# Patient Record
Sex: MALE | Race: WHITE | NOT HISPANIC OR LATINO | Employment: FULL TIME | URBAN - METROPOLITAN AREA
[De-identification: names, ages, dates, MRNs, and addresses within clinical notes are randomized per-mention and may not be internally consistent; named-entity substitution may affect disease eponyms.]

---

## 2017-04-26 ENCOUNTER — TELEPHONE (OUTPATIENT)
Dept: FAMILY MEDICINE | Facility: OTHER | Age: 52
End: 2017-04-26

## 2017-04-26 NOTE — TELEPHONE ENCOUNTER
Hard Copy Prescription for ADDERALL XR 15 MG printed on 07/13/2016 NEVER PICKED UP, Hard Copy shredded    Closing encounter  Radha Morales RT (R)

## 2017-06-14 ENCOUNTER — OFFICE VISIT (OUTPATIENT)
Dept: FAMILY MEDICINE | Facility: OTHER | Age: 52
End: 2017-06-14
Payer: COMMERCIAL

## 2017-06-14 VITALS
DIASTOLIC BLOOD PRESSURE: 82 MMHG | OXYGEN SATURATION: 96 % | HEIGHT: 72 IN | HEART RATE: 64 BPM | WEIGHT: 198.8 LBS | TEMPERATURE: 98.2 F | RESPIRATION RATE: 14 BRPM | SYSTOLIC BLOOD PRESSURE: 122 MMHG | BODY MASS INDEX: 26.93 KG/M2

## 2017-06-14 DIAGNOSIS — K12.0 CANKER SORES ORAL: ICD-10-CM

## 2017-06-14 DIAGNOSIS — R07.0 THROAT PAIN: Primary | ICD-10-CM

## 2017-06-14 PROCEDURE — 99214 OFFICE O/P EST MOD 30 MIN: CPT | Performed by: FAMILY MEDICINE

## 2017-06-14 ASSESSMENT — PAIN SCALES - GENERAL: PAINLEVEL: SEVERE PAIN (7)

## 2017-06-14 NOTE — NURSING NOTE
"Chief Complaint   Patient presents with     Pharyngitis     Panel Management       Initial /82  Pulse 64  Temp 98.2  F (36.8  C) (Temporal)  Resp 14  Ht 5' 11.5\" (1.816 m)  Wt 198 lb 12.8 oz (90.2 kg)  SpO2 96%  BMI 27.34 kg/m2 Estimated body mass index is 27.34 kg/(m^2) as calculated from the following:    Height as of this encounter: 5' 11.5\" (1.816 m).    Weight as of this encounter: 198 lb 12.8 oz (90.2 kg).  Medication Reconciliation: complete     Liz Mathews MA    "

## 2017-06-14 NOTE — PROGRESS NOTES
SUBJECTIVE:                                                    Rebel Garcia is a 51 year old male who presents to clinic today for the following health issues:      Acute Illness   Acute illness concerns: sore throat  Onset: 3  weeks ago     Fever: no     Chills/Sweats: no     Headache (location?): no     Sinus Pressure:no    Conjunctivitis:  no    Ear Pain: no    Rhinorrhea: no     Congestion: no     Sore Throat: YES     Cough: no    Wheeze: no     Decreased Appetite: no     Nausea: no     Vomiting: no     Diarrhea:  no     Dysuria/Freq.: no     Fatigue/Achiness: no     Sick/Strep Exposure: no      Therapies Tried and outcome: Patient states he takes Tylenol and Ibuprofen it does seem to help with pain and discomfort.   He was previously seen at an Urgent care , had a negative strept test and culture. No fevers , patient does not smoke , does not chew tobacco           Problem list and histories reviewed & adjusted, as indicated.  Additional history: as documented    Patient Active Problem List   Diagnosis     Pain in joint, lower leg     Encounter for other general counseling or advice on contraception     Encounter for sterilization     ESOPHAGEAL REFLUX     Anxiety     CARDIOVASCULAR SCREENING; LDL GOAL LESS THAN 160     Family history of colon cancer     Family history of prostate cancer     Attention deficit hyperactivity disorder (ADHD)     Past Surgical History:   Procedure Laterality Date     COLONOSCOPY  2/25/2000     COLONOSCOPY  02/16/10     COLONOSCOPY N/A 7/17/2015    Procedure: COMBINED COLONOSCOPY, SINGLE OR MULTIPLE BIOPSY/POLYPECTOMY BY BIOPSY;  Surgeon: Dima Sosa MD;  Location:  GI     HC CORRECT KIM BORREGO/DARNELL/JILLIAN  05/04/2005    Modified Darnell bunionectomy, right foot.     HC REPAIR UMBILICAL HARDY,5+Y/O, INCARCERATED OR STRANGULATED  6/15/2004     HERNIA REPAIR, INCISIONAL  08/25/2006    Incarcerated recurrent ventral hernia.     HERNIORRHAPHY UMBILICAL  3/29/2011  "   HERNIORRHAPHY UMBILICAL performed by ALOK WESLEY at  OR       Social History   Substance Use Topics     Smoking status: Former Smoker     Packs/day: 0.75     Years: 18.00     Types: Cigarettes     Quit date: 2006     Smokeless tobacco: Never Used     Alcohol use Yes      Comment: occ     Family History   Problem Relation Age of Onset     Allergies Father      iodine     HEART DISEASE Father      heart attack at 59 - CABG at 60     CEREBROVASCULAR DISEASE Father      CANCER Mother      colon -  at age 56     CANCER Sister      cervix -  at age 38     Neurologic Disorder Sister      CNS bleed x 2 sisters     Prostate Cancer Brother 58     Other Cancer Brother 62     stage 4 liver cancer     Colon Cancer Sister 68         Current Outpatient Prescriptions   Medication Sig Dispense Refill     MAGIC MOUTHWASH, ENTER INGREDIENTS IN COMMENTS, Swish and spit 5-10 mLs in mouth every 6 hours as needed 180 mL 1     amphetamine-dextroamphetamine (ADDERALL XR) 15 MG per capsule Take 1 capsule (15 mg) by mouth daily (Patient not taking: Reported on 2017) 30 capsule 0     Allergies   Allergen Reactions     No Known Allergies      BP Readings from Last 3 Encounters:   17 122/82   16 122/70   12/16/15 124/76    Wt Readings from Last 3 Encounters:   17 198 lb 12.8 oz (90.2 kg)   16 195 lb (88.5 kg)   12/16/15 196 lb (88.9 kg)                  Labs reviewed in EPIC    Reviewed and updated as needed this visit by clinical staff       Reviewed and updated as needed this visit by Provider         ROS:  C: NEGATIVE for fever, chills, change in weight  ENT/MOUTH: POSITIVE for sore throat on the right side of the throat   R: NEGATIVE for significant cough or SOB  CV: NEGATIVE for chest pain, palpitations or peripheral edema    OBJECTIVE:                                                    /82  Pulse 64  Temp 98.2  F (36.8  C) (Temporal)  Resp 14  Ht 5' 11.5\" (1.816 m)  Wt " 198 lb 12.8 oz (90.2 kg)  SpO2 96%  BMI 27.34 kg/m2  Body mass index is 27.34 kg/(m^2).   GENERAL:  alert, well nourished, well hydrated, no distress  HENT: ear canals- normal; TMs- normal; Nose- normal; Mouth- ulcer noted on the tonsil on the right, with yellow base  with mild erythema, no exudate.   NECK: no tenderness, no adenopathy, no asymmetry, no masses, no stiffness; thyroid- normal to palpation      Diagnostic test results:  Diagnostic Test Results:  none      ASSESSMENT/PLAN:                                                    1. Throat pain  Discussed with patient  Canker sore noted , unclear cause , will treat with Magic mouth  wash , but if not  Improved , I will like him to see ENT , will need biopsy of lesion . Referral placed , patient will call to schedule  - OTOLARYNGOLOGY REFERRAL  - MAGIC MOUTHWASH, ENTER INGREDIENTS IN COMMENTS,; Swish and spit 5-10 mLs in mouth every 6 hours as needed  Dispense: 180 mL; Refill: 1    2. Canker sores oral  As above   - OTOLARYNGOLOGY REFERRAL  - MAGIC MOUTHWASH, ENTER INGREDIENTS IN COMMENTS,; Swish and spit 5-10 mLs in mouth every 6 hours as needed  Dispense: 180 mL; Refill: 1      Follow up with Provider -      Nena Johnson MD, MD  [facility]

## 2017-06-14 NOTE — MR AVS SNAPSHOT
After Visit Summary   6/14/2017    Rebel Garcia    MRN: [id number]           Patient Information     Date Of Birth          1965        Visit Information        Provider Department      6/14/2017 3:20 PM Nena Johnson MD [facility]        Today's Diagnoses     Throat pain    -  1    Canker sores oral           Follow-ups after your visit        Additional Services     OTOLARYNGOLOGY REFERRAL       Your provider has referred you to: [facility]: [facility] [phone]   [email or web address]    Please be aware that coverage of these services is subject to the terms and limitations of your health insurance plan.  Call member services at your health plan with any benefit or coverage questions.      Please bring the following with you to your appointment:    (1) Any X-Rays, CTs or MRIs which have been performed.  Contact the facility where they were done to arrange for  prior to your scheduled appointment.   (2) List of current medications  (3) This referral request   (4) Any documents/labs given to you for this referral                  Who to contact     If you have questions or need follow up information about today's clinic visit or your schedule please contact [facility] directly at [phone].  Normal or non-critical lab and imaging results will be communicated to you by MyChart, letter or phone within 4 business days after the clinic has received the results. If you do not hear from us within 7 days, please contact the clinic through MyChart or phone. If you have a critical or abnormal lab result, we will notify you by phone as soon as possible.  Submit refill requests through [facility] [employer] or call your pharmacy and they will forward the refill request to us. Please allow 3 business days for your refill to be completed.          Additional Information About Your Visit        MyChart Information      "[employer] lets you send messages to your doctor, view your test results, renew your prescriptions, schedule appointments and more. To sign up, go to [email or web address] . Click on \"Log in\" on the left side of the screen, which will take you to the Welcome page. Then click on \"Sign up Now\" on the right side of the page.     You will be asked to enter the access code listed below, as well as some personal information. Please follow the directions to create your username and password.     Your access code is: [email or web address]  Expires: 2017  3:45 PM     Your access code will  in 90 days. If you need help or a new code, please call your [place] clinic or [phone].        Care EveryWhere ID     This is your Care EveryWhere ID. This could be used by other organizations to access your [place] medical records  [id number]        Your Vitals Were     Pulse Temperature Respirations Height Pulse Oximetry BMI (Body Mass Index)    64 98.2  F (36.8  C) (Temporal) 14 5' 11.5\" (1.816 m) 96% 27.34 kg/m2       Blood Pressure from Last 3 Encounters:   17 122/82   16 122/70   12/16/15 124/76    Weight from Last 3 Encounters:   17 198 lb 12.8 oz (90.2 kg)   16 195 lb (88.5 kg)   12/16/15 196 lb (88.9 kg)              We Performed the Following     OTOLARYNGOLOGY REFERRAL          Today's Medication Changes          These changes are accurate as of: 17  3:45 PM.  If you have any questions, ask your nurse or doctor.               Start taking these medicines.        Dose/Directions    MAGIC MOUTHWASH (ENTER INGREDIENTS IN COMMENTS)   Used for:  Canker sores oral, Throat pain   Started by:  Nena Johnson MD        Dose:  5-10 mL   Swish and spit 5-10 mLs in mouth every 6 hours as needed   Quantity:  180 mL   Refills:  1            Where to get your medicines      Some of these will need a paper prescription and others can be bought over the counter.  Ask your nurse if you have " questions.     Bring a paper prescription for each of these medications     MAGIC MOUTHWASH (ENTER INGREDIENTS IN COMMENTS)                Primary Care Provider Office Phone # Fax #    Marilu Gomez PA-C [phone]       [facility] [address] [place] [address]        Thank you!     Thank you for choosing [facility]  for your care. Our goal is always to provide you with excellent care. Hearing back from our patients is one way we can continue to improve our services. Please take a few minutes to complete the written survey that you may receive in the mail after your visit with us. Thank you!             Your Updated Medication List - Protect others around you: Learn how to safely use, store and throw away your medicines at www.disposemymeds.org.          This list is accurate as of: 6/14/17  3:45 PM.  Always use your most recent med list.                   Brand Name Dispense Instructions for use    amphetamine-dextroamphetamine 15 MG per 24 hr capsule    ADDERALL XR    30 capsule    Take 1 capsule (15 mg) by mouth daily       MAGIC MOUTHWASH (ENTER INGREDIENTS IN COMMENTS)     180 mL    Swish and spit 5-10 mLs in mouth every 6 hours as needed

## 2017-11-13 ENCOUNTER — TELEPHONE (OUTPATIENT)
Dept: FAMILY MEDICINE | Facility: OTHER | Age: 52
End: 2017-11-13

## 2017-11-13 ENCOUNTER — OFFICE VISIT (OUTPATIENT)
Dept: FAMILY MEDICINE | Facility: OTHER | Age: 52
End: 2017-11-13
Payer: COMMERCIAL

## 2017-11-13 ENCOUNTER — RADIANT APPOINTMENT (OUTPATIENT)
Dept: GENERAL RADIOLOGY | Facility: OTHER | Age: 52
End: 2017-11-13
Attending: PHYSICIAN ASSISTANT
Payer: COMMERCIAL

## 2017-11-13 VITALS
RESPIRATION RATE: 16 BRPM | WEIGHT: 199 LBS | SYSTOLIC BLOOD PRESSURE: 118 MMHG | BODY MASS INDEX: 27.37 KG/M2 | OXYGEN SATURATION: 97 % | TEMPERATURE: 97.6 F | HEART RATE: 78 BPM | DIASTOLIC BLOOD PRESSURE: 76 MMHG

## 2017-11-13 DIAGNOSIS — F90.9 ATTENTION DEFICIT HYPERACTIVITY DISORDER (ADHD), UNSPECIFIED ADHD TYPE: ICD-10-CM

## 2017-11-13 DIAGNOSIS — R04.2 HEMOPTYSIS: Primary | ICD-10-CM

## 2017-11-13 DIAGNOSIS — Z87.891 FORMER SMOKER: ICD-10-CM

## 2017-11-13 DIAGNOSIS — R07.0 THROAT PAIN: ICD-10-CM

## 2017-11-13 DIAGNOSIS — R04.2 HEMOPTYSIS: ICD-10-CM

## 2017-11-13 LAB
ERYTHROCYTE [DISTWIDTH] IN BLOOD BY AUTOMATED COUNT: 13.1 % (ref 10–15)
HCT VFR BLD AUTO: 45.7 % (ref 40–53)
HGB BLD-MCNC: 15.9 G/DL (ref 13.3–17.7)
MCH RBC QN AUTO: 30.3 PG (ref 26.5–33)
MCHC RBC AUTO-ENTMCNC: 34.8 G/DL (ref 31.5–36.5)
MCV RBC AUTO: 87 FL (ref 78–100)
PLATELET # BLD AUTO: 221 10E9/L (ref 150–450)
RBC # BLD AUTO: 5.25 10E12/L (ref 4.4–5.9)
WBC # BLD AUTO: 8.3 10E9/L (ref 4–11)

## 2017-11-13 PROCEDURE — 36415 COLL VENOUS BLD VENIPUNCTURE: CPT | Performed by: PHYSICIAN ASSISTANT

## 2017-11-13 PROCEDURE — 85027 COMPLETE CBC AUTOMATED: CPT | Performed by: PHYSICIAN ASSISTANT

## 2017-11-13 PROCEDURE — 86480 TB TEST CELL IMMUN MEASURE: CPT | Performed by: PHYSICIAN ASSISTANT

## 2017-11-13 PROCEDURE — 99214 OFFICE O/P EST MOD 30 MIN: CPT | Performed by: PHYSICIAN ASSISTANT

## 2017-11-13 PROCEDURE — 71020 XR CHEST 2 VW: CPT

## 2017-11-13 RX ORDER — DEXTROAMPHETAMINE SACCHARATE, AMPHETAMINE ASPARTATE MONOHYDRATE, DEXTROAMPHETAMINE SULFATE AND AMPHETAMINE SULFATE 3.75; 3.75; 3.75; 3.75 MG/1; MG/1; MG/1; MG/1
15 CAPSULE, EXTENDED RELEASE ORAL DAILY
Qty: 30 CAPSULE | Refills: 0 | Status: SHIPPED | OUTPATIENT
Start: 2017-11-17 | End: 2017-11-13

## 2017-11-13 RX ORDER — DEXTROAMPHETAMINE SACCHARATE, AMPHETAMINE ASPARTATE MONOHYDRATE, DEXTROAMPHETAMINE SULFATE AND AMPHETAMINE SULFATE 3.75; 3.75; 3.75; 3.75 MG/1; MG/1; MG/1; MG/1
15 CAPSULE, EXTENDED RELEASE ORAL DAILY
Qty: 30 CAPSULE | Refills: 0 | Status: SHIPPED | OUTPATIENT
Start: 2017-12-17 | End: 2018-07-30

## 2017-11-13 ASSESSMENT — PAIN SCALES - GENERAL: PAINLEVEL: MILD PAIN (3)

## 2017-11-13 NOTE — PROGRESS NOTES
"  SUBJECTIVE:   Rebel Garcia is a 52 year old male who presents to clinic today for the following health issues:       Pt is masked  Acute Illness--   Acute illness concerns: uri  Onset: a few days ago, started with some discomfort at the base of his throat, he feels the need to clear his throat often.      Fever: no    Chills/Sweats: no, specifically no night sweats     Headache (location?): no    Sinus Pressure:no    Conjunctivitis:  no    Ear Pain: no    Rhinorrhea: no    Congestion: no    Sore Throat: no but muscles are sore there at base of his throat as above this occurred first     Cough: YES-productive of blood colored sputum yesterday coughed up some flecks of blood in mucus, then next time he coughed up about 1/2 cup of bright red blood, last evening he coughed up 1/4 cup of bright red blood, and this morning a mix of brown \"old \"blood and bright red blood\" it was less today than it had been.  He feels well. Denies travel outside of country or exposure to people with TB . He denies recent epistaxis     Wheeze: no    Decreased Appetite: no    Nausea: no    Vomiting: no    Diarrhea:  no    Dysuria/Freq.: no    Fatigue/Achiness: no    Sick/Strep Exposure: no     Therapies Tried and outcome: ibuprofen, tylenol         Problem list and histories reviewed & adjusted, as indicated.  Additional history: as documented    BP Readings from Last 3 Encounters:   11/13/17 118/76   06/14/17 122/82   08/17/16 122/70    Wt Readings from Last 3 Encounters:   11/13/17 199 lb (90.3 kg)   06/14/17 198 lb 12.8 oz (90.2 kg)   08/17/16 195 lb (88.5 kg)                  Labs reviewed in EPIC      Reviewed and updated as needed this visit by clinical staff     Reviewed and updated as needed this visit by Provider         ROS:  As above denies CP, palpitations, ab pains, bowel changes, does have some appetite suppression with adderall     OBJECTIVE:     /76  Pulse 78  Temp 97.6  F (36.4  C) (Temporal)  Resp 16  Wt 199 " lb (90.3 kg)  SpO2 97%  BMI 27.37 kg/m2  Body mass index is 27.37 kg/(m^2).  GENERAL: healthy, alert and no distress  HENT: ear canals and TM's normal, nose and mouth without ulcers or lesions  NECK: no adenopathy, no asymmetry, masses, or scars and thyroid normal to palpation  RESP: lungs clear to auscultation - no rales, rhonchi or wheezes  CV: regular rate and rhythm, normal S1 S2, no S3 or S4, no murmur, click or rub, no peripheral edema and peripheral pulses strong  ABDOMEN: soft, nontender, no hepatosplenomegaly, no masses and bowel sounds normal  MS: no gross musculoskeletal defects noted, no edema  PSYCH: mentation appears normal, affect normal/bright    Diagnostic Test Results:  Results for orders placed or performed in visit on 11/13/17 (from the past 24 hour(s))   CBC with platelets   Result Value Ref Range    WBC 8.3 4.0 - 11.0 10e9/L    RBC Count 5.25 4.4 - 5.9 10e12/L    Hemoglobin 15.9 13.3 - 17.7 g/dL    Hematocrit 45.7 40.0 - 53.0 %    MCV 87 78 - 100 fl    MCH 30.3 26.5 - 33.0 pg    MCHC 34.8 31.5 - 36.5 g/dL    RDW 13.1 10.0 - 15.0 %    Platelet Count 221 150 - 450 10e9/L     CXR - called to speak to Dr. Campbell regarding cxr, neg for lesions or obvious causes, requested ct scan    ASSESSMENT/PLAN:     Spoke to Dr. Amin regarding this patient, and management of    1. Hemoptysis  No clear reason, pt would like to see ENT to ensure blood is not coming from his throat since it is sore at the base of his throat, this is reasonable, CT for tomorrow to ER if worsening hemoptysis, if no reason can be found for hemoptysis will also do Pulmonology    - XR Chest 2 Views; Future  - CBC with platelets  - M Tuberculosis by [facility]  - CT Chest w Contrast; Future  - PULMONARY MEDICINE REFERRAL  - OTOLARYNGOLOGY REFERRAL    2. Former smoker    - CT Chest w Contrast; Future  - PULMONARY MEDICINE REFERRAL  - OTOLARYNGOLOGY REFERRAL    3. Attention deficit hyperactivity disorder (ADHD), unspecified ADHD  type  Pt doing well without issues  2 month refilled   - amphetamine-dextroamphetamine (ADDERALL XR) 15 MG per 24 hr capsule; Take 1 capsule (15 mg) by mouth daily  Dispense: 30 capsule; Refill: 0    4. Throat pain    - OTOLARYNGOLOGY REFERRAL        Marilu Gomez PA-C  [facility]  Electronically signed by Marilu Gomez PA-C

## 2017-11-13 NOTE — LETTER
November 15, 2017      Rebel Garcia  [address]  [place] [address]      Dear ,    We are writing to inform you of your test results.  Your test results fall within the expected range(s) or remain unchanged from previous results.  Please continue with current treatment plan.    Resulted Orders   CBC with platelets   Result Value Ref Range    WBC 8.3 4.0 - 11.0 10e9/L    RBC Count 5.25 4.4 - 5.9 10e12/L    Hemoglobin 15.9 13.3 - 17.7 g/dL    Hematocrit 45.7 40.0 - 53.0 %    MCV 87 78 - 100 fl    MCH 30.3 26.5 - 33.0 pg    MCHC 34.8 31.5 - 36.5 g/dL    RDW 13.1 10.0 - 15.0 %    Platelet Count 221 150 - 450 10e9/L   M Tuberculosis by [facility]   Result Value Ref Range    M Tuberculosis Result Negative NEG^Negative    M Tuberculosis Antigen Value 0.01 IU/mL      Comment:      This is a qualitative test.  The TB antigen IU/mL value is required for   documentation on certain government reporting forms but this value should not   be used to monitor disease progression or response to therapy.  Diagnosing or excluding tuberculosis disease, and assessing the probability of   LTBI, require a combination of epidemiological, historical, medical and   diagnostic findings that should be taken into account when interpreting   [facility] TB results.     If you have any questions or concerns, please call the clinic at the number listed above.     Sincerely,    Marilu Gomez PA-C

## 2017-11-13 NOTE — NURSING NOTE
"Chief Complaint   Patient presents with     URI       Initial /76  Pulse 78  Temp 97.6  F (36.4  C) (Temporal)  Resp 16  Wt 199 lb (90.3 kg)  SpO2 97%  BMI 27.37 kg/m2 Estimated body mass index is 27.37 kg/(m^2) as calculated from the following:    Height as of 6/14/17: 5' 11.5\" (1.816 m).    Weight as of this encounter: 199 lb (90.3 kg).  Medication Reconciliation: complete     Gabby Najera CMA (AAMA)    "

## 2017-11-13 NOTE — MR AVS SNAPSHOT
After Visit Summary   11/13/2017    Rebel Garcia    MRN: [id number]           Patient Information     Date Of Birth          1965        Visit Information        Provider Department      11/13/2017 2:00 PM Marilu Gomez PA-C [facility]'s Diagnoses     Hemoptysis    -  1    Former smoker        Attention deficit hyperactivity disorder (ADHD), unspecified ADHD type        Throat pain           Follow-ups after your visit        Additional Services     OTOLARYNGOLOGY REFERRAL       Your provider has referred you to: [facility]: [facility] [phone]   [email or web address]    Please be aware that coverage of these services is subject to the terms and limitations of your health insurance plan.  Call member services at your health plan with any benefit or coverage questions.      Please bring the following with you to your appointment:    (1) Any X-Rays, CTs or MRIs which have been performed.  Contact the facility where they were done to arrange for  prior to your scheduled appointment.   (2) List of current medications  (3) This referral request   (4) Any documents/labs given to you for this referral            PULMONARY MEDICINE REFERRAL       Your provider has referred you to: [facility]: [facility] [phone]   [email or web address]  [facility]: [facility] (Adults & Pediatrics) [facility] [phone]   [email or web address]/  [facility]: [facility] [phone]   [email or web address]    Please be aware that coverage of these services is subject to the terms and limitations of your health insurance plan.  Call member services at your health plan with any benefit or coverage questions.      Please bring the following with you to your appointment:    (1)  Any X-Rays, CTs or MRIs which have been performed.  Contact the facility where they were done to arrange for  prior to your scheduled appointment.    (2) List of current medications   (3) This referral request   (4) Any documents/labs given to you for this referral                  Your next 10 appointments already scheduled     Nov 14, 2017  8:30 AM CST   CT CHEST W CONTRAST with PHCT1   [facility] CT Scan ([facility])    [address] [place] [id number] [phone]           Please bring any scans or X-rays taken at other hospitals, if similar tests were done. Also bring a list of your medicines, including vitamins, minerals and over-the-counter drugs. It is safest to leave personal items at home.  Be sure to tell your doctor:   If you have any allergies.   If there s any chance you are pregnant.   If you are breastfeeding.   If you have any special needs.  You will have contrast for this exam. To prepare:   Do not eat or drink for 2 hours before your exam. If you need to take medicine, you may take it with small sips of water. (We may ask you to take liquid medicine as well.)   The day before your exam, drink extra fluids at least six 8-ounce glasses (unless your doctor tells you to restrict your fluids).  Patients over 70 or patients with diabetes or kidney problems:   If you haven t had a blood test (creatinine test) within the last 30 days, go to your clinic or Diagnostic Imaging Department for this test.  If you have diabetes:   If your kidney function is normal, continue taking your metformin (Avandamet, Glucophage, Glucovance, Metaglip) on the day of your exam.   If your kidney function is abnormal, wait 48 hours before restarting this medicine.  Please wear loose clothing, such as a sweat suit or jogging clothes. Avoid snaps, zippers and other metal. We may ask you to undress and put on a hospital gown.  If you have any questions, please call the Imaging  "Department where you will have your exam.              Future tests that were ordered for you today     Open Future Orders        Priority Expected Expires Ordered    CT Chest w Contrast Routine  2018            Who to contact     If you have questions or need follow up information about today's clinic visit or your schedule please contact [facility] [place] directly at [phone].  Normal or non-critical lab and imaging results will be communicated to you by MyChart, letter or phone within 4 business days after the clinic has received the results. If you do not hear from us within 7 days, please contact the clinic through [employer]hart or phone. If you have a critical or abnormal lab result, we will notify you by phone as soon as possible.  Submit refill requests through [employer] or call your pharmacy and they will forward the refill request to us. Please allow 3 business days for your refill to be completed.          Additional Information About Your Visit        [employer] Information     [employer] lets you send messages to your doctor, view your test results, renew your prescriptions, schedule appointments and more. To sign up, go to [email or web address] . Click on \"Log in\" on the left side of the screen, which will take you to the Welcome page. Then click on \"Sign up Now\" on the right side of the page.     You will be asked to enter the access code listed below, as well as some personal information. Please follow the directions to create your username and password.     Your access code is: [email or web address]  Expires: 2018  2:38 PM     Your access code will  in 90 days. If you need help or a new code, please call your [facility] clinic or [phone].        Care EveryWhere ID     This is your Care EveryWhere ID. This could be used by other organizations to access your [facility] medical records  [id number]        Your Vitals Were     Pulse Temperature Respirations Pulse Oximetry BMI (Body " Mass Index)       78 97.6  F (36.4  C) (Temporal) 16 97% 27.37 kg/m2        Blood Pressure from Last 3 Encounters:   11/13/17 118/76   06/14/17 122/82   08/17/16 122/70    Weight from Last 3 Encounters:   11/13/17 199 lb (90.3 kg)   06/14/17 198 lb 12.8 oz (90.2 kg)   08/17/16 195 lb (88.5 kg)              We Performed the Following     CBC with platelets     M Tuberculosis by Quantiferon     OTOLARYNGOLOGY REFERRAL     PULMONARY MEDICINE REFERRAL          Today's Medication Changes          These changes are accurate as of: 11/13/17  2:44 PM.  If you have any questions, ask your nurse or doctor.               Start taking these medicines.        Dose/Directions    amphetamine-dextroamphetamine 15 MG per 24 hr capsule   Commonly known as:  ADDERALL XR   Used for:  Attention deficit hyperactivity disorder (ADHD), unspecified ADHD type   Started by:  Marilu Gomez PA-C        Dose:  15 mg   Start taking on:  12/17/2017   Take 1 capsule (15 mg) by mouth daily   Quantity:  30 capsule   Refills:  0            Where to get your medicines      Some of these will need a paper prescription and others can be bought over the counter.  Ask your nurse if you have questions.     Bring a paper prescription for each of these medications     amphetamine-dextroamphetamine 15 MG per 24 hr capsule                Primary Care Provider Office Phone # Fax #    Marilu Gomez PA-C [phone] [address] [place] [address]        Equal Access to Services     ANDREINA CANTU AH: Hadii guillermo geigero Soomaali, waaxda luqadaha, qaybta kaalmada adeegyada, waxay idiin hayaan adeelmer davies. So [facility] [phone].    ATENCIÓN: Si habla español, tiene a pan disposición servicios gratuitos de asistencia lingüística. Llame al [phone].    We comply with applicable federal civil rights laws and [place] laws. We do not discriminate on the basis of race, color, national origin, age, disability, sex, sexual  orientation, or gender identity.            Thank you!     Thank you for choosing [facility]  for your care. Our goal is always to provide you with excellent care. Hearing back from our patients is one way we can continue to improve our services. Please take a few minutes to complete the written survey that you may receive in the mail after your visit with us. Thank you!             Your Updated Medication List - Protect others around you: Learn how to safely use, store and throw away your medicines at www.disposemymeds.org.          This list is accurate as of: 11/13/17  2:44 PM.  Always use your most recent med list.                   Brand Name Dispense Instructions for use Diagnosis    amphetamine-dextroamphetamine 15 MG per 24 hr capsule   Start taking on:  12/17/2017    ADDERALL XR    30 capsule    Take 1 capsule (15 mg) by mouth daily    Attention deficit hyperactivity disorder (ADHD), unspecified ADHD type

## 2017-11-13 NOTE — TELEPHONE ENCOUNTER
Reason for Call:  Same Day Appointment, Requested Provider:  WILLIAM Walker    PCP: Marilu Gomez    Reason for visit: coughing/ sometimes he coughs up a bit of blood, wants to see NP today if he can     Duration of symptoms: ongoing    Have you been treated for this in the past? No    Additional comments: none    Can we leave a detailed message on this number? YES    Phone number patient can be reached at: Cell number on file:    Telephone Information:   Mobile [phone]       Best Time: any    Call taken on 11/13/2017 at 11:05 AM by Genna Recinos

## 2017-11-14 ENCOUNTER — TELEPHONE (OUTPATIENT)
Dept: FAMILY MEDICINE | Facility: OTHER | Age: 52
End: 2017-11-14

## 2017-11-14 ENCOUNTER — HOSPITAL ENCOUNTER (OUTPATIENT)
Dept: CT IMAGING | Facility: CLINIC | Age: 52
Discharge: HOME | End: 2017-11-14
Attending: PHYSICIAN ASSISTANT | Admitting: PHYSICIAN ASSISTANT
Payer: COMMERCIAL

## 2017-11-14 DIAGNOSIS — R04.2 HEMOPTYSIS: ICD-10-CM

## 2017-11-14 DIAGNOSIS — Z87.891 FORMER SMOKER: ICD-10-CM

## 2017-11-14 PROCEDURE — 25000125 ZZHC RX 250: Performed by: PHYSICIAN ASSISTANT

## 2017-11-14 PROCEDURE — 71260 CT THORAX DX C+: CPT

## 2017-11-14 PROCEDURE — 25000128 H RX IP 250 OP 636: Performed by: PHYSICIAN ASSISTANT

## 2017-11-14 RX ORDER — IOPAMIDOL 755 MG/ML
100 INJECTION, SOLUTION INTRAVASCULAR ONCE
Status: COMPLETED | OUTPATIENT
Start: 2017-11-14 | End: 2017-11-14

## 2017-11-14 RX ADMIN — IOPAMIDOL 75 ML: 755 INJECTION, SOLUTION INTRAVENOUS at 08:41

## 2017-11-14 RX ADMIN — SODIUM CHLORIDE 75 ML: 9 INJECTION, SOLUTION INTRAVENOUS at 08:40

## 2017-11-14 NOTE — TELEPHONE ENCOUNTER
Spoke to pt- ct scan of lungs nl except mild scarring RMl which is not reason for hemoptysis  He has ENT set up for 2 days from now.  Much less blood this morning  Marilu Gomez PA-C

## 2017-11-15 LAB
M TB TUBERC IFN-G BLD QL: NEGATIVE
M TB TUBERC IFN-G/MITOGEN IGNF BLD: 0.01 IU/ML

## 2017-11-15 NOTE — PROGRESS NOTES
ENT Consultation    Rebel Garcia is a 52 year old male who is seen in consultation at the request of Marilu Narayan.      History of Present Illness - Rebel Garcia is a 52 year old male who presents with a sense of something in the throat since Saturday 11/11/17, sx started with a cough. He was seen by PCP on 11/13/17 when he started coughing up blood, a chest x-ray was done at that time. The sensation is constant. It is worse laying down. Salt water and Tylenol seems to make it better. The patient denies heartburn or regurgitation, and has not completed at least a 6 week trial of proton pump inhibitors twice daily. The patient confirms associated hoarseness. Hemoptysis only lasted for 2 days in total about 8 oz blood. No fevers or chills there was some sore throat sammy yesterday now feeling much better. He quit smoking 20 years ago.      Past Medical History -   Past Medical History:   Diagnosis Date     Migraine, unspecified, without mention of intractable migraine without mention of status migrainosus      Sleep apnea        Current Medications -   Current Outpatient Prescriptions:      [START ON 12/17/2017] amphetamine-dextroamphetamine (ADDERALL XR) 15 MG per 24 hr capsule, Take 1 capsule (15 mg) by mouth daily, Disp: 30 capsule, Rfl: 0    Allergies -   Allergies   Allergen Reactions     No Known Allergies        Social History -   Social History     Social History     Marital status:      Spouse name: N/A     Number of children: 2     Years of education: N/A     Occupational History     [employer]     Social History Main Topics     Smoking status: Former Smoker     Packs/day: 0.75     Years: 18.00     Types: Cigarettes     Quit date: 2/14/2006     Smokeless tobacco: Never Used     Alcohol use Yes      Comment: occ     Drug use: No     Sexual activity: Yes     Partners: Female     Birth control/ protection: Pill      Comment: S.O. 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 No     Social History Narrative       Family History -   Family History   Problem Relation Age of Onset     Allergies Father      iodine     HEART DISEASE Father      heart attack at 59 - CABG at 60     CEREBROVASCULAR DISEASE Father      CANCER Mother      colon -  at age 56     CANCER Sister      cervix -  at age 38     Neurologic Disorder Sister      CNS bleed x 2 sisters     Prostate Cancer Brother 58     Other Cancer Brother 62     stage 4 liver cancer     Colon Cancer Sister 68        Review of Systems -  General: negative for, fever, chills, night sweats, intentional weight loss, unplanned weight loss, headaches, dizziness, fatigue, weakness  ENT: positive for sore throat    Physical Exam  There were no vitals taken for this visit.    General - The patient is well nourished and well developed, and appears to have good nutritional status.  Alert and oriented to person and place, answers questions and cooperates with examination appropriately.     SKIN - No suspicious lesions or rashes.  Respiration - No respiratory distress.    Head and Face - Normocephalic and atraumatic, with no gross asymmetry noted of the contour of the facial features.  The facial nerve is intact, with strong symmetric movements.    Voice and Breathing - The patient was breathing comfortably without the use of accessory muscles. There was no wheezing, stridor, or stertor.  The patients voice was normal.    Ears - Bilateral pinna and EACs with normal appearing overlying skin. Tympanic membrane  intact with good mobility on pneumatic otoscopy bilaterally. Bony landmarks of the ossicular chain are normal. The tympanic membranes are normal in appearance. No retraction, perforation, or masses.  No fluid or purulence was seen in the external canal or the middle ear.     Eyes - Extraocular movements intact.  Sclera were not icteric or injected, conjunctiva were pink and moist.    Mouth - Examination of the oral cavity showed pink, healthy oral mucosa. No lesions or ulcerations noted.  The tongue was mobile and midline, and the dentition were in good condition.      Throat - The walls of the oropharynx were smooth, pink, moist, symmetric, and had no lesions or ulcerations.  The tonsillar pillars and soft palate were symmetric.  The uvula was midline on elevation. Tonsils 2+ without exudates or asymmetry.    Neck - Normal midline excursion of the laryngotracheal complex during swallowing.  Full range of motion on passive movement.  Palpation of the occipital, submental, submandibular, internal jugular chain, and supraclavicular nodes did not demonstrate any abnormal lymph nodes or masses.  The carotid pulse was palpable bilaterally.  Palpation of the thyroid was soft and smooth, with no nodules or goiter appreciated.  The trachea was mobile and midline.    Nose - External contour is symmetric, no gross deflection or scars.  Nasal mucosa is pink and moist with no abnormal mucus.  The septum was midline and non-obstructive, turbinates of normal size and position.  No polyps, masses, or purulence noted on examination.    Procedure: Flexible Endoscopy  Indication: globus sensation    Performed in clinic today:  Attempts at mirror laryngoscopy were not possible due to gag reflex.  Therefore I proceeded with a fiberoptic examination.  First I sprayed both sides of the nose with a mixture of lidocaine and neosynephrine.  I then passed the scope through the nasal cavity.  The nasal cavity was unremarkable.  The  nasopharynx was mucosally covered and symmetric.  The Eustachian tube openings were unobstructed.  Going further down I had a clear view of the base of tongue which had normal appearing lingual tonsillar tissue.  The base of tongue was free of lesions, and the vallecula was open.  The epiglottis was smooth and mucosally covered.  The supraglottic larynx was then clearly visualized.  The vocal cords moved smoothly and symmetrically, they were pearly white and no lesions were seen.  The pyriform sinuses were open, and the limited view of the postcricoid region did not show any lesions. Lingual tonsil 2+ but without supicious lesions or asymmetry. Dark Blue - IQ0039 Optim ENTity      A/P - Rebel Garcia is a 52 year old male with globus sensation that is better and 2 episodes of hemoptysis. He could have had URI and coughing hard may have opened small blood vessel. Considering that the symptoms are much improved will observe conservatively.      Rebel will follow up prn.      Jude Vega MD

## 2017-11-16 ENCOUNTER — OFFICE VISIT (OUTPATIENT)
Dept: OTOLARYNGOLOGY | Facility: CLINIC | Age: 52
End: 2017-11-16
Payer: COMMERCIAL

## 2017-11-16 VITALS — OXYGEN SATURATION: 95 % | WEIGHT: 199 LBS | HEART RATE: 79 BPM | BODY MASS INDEX: 27.37 KG/M2

## 2017-11-16 DIAGNOSIS — R04.2 HEMOPTYSIS: Primary | ICD-10-CM

## 2017-11-16 DIAGNOSIS — R09.A2 GLOBUS PHARYNGEUS: ICD-10-CM

## 2017-11-16 PROCEDURE — 31575 DIAGNOSTIC LARYNGOSCOPY: CPT | Performed by: OTOLARYNGOLOGY

## 2017-11-16 PROCEDURE — 99243 OFF/OP CNSLTJ NEW/EST LOW 30: CPT | Mod: 25 | Performed by: OTOLARYNGOLOGY

## 2017-11-16 NOTE — MR AVS SNAPSHOT
"              After Visit Summary   2017    Rebel Garcia    MRN: [id number]           Patient Information     Date Of Birth          1965        Visit Information        Provider Department      2017 8:45 AM Jude Vega MD [facility]        Today's Diagnoses     Hemoptysis    -  1    Globus pharyngeus           Follow-ups after your visit        Who to contact     If you have questions or need follow up information about today's clinic visit or your schedule please contact [facility] directly at [phone].  Normal or non-critical lab and imaging results will be communicated to you by [employer], letter or phone within 4 business days after the clinic has received the results. If you do not hear from us within 7 days, please contact the clinic through [employer] or phone. If you have a critical or abnormal lab result, we will notify you by phone as soon as possible.  Submit refill requests through [facility] or call your pharmacy and they will forward the refill request to us. Please allow 3 business days for your refill to be completed.          Additional Information About Your Visit        MyChart Information     [facility] lets you send messages to your doctor, view your test results, renew your prescriptions, schedule appointments and more. To sign up, go to [email or web address] . Click on \"Log in\" on the left side of the screen, which will take you to the Welcome page. Then click on \"Sign up Now\" on the right side of the page.     You will be asked to enter the access code listed below, as well as some personal information. Please follow the directions to create your username and password.     Your access code is: [email or web address]  Expires: 2018  2:38 PM     Your access code will  in 90 days. If you need help or a new code, please call your [facility] or [phone].        Care EveryWhere ID     This is your Care EveryWhere ID. This could be used " by other organizations to access your [place] medical records  [id number]        Your Vitals Were     Pulse Pulse Oximetry BMI (Body Mass Index)             79 95% 27.37 kg/m2          Blood Pressure from Last 3 Encounters:   11/13/17 118/76   06/14/17 122/82   08/17/16 122/70    Weight from Last 3 Encounters:   11/16/17 90.3 kg (199 lb)   11/13/17 90.3 kg (199 lb)   06/14/17 90.2 kg (198 lb 12.8 oz)              We Performed the Following     LARYNGOSCOPY FLEX FIBEROPTIC, DIAGNOSTIC        Primary Care Provider Office Phone # Fax #    Marilu Gomez PA-C [phone] [address] [place] [address]        Equal Access to Services     [facility] ALONDRA : Nikki kate Sobhupinder, waaxda luqadaha, qaybta kaalmada adeegyada, brenton cabrales . So [facility] [phone].    ATENCIÓN: Si habla español, tiene a pan disposición servicios gratuitos de asistencia lingüística. Llame al [phone].    We comply with applicable federal civil rights laws and [place] laws. We do not discriminate on the basis of race, color, national origin, age, disability, sex, sexual orientation, or gender identity.            Thank you!     Thank you for choosing [facility]  for your care. Our goal is always to provide you with excellent care. Hearing back from our patients is one way we can continue to improve our services. Please take a few minutes to complete the written survey that you may receive in the mail after your visit with us. Thank you!             Your Updated Medication List - Protect others around you: Learn how to safely use, store and throw away your medicines at www.disposemymeds.org.          This list is accurate as of: 11/16/17  9:23 AM.  Always use your most recent med list.                   Brand Name Dispense Instructions for use Diagnosis    amphetamine-dextroamphetamine 15 MG per 24 hr capsule   Start taking on:  12/17/2017    ADDERALL XR    30  capsule    Take 1 capsule (15 mg) by mouth daily    Attention deficit hyperactivity disorder (ADHD), unspecified ADHD type

## 2017-11-16 NOTE — LETTER
11/16/2017         RE: Rebel Garcia  [id number]   [place] [address]        Dear Colleague,    Thank you for referring your patient, Rebel Garcia, to the [facility]. Please see a copy of my visit note below.    ENT Consultation    Rebel Garcia is a 52 year old male who is seen in consultation at the request of Marilu Narayan.      History of Present Illness - Rebel aGrcia is a 52 year old male who presents with a sense of something in the throat since Saturday 11/11/17, sx started with a cough. He was seen by PCP on 11/13/17 when he started coughing up blood, a chest x-ray was done at that time. The sensation is constant. It is worse laying down. Salt water and Tylenol seems to make it better. The patient denies heartburn or regurgitation, and has not completed at least a 6 week trial of proton pump inhibitors twice daily. The patient confirms associated hoarseness. Hemoptysis only lasted for 2 days in total about 8 oz blood. No fevers or chills there was some sore throat sammy yesterday now feeling much better. He quit smoking 20 years ago.      Past Medical History -   Past Medical History:   Diagnosis Date     Migraine, unspecified, without mention of intractable migraine without mention of status migrainosus      Sleep apnea        Current Medications -   Current Outpatient Prescriptions:      [START ON 12/17/2017] amphetamine-dextroamphetamine (ADDERALL XR) 15 MG per 24 hr capsule, Take 1 capsule (15 mg) by mouth daily, Disp: 30 capsule, Rfl: 0    Allergies -   Allergies   Allergen Reactions     No Known Allergies        Social History -   Social History     Social History     Marital status:      Spouse name: N/A     Number of children: 2     Years of education: N/A     Occupational History     [employer]     Social History Main Topics     Smoking status: Former Smoker     Packs/day: 0.75     Years: 18.00     Types: Cigarettes     Quit date:  2006     Smokeless tobacco: Never Used     Alcohol use Yes      Comment: occ     Drug use: No     Sexual activity: Yes     Partners: Female     Birth control/ protection: Pill      Comment: S.O. 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 No     Social History Narrative       Family History -   Family History   Problem Relation Age of Onset     Allergies Father      iodine     HEART DISEASE Father      heart attack at 59 - CABG at 60     CEREBROVASCULAR DISEASE Father      CANCER Mother      colon -  at age 56     CANCER Sister      cervix -  at age 38     Neurologic Disorder Sister      CNS bleed x 2 sisters     Prostate Cancer Brother 58     Other Cancer Brother 62     stage 4 liver cancer     Colon Cancer Sister 68        Review of Systems -  General: negative for, fever, chills, night sweats, intentional weight loss, unplanned weight loss, headaches, dizziness, fatigue, weakness  ENT: positive for sore throat    Physical Exam  There were no vitals taken for this visit.    General - The patient is well nourished and well developed, and appears to have good nutritional status.  Alert and oriented to person and place, answers questions and cooperates with examination appropriately.     SKIN - No suspicious lesions or rashes.  Respiration - No respiratory distress.    Head and Face - Normocephalic and atraumatic, with no gross asymmetry noted of the contour of the facial features.  The facial nerve is intact, with strong symmetric movements.    Voice and  Breathing - The patient was breathing comfortably without the use of accessory muscles. There was no wheezing, stridor, or stertor.  The patients voice was normal.    Ears - Bilateral pinna and EACs with normal appearing overlying skin. Tympanic membrane intact with good mobility on pneumatic otoscopy bilaterally. Bony landmarks of the ossicular chain are normal. The tympanic membranes are normal in appearance. No retraction, perforation, or masses.  No fluid or purulence was seen in the external canal or the middle ear.     Eyes - Extraocular movements intact.  Sclera were not icteric or injected, conjunctiva were pink and moist.    Mouth - Examination of the oral cavity showed pink, healthy oral mucosa. No lesions or ulcerations noted.  The tongue was mobile and midline, and the dentition were in good condition.      Throat - The walls of the oropharynx were smooth, pink, moist, symmetric, and had no lesions or ulcerations.  The tonsillar pillars and soft palate were symmetric.  The uvula was midline on elevation. Tonsils 2+ without exudates or asymmetry.    Neck - Normal midline excursion of the laryngotracheal complex during swallowing.  Full range of motion on passive movement.  Palpation of the occipital, submental, submandibular, internal jugular chain, and supraclavicular nodes did not demonstrate any abnormal lymph nodes or masses.  The carotid pulse was palpable bilaterally.  Palpation of the thyroid was soft and smooth, with no nodules or goiter appreciated.  The trachea was mobile and midline.    Nose - External contour is symmetric, no gross deflection or scars.  Nasal mucosa is pink and moist with no abnormal mucus.  The septum was midline and non-obstructive, turbinates of normal size and position.  No polyps, masses, or purulence noted on examination.    Procedure: Flexible Endoscopy  Indication: globus sensation    Performed in clinic today:  Attempts at mirror laryngoscopy were not possible due to  gag reflex.  Therefore I proceeded with a fiberoptic examination.  First I sprayed both sides of the nose with a mixture of lidocaine and neosynephrine.  I then passed the scope through the nasal cavity.  The nasal cavity was unremarkable.  The nasopharynx was mucosally covered and symmetric.  The Eustachian tube openings were unobstructed.  Going further down I had a clear view of the base of tongue which had normal appearing lingual tonsillar tissue.  The base of tongue was free of lesions, and the vallecula was open.  The epiglottis was smooth and mucosally covered.  The supraglottic larynx was then clearly visualized.  The vocal cords moved smoothly and symmetrically, they were pearly white and no lesions were seen.  The pyriform sinuses were open, and the limited view of the postcricoid region did not show any lesions. Lingual tonsil 2+ but without supicious lesions or asymmetry. Dark Blue - [id number] [facility]      A/P - Rebel Garcia is a 52 year old male with globus sensation that is better and 2 episodes of hemoptysis. He could have had URI and coughing hard may have opened small blood vessel. Considering that the symptoms are much improved will observe conservatively.      Rebel will follow up prn.      Jude Vega MD      Again, thank you for allowing me to participate in the care of your patient.        Sincerely,        Jude Vega MD, MD

## 2017-11-16 NOTE — NURSING NOTE
"Chief Complaint   Patient presents with     Consult     Referring Marilu Buenrostrosor     Throat Problem     coughing up blood       Initial Pulse 79  Wt 90.3 kg (199 lb)  SpO2 95%  BMI 27.37 kg/m2 Estimated body mass index is 27.37 kg/(m^2) as calculated from the following:    Height as of 6/14/17: 1.816 m (5' 11.5\").    Weight as of this encounter: 90.3 kg (199 lb).  Medication Reconciliation: complete  "

## 2017-12-01 ENCOUNTER — TELEPHONE (OUTPATIENT)
Dept: FAMILY MEDICINE | Facility: OTHER | Age: 52
End: 2017-12-01

## 2017-12-01 NOTE — LETTER
[facility]  [address] [address]  [phone]          December 1, 2017    Rebel Garcia                                                                                                                     [id number]  [facility] [address]            Dear Rebel,  We received a notice that you are to be scheduled with a specialty clinic. The referral has been placed by your provider and you can call to schedule an appointment directly.     Enclosed, you will find the referral with the phone number to call to schedule an appointment.  If you have already scheduled this, you may disregard this letter.    Please call us if you have any questions or concerns.        Sincerely,         Marilu Gomez PA-C

## 2017-12-01 NOTE — LETTER
[facility]  [address] [address]  [phone]          December 1, 2017    Rebel Garcia                                                                                                                     [id number]  [facility] [address]            Dear Rebel,    We received a notice that you are to be scheduled with a specialty clinic. The referral has been placed by your provider and you can call to schedule an appointment directly.     Enclosed, you will find the referral with the phone number to call to schedule an appointment.  If you have already scheduled this, you may disregard this letter.    Please call us if you have any questions or concerns.      Sincerely,         Marilu Gomez PA-C

## 2017-12-01 NOTE — TELEPHONE ENCOUNTER
You placed a referral for patient to pulmonology on 11/13/17.  Patient has not scheduled as of yet.      Please review and forward to team if follow up with the patient is needed.     Thank you!  Jayshree/Clinic Referrals Dyad II

## 2018-03-13 ENCOUNTER — TELEPHONE (OUTPATIENT)
Dept: FAMILY MEDICINE | Facility: OTHER | Age: 53
End: 2018-03-13

## 2018-03-13 NOTE — TELEPHONE ENCOUNTER
Reason for call:  Patient reporting a symptom    Symptom or request: testicular pain    Duration (how long have symptoms been present): unknown    Have you been treated for this before? No    Additional comments: patient's wife called and scheduled the appointment. She states patient is embarrassed about calling.  They aren't requesting a call back.      Phone Number patient can be reached at:  Work number on file:  [phone] (work) or Cell number on file:    Telephone Information:   Mobile [phone]       Best Time:  N/A    Can we leave a detailed message on this number:  Not Applicable    Call taken on 3/13/2018 at 8:25 AM by Marietta Zimmerman

## 2018-03-13 NOTE — TELEPHONE ENCOUNTER
Rebel Garcia is a 52 year old male who calls with testicular pain.    NURSING ASSESSMENT:  Description:  Pt is scheduled on [facility]'s schedule this Friday for testicular pain.  Called pt to get more information to make sure he didn't need to be seen sooner.  Onset/duration:  A month ago  Precip. factors:  unknonw  Associated symptoms:  Intermittent testicular pain.  Denies swelling, urinary symptoms, lump.  Improves/worsens symptoms:  Lifting seems to aggravate the pain, Ibuprofen helps with the pain.  Pain scale (0-10)   7/10    Allergies:   Allergies   Allergen Reactions     No Known Allergies        RECOMMENDED DISPOSITION:  See in 72 hours - okay'd to wait until Friday per Benedict Vance PA-C.  Will comply with recommendation: Yes  If further questions/concerns or if symptoms do not improve, worsen or new symptoms develop, call your PCP or [facility] as soon as possible.      Guideline used: Genital Problems, Male  Telephone Triage Protocols for Nurses, Fifth Edition, KEAGAN Myers RN

## 2018-03-13 NOTE — PROGRESS NOTES
SUBJECTIVE:   Rebel Garcia is a 52 year old male who presents to clinic today for the following health issues:      HPI  Concern - Groin pain  Onset: 1 month    Description:   Pain in testicular area    Intensity: mild, severe    Progression of Symptoms:  intermittent    Accompanying Signs & Symptoms:  Testicular swelling off and on   Pain off and on  Denies masses    Previous history of similar problem:   None     Precipitating factors:   Worsened by: Lifting    Alleviating factors:  Improved by: Ibuprofen    Therapies Tried and outcome: Ibuprofen  Problem list and histories reviewed & adjusted, as indicated.  Additional history: as documented    Patient Active Problem List   Diagnosis     Pain in joint, lower leg     Encounter for other general counseling or advice on contraception     Encounter for sterilization     ESOPHAGEAL REFLUX     Anxiety     CARDIOVASCULAR SCREENING; LDL GOAL LESS THAN 160     Family history of colon cancer     Family history of prostate cancer     Attention deficit hyperactivity disorder (ADHD)     Past Surgical History:   Procedure Laterality Date     COLONOSCOPY  2/25/2000     COLONOSCOPY  02/16/10     COLONOSCOPY N/A 7/17/2015    Procedure: COMBINED COLONOSCOPY, SINGLE OR MULTIPLE BIOPSY/POLYPECTOMY BY BIOPSY;  Surgeon: Dima Sosa MD;  Location:  GI     HC CORRECT KIM BORREGO/DARNELL/JILLIAN  05/04/2005    Modified Darnell bunionectomy, right foot.     HC REPAIR UMBILICAL HARDY,5+Y/O, INCARCERATED OR STRANGULATED  6/15/2004     HERNIA REPAIR, INCISIONAL  08/25/2006    Incarcerated recurrent ventral hernia.     HERNIORRHAPHY UMBILICAL  3/29/2011    HERNIORRHAPHY UMBILICAL performed by ALOK WESLEY at  OR       Social History   Substance Use Topics     Smoking status: Former Smoker     Packs/day: 0.75     Years: 18.00     Types: Cigarettes     Quit date: 2/14/2006     Smokeless tobacco: Never Used     Alcohol use Yes      Comment: occ     Family History  "  Problem Relation Age of Onset     Allergies Father      iodine     HEART DISEASE Father      heart attack at 59 - CABG at 60     CEREBROVASCULAR DISEASE Father      CANCER Mother      colon -  at age 56     CANCER Sister      cervix -  at age 38     Neurologic Disorder Sister      CNS bleed x 2 sisters     Prostate Cancer Brother 58     Other Cancer Brother 62     stage 4 liver cancer     Colon Cancer Sister 68         Current Outpatient Prescriptions   Medication Sig Dispense Refill     doxycycline Monohydrate 100 MG TABS Take 100 mg by mouth 2 times daily for 14 days 28 tablet 0     amphetamine-dextroamphetamine (ADDERALL XR) 15 MG per 24 hr capsule Take 1 capsule (15 mg) by mouth daily 30 capsule 0     Allergies   Allergen Reactions     No Known Allergies      Recent Labs   Lab Test  14   1200  13   1246  11   0922   02/01/10   0918   LDL  82   --   94   --    --    HDL  61   --   71   --    --    TRIG  140   --   105   --    --    ALT   --    --    --    --   21   TSH   --   1.14  1.58   < >   --     < > = values in this interval not displayed.      BP Readings from Last 3 Encounters:   18 122/68   17 118/76   17 122/82    Wt Readings from Last 3 Encounters:   18 195 lb 12.8 oz (88.8 kg)   17 199 lb (90.3 kg)   17 199 lb (90.3 kg)                  Labs reviewed in EPIC    ROS:  Constitutional, HEENT, cardiovascular, pulmonary, gi and gu systems are negative, except as otherwise noted.    OBJECTIVE:     /68 (Cuff Size: Adult Regular)  Pulse 60  Temp 98.1  F (36.7  C) (Temporal)  Ht 5' 11.5\" (1.816 m)  Wt 195 lb 12.8 oz (88.8 kg)  BMI 26.93 kg/m2  Body mass index is 26.93 kg/(m^2).  GENERAL: healthy, alert and no distress  NECK: no adenopathy, no asymmetry, masses, or scars and thyroid normal to palpation  RESP: lungs clear to auscultation - no rales, rhonchi or wheezes  CV: regular rate and rhythm, normal S1 S2, no S3 or S4, no " murmur, click or rub, no peripheral edema and peripheral pulses strong  ABDOMEN: soft, nontender, no hepatosplenomegaly, no masses and bowel sounds normal   (male): epididymal enlargement right, no hernias, penis normal without urethral discharge and tenderness to palpation of what is thought to be a spermatocele with history of vasectomy.  MS: no gross musculoskeletal defects noted, no edema  SKIN: no suspicious lesions or rashes  NEURO: Normal strength and tone, mentation intact and speech normal  PSYCH: mentation appears normal, affect normal/bright    Diagnostic Test Results:  Results for orders placed or performed in visit on 03/16/18 (from the past 24 hour(s))   *UA reflex to Microscopic and Culture ([place] and [facility] (except [place] and [place])   Result Value Ref Range    Color Urine Yellow     Appearance Urine Clear     Glucose Urine Negative NEG^Negative mg/dL    Bilirubin Urine Negative NEG^Negative    Ketones Urine 40 (A) NEG^Negative mg/dL    Specific Gravity Urine 1.025 1.003 - 1.035    Blood Urine Negative NEG^Negative    pH Urine 5.5 5.0 - 7.0 pH    Protein Albumin Urine Negative NEG^Negative mg/dL    Urobilinogen Urine 0.2 0.2 - 1.0 EU/dL    Nitrite Urine Negative NEG^Negative    Leukocyte Esterase Urine Negative NEG^Negative    Source Unspecified Urine        ASSESSMENT/PLAN:     1. Groin pain, right  2. Testicular pain, right  3. S/P vasectomy  4. Spermatocele  Suspected diagnosis based on presentation and history.  U/S pending.  - US Testicular & Scrotum w Doppler Ltd; Future  - doxycycline Monohydrate 100 MG TABS; Take 100 mg by mouth 2 times daily for 14 days  Dispense: 28 tablet; Refill: 0  - PSA, screen  - *UA reflex to Microscopic and Culture ([place] [facility] (except [facility] and [facility])    ROV PRZAID Vance PA-C  [facility]

## 2018-03-14 ENCOUNTER — TELEPHONE (OUTPATIENT)
Dept: FAMILY MEDICINE | Facility: OTHER | Age: 53
End: 2018-03-14

## 2018-03-14 NOTE — TELEPHONE ENCOUNTER
Reason for Call:  Same Day Appointment, Requested Provider:  any    PCP: Marilu Gomez    Reason for visit: testicular pain    Duration of symptoms: patient is scheduled for Friday with Benedict    Have you been treated for this in the past? No    Additional comments: is wondering if he could be worked in today in [place]?     Can we leave a detailed message on this number? YES    Phone number patient can be reached at: Home number on file [phone] (home)    Best Time: any    Call taken on 3/14/2018 at 3:10 PM by Lluvia Rasmussen

## 2018-03-16 ENCOUNTER — TELEPHONE (OUTPATIENT)
Dept: FAMILY MEDICINE | Facility: OTHER | Age: 53
End: 2018-03-16

## 2018-03-16 ENCOUNTER — OFFICE VISIT (OUTPATIENT)
Dept: FAMILY MEDICINE | Facility: OTHER | Age: 53
End: 2018-03-16
Payer: COMMERCIAL

## 2018-03-16 VITALS
DIASTOLIC BLOOD PRESSURE: 68 MMHG | TEMPERATURE: 98.1 F | WEIGHT: 195.8 LBS | HEIGHT: 72 IN | HEART RATE: 60 BPM | BODY MASS INDEX: 26.52 KG/M2 | SYSTOLIC BLOOD PRESSURE: 122 MMHG

## 2018-03-16 DIAGNOSIS — N43.40 SPERMATOCELE: ICD-10-CM

## 2018-03-16 DIAGNOSIS — Z23 NEED FOR VACCINATION: ICD-10-CM

## 2018-03-16 DIAGNOSIS — Z98.52 S/P VASECTOMY: ICD-10-CM

## 2018-03-16 DIAGNOSIS — R10.31 GROIN PAIN, RIGHT: Primary | ICD-10-CM

## 2018-03-16 DIAGNOSIS — N50.811 TESTICULAR PAIN, RIGHT: ICD-10-CM

## 2018-03-16 LAB
ALBUMIN UR-MCNC: NEGATIVE MG/DL
APPEARANCE UR: CLEAR
BILIRUB UR QL STRIP: NEGATIVE
COLOR UR AUTO: YELLOW
GLUCOSE UR STRIP-MCNC: NEGATIVE MG/DL
HGB UR QL STRIP: NEGATIVE
KETONES UR STRIP-MCNC: 40 MG/DL
LEUKOCYTE ESTERASE UR QL STRIP: NEGATIVE
NITRATE UR QL: NEGATIVE
PH UR STRIP: 5.5 PH (ref 5–7)
SOURCE: ABNORMAL
SP GR UR STRIP: 1.02 (ref 1–1.03)
UROBILINOGEN UR STRIP-ACNC: 0.2 EU/DL (ref 0.2–1)

## 2018-03-16 PROCEDURE — 99214 OFFICE O/P EST MOD 30 MIN: CPT | Performed by: PHYSICIAN ASSISTANT

## 2018-03-16 PROCEDURE — 90715 TDAP VACCINE 7 YRS/> IM: CPT | Performed by: PHYSICIAN ASSISTANT

## 2018-03-16 PROCEDURE — 81003 URINALYSIS AUTO W/O SCOPE: CPT | Performed by: PHYSICIAN ASSISTANT

## 2018-03-16 RX ORDER — DOXYCYCLINE 100 MG/1
100 TABLET ORAL 2 TIMES DAILY
Qty: 28 TABLET | Refills: 0 | Status: SHIPPED | OUTPATIENT
Start: 2018-03-16 | End: 2018-03-30

## 2018-03-16 ASSESSMENT — PAIN SCALES - GENERAL: PAINLEVEL: MILD PAIN (3)

## 2018-03-16 NOTE — MR AVS SNAPSHOT
After Visit Summary   3/16/2018    Rebel Garcia    MRN: [id number]           Patient Information     Date Of Birth          1965        Visit Information        Provider Department      3/16/2018 3:20 PM Benedict Flynn PA-C [facility]        Today's Diagnoses     Groin pain, right    -  1    Testicular pain, right        S/P vasectomy        Spermatocele           Follow-ups after your visit        Your next 10 appointments already scheduled     Mar 19, 2018  5:00 PM CDT   US TESTICULAR AND SCROTUM WITH DOPPLER LIMITED with PHUS1   [facility] ([facility])    [address] [place] [address] [phone]           Please bring a list of your medicines (including vitamins, minerals and over-the-counter drugs). Also, tell your doctor about any allergies you may have. Wear comfortable clothes and leave your valuables at home.  You do not need to do anything special to prepare for your exam.  Please call the Imaging Department at your exam site with any questions.              Future tests that were ordered for you today     Open Future Orders        Priority Expected Expires Ordered    US Testicular & Scrotum w Doppler Ltd Routine  3/16/2019 3/16/2018            Who to contact     If you have questions or need follow up information about today's clinic visit or your schedule please contact [facility] directly at [phone].  Normal or non-critical lab and imaging results will be communicated to you by MyChart, letter or phone within 4 business days after the clinic has received the results. If you do not hear from us within 7 days, please contact the clinic through MyChart or phone. If you have a critical or abnormal lab result, we will notify you by phone as soon as possible.  Submit refill requests through [employer] or call your pharmacy and they will forward the refill request to us. Please allow 3 business days  "for your refill to be completed.          Additional Information About Your Visit        [employer] Information     [employer] lets you send messages to your doctor, view your test results, renew your prescriptions, schedule appointments and more. To sign up, go to [email or web address] . Click on \"Log in\" on the left side of the screen, which will take you to the Welcome page. Then click on \"Sign up Now\" on the right side of the page.     You will be asked to enter the access code listed below, as well as some personal information. Please follow the directions to create your username and password.     Your access code is: [email or web address]  Expires: 2018  3:43 PM     Your access code will  in 90 days. If you need help or a new code, please call your [facility] clinic or [phone].        Care EveryWhere ID     This is your [facility] EveryWhere ID. This could be used by other organizations to access your [facility] medical records  [id number]        Your Vitals Were     Pulse Temperature Height BMI (Body Mass Index)          60 98.1  F (36.7  C) (Temporal) 5' 11.5\" (1.816 m) 26.93 kg/m2         Blood Pressure from Last 3 Encounters:   18 122/68   17 118/76   17 122/82    Weight from Last 3 Encounters:   18 195 lb 12.8 oz (88.8 kg)   17 199 lb (90.3 kg)   17 199 lb (90.3 kg)              We Performed the Following     *UA reflex to Microscopic and Culture ([place] and [facility] (except [place] and [place])     PSA, screen          Today's Medication Changes          These changes are accurate as of 3/16/18  3:47 PM.  If you have any questions, ask your nurse or doctor.               Start taking these medicines.        Dose/Directions    doxycycline Monohydrate 100 MG Tabs   Used for:  Spermatocele, S/P vasectomy, Testicular pain, right, Groin pain, right   Started by:  Benedict Flynn PA-C        Dose:  100 mg   Take 100 mg by mouth 2 times daily for 14 days   Quantity:  28 " tablet   Refills:  0            Where to get your medicines      These medications were sent to [facility] - [address]   [address] [place] [address]     Phone:  [phone]     doxycycline Monohydrate 100 MG Tabs                Primary Care Provider Office Phone # Fax #    Marilu Gomez PA-C [phone] [address] [place] [address]        Equal Access to Services     [facility]: Hadii aad ku hadasho Soomaali, waaxda luqadaha, qaybta kaalmada adeegyada, waxay idiin hayaan adeeg kharash la'aan . So [facility] [phone].    ATENCIÓN: Si habla español, tiene a pan disposición servicios gratuitos de asistencia lingüística. [facility] [phone].    We comply with applicable federal civil rights laws and [place] laws. We do not discriminate on the basis of race, color, national origin, age, disability, sex, sexual orientation, or gender identity.            Thank you!     Thank you for choosing [facility]  for your care. Our goal is always to provide you with excellent care. Hearing back from our patients is one way we can continue to improve our services. Please take a few minutes to complete the written survey that you may receive in the mail after your visit with us. Thank you!             Your Updated Medication List - Protect others around you: Learn how to safely use, store and throw away your medicines at www.disposemymeds.org.          This list is accurate as of 3/16/18  3:47 PM.  Always use your most recent med list.                   Brand Name Dispense Instructions for use Diagnosis    amphetamine-dextroamphetamine 15 MG per 24 hr capsule    ADDERALL XR    30 capsule    Take 1 capsule (15 mg) by mouth daily    Attention deficit hyperactivity disorder (ADHD), unspecified ADHD type       doxycycline Monohydrate 100 MG Tabs     28 tablet    Take 100 mg by mouth 2 times daily for 14 days    Spermatocele, S/P  vasectomy, Testicular pain, right, Groin pain, right

## 2018-03-16 NOTE — NURSING NOTE
"Chief Complaint   Patient presents with     Testicular pain     Panel Management     Tdap       Initial /68 (Cuff Size: Adult Regular)  Pulse 60  Temp 98.1  F (36.7  C) (Temporal)  Ht 5' 11.5\" (1.816 m)  Wt 195 lb 12.8 oz (88.8 kg)  BMI 26.93 kg/m2 Estimated body mass index is 26.93 kg/(m^2) as calculated from the following:    Height as of this encounter: 5' 11.5\" (1.816 m).    Weight as of this encounter: 195 lb 12.8 oz (88.8 kg).  Medication Reconciliation: complete  "

## 2018-03-16 NOTE — NURSING NOTE
Prior to injection verified patient identity using patient's name and date of birth.  Screening Questionnaire for Adult Immunization    Are you sick today?   No   Do you have allergies to medications, food, a vaccine component or latex?   No   Have you ever had a serious reaction after receiving a vaccination?   No   Do you have a long-term health problem with heart disease, lung disease, asthma, kidney disease, metabolic disease (e.g. diabetes), anemia, or other blood disorder?   No   Do you have cancer, leukemia, HIV/AIDS, or any other immune system problem?   No   In the past 3 months, have you taken medications that affect  your immune system, such as prednisone, other steroids, or anticancer drugs; drugs for the treatment of rheumatoid arthritis, Crohn s disease, or psoriasis; or have you had radiation treatments?   No   Have you had a seizure, or a brain or other nervous system problem?   No   During the past year, have you received a transfusion of blood or blood     products, or been given immune (gamma) globulin or antiviral drug?   No   For women: Are you pregnant or is there a chance you could become        pregnant during the next month?   No   Have you received any vaccinations in the past 4 weeks?   No     Immunization questionnaire answers were all negative.        Per orders of Benedict Vance, injection of Tdap given by Sharmaine Shaffer. Patient instructed to remain in clinic for 15 minutes afterwards, and to report any adverse reaction to me immediately.       Screening performed by Sharmaine Shaffer on 3/16/2018 at 4:19 PM.

## 2018-03-19 ENCOUNTER — HOSPITAL ENCOUNTER (OUTPATIENT)
Dept: ULTRASOUND IMAGING | Facility: CLINIC | Age: 53
Discharge: HOME | End: 2018-03-19
Attending: PHYSICIAN ASSISTANT | Admitting: PHYSICIAN ASSISTANT
Payer: COMMERCIAL

## 2018-03-19 DIAGNOSIS — Z98.52 S/P VASECTOMY: ICD-10-CM

## 2018-03-19 DIAGNOSIS — R10.31 GROIN PAIN, RIGHT: ICD-10-CM

## 2018-03-19 DIAGNOSIS — N43.40 SPERMATOCELE: ICD-10-CM

## 2018-03-19 DIAGNOSIS — N50.811 TESTICULAR PAIN, RIGHT: ICD-10-CM

## 2018-03-19 LAB — PSA SERPL-ACNC: 0.73 UG/L (ref 0–4)

## 2018-03-19 PROCEDURE — G0103 PSA SCREENING: HCPCS | Performed by: PHYSICIAN ASSISTANT

## 2018-03-19 PROCEDURE — 93976 VASCULAR STUDY: CPT

## 2018-07-30 ENCOUNTER — OFFICE VISIT (OUTPATIENT)
Dept: FAMILY MEDICINE | Facility: OTHER | Age: 53
End: 2018-07-30
Payer: COMMERCIAL

## 2018-07-30 VITALS
SYSTOLIC BLOOD PRESSURE: 136 MMHG | DIASTOLIC BLOOD PRESSURE: 80 MMHG | RESPIRATION RATE: 16 BRPM | TEMPERATURE: 98.1 F | BODY MASS INDEX: 27.29 KG/M2 | WEIGHT: 198.4 LBS | HEART RATE: 64 BPM

## 2018-07-30 DIAGNOSIS — L73.9 FOLLICULITIS: Primary | ICD-10-CM

## 2018-07-30 PROCEDURE — 99213 OFFICE O/P EST LOW 20 MIN: CPT | Performed by: PHYSICIAN ASSISTANT

## 2018-07-30 RX ORDER — CEPHALEXIN 500 MG/1
500 CAPSULE ORAL 3 TIMES DAILY
Qty: 30 CAPSULE | Refills: 0 | Status: SHIPPED | OUTPATIENT
Start: 2018-07-30 | End: 2018-08-06

## 2018-07-30 ASSESSMENT — PAIN SCALES - GENERAL: PAINLEVEL: NO PAIN (0)

## 2018-07-30 NOTE — PROGRESS NOTES
SUBJECTIVE:   Rebel Garcia is a 52 year old male who presents to clinic today for the following health issues:      HPI  Rash  Onset: 2 weeks    Description:   Location: both sides under arm pits  Character: round, raised, red  Itching (Pruritis): no     Progression of Symptoms:  worsening    Accompanying Signs & Symptoms:  Fever: no   Body aches or joint pain: no   Sore throat symptoms: no   Recent cold symptoms: no     History:   Previous similar rash: no     Precipitating factors:   Exposure to similar rash: no   [place] exposures[place]    Recent travel: no     Alleviating factors:  none    Therapies Tried and outcome: none  Problem list and histories reviewed & adjusted, as indicated.  Additional history: as documented    Patient Active Problem List   Diagnosis     Pain in joint, lower leg     Encounter for other general counseling or advice on contraception     Encounter for sterilization     ESOPHAGEAL REFLUX     Anxiety     CARDIOVASCULAR SCREENING; LDL GOAL LESS THAN 160     Family history of colon cancer     Family history of prostate cancer     Attention deficit hyperactivity disorder (ADHD)     Past Surgical History:   Procedure Laterality Date     COLONOSCOPY  2/25/2000     COLONOSCOPY  02/16/10     COLONOSCOPY N/A 7/17/2015    Procedure: COMBINED COLONOSCOPY, SINGLE OR MULTIPLE BIOPSY/POLYPECTOMY BY BIOPSY;  Surgeon: Dima Sosa MD;  Location:  GI     HC CORRECT KIM BORREGO/DARNELL/JILLIAN  05/04/2005    Modified Darnell bunionectomy, right foot.     HC REPAIR UMBILICAL HARDY,5+Y/O, INCARCERATED OR STRANGULATED  6/15/2004     HERNIA REPAIR, INCISIONAL  08/25/2006    Incarcerated recurrent ventral hernia.     HERNIORRHAPHY UMBILICAL  3/29/2011    HERNIORRHAPHY UMBILICAL performed by ALOK WESLEY at  OR       Social History   Substance Use Topics     Smoking status: Former Smoker     Packs/day: 0.75     Years: 18.00     Types: Cigarettes     Quit date: 2/14/2006      Smokeless tobacco: Never Used     Alcohol use Yes      Comment: occ     Family History   Problem Relation Age of Onset     Allergies Father      iodine     HEART DISEASE Father      heart attack at 59 - CABG at 60     Cerebrovascular Disease Father      Cancer Mother      colon -  at age 56     Cancer Sister      cervix -  at age 38     Neurologic Disorder Sister      CNS bleed x 2 sisters     Prostate Cancer Brother 58     Other Cancer Brother 62     stage 4 liver cancer     Colon Cancer Sister 68         Current Outpatient Prescriptions   Medication Sig Dispense Refill     cephALEXin (KEFLEX) 500 MG capsule Take 1 capsule (500 mg) by mouth 3 times daily 30 capsule 0     Allergies   Allergen Reactions     No Known Allergies      BP Readings from Last 3 Encounters:   18 136/80   18 122/68   17 118/76    Wt Readings from Last 3 Encounters:   18 198 lb 6.4 oz (90 kg)   18 195 lb 12.8 oz (88.8 kg)   17 199 lb (90.3 kg)                    ROS:  Constitutional, HEENT, cardiovascular, pulmonary, gi and gu systems are negative, except as otherwise noted.    OBJECTIVE:     /80 (Cuff Size: Adult Regular)  Pulse 64  Temp 98.1  F (36.7  C) (Temporal)  Resp 16  Wt 198 lb 6.4 oz (90 kg)  BMI 27.29 kg/m2  Body mass index is 27.29 kg/(m^2).  GENERAL: healthy, alert and no distress  RESP: lungs clear to auscultation - no rales, rhonchi or wheezes  CV: regular rate and rhythm, normal S1 S2, no S3 or S4, no murmur, click or rub, no peripheral edema and peripheral pulses strong  ABDOMEN: soft, nontender, no hepatosplenomegaly, no masses and bowel sounds normal  MS: no gross musculoskeletal defects noted, no edema  SKIN: He has several skin tags that have been there for quite some time to both armpits.  We note the lateral chest wall as well as the axillary region to have follicular areas of erythema and raised skin.  Initially his thoughts were for triggers however they do  not itch at all.  No pain is noted with these either.  At this point time I would suspect folliculitis and we discuss this versus any type of bug bites in this area.  It may indeed be due to his tubing [place] [facility] [place].    Diagnostic Test Results:  none     ASSESSMENT/PLAN:     1. Folliculitis  Treat as noted below.  Follow-up as needed.  - cephALEXin (KEFLEX) 500 MG capsule; Take 1 capsule (500 mg) by mouth 3 times daily  Dispense: 30 capsule; Refill: 0    Benedict Vance PA-C  [facility]

## 2018-07-30 NOTE — MR AVS SNAPSHOT
After Visit Summary   7/30/2018    Rebel Garcia    MRN: [id number]           Patient Information     Date Of Birth          1965        Visit Information        Provider Department      7/30/2018 3:40 PM Benedict Flynn PA-C [facility]        Today's Diagnoses     Folliculitis    -  1       Follow-ups after your visit        Who to contact     If you have questions or need follow up information about today's clinic visit or your schedule please contact [facility] directly at [phone].  Normal or non-critical lab and imaging results will be communicated to you by MyChart, letter or phone within 4 business days after the clinic has received the results. If you do not hear from us within 7 days, please contact the clinic through MyChart or phone. If you have a critical or abnormal lab result, we will notify you by phone as soon as possible.  Submit refill requests through [employer] or call your pharmacy and they will forward the refill request to us. Please allow 3 business days for your refill to be completed.          Additional Information About Your Visit        Care EveryWhere ID     This is your Care EveryWhere ID. This could be used by other organizations to access your [facility] medical records  [id number]        Your Vitals Were     Pulse Temperature Respirations BMI (Body Mass Index)          64 98.1  F (36.7  C) (Temporal) 16 27.29 kg/m2         Blood Pressure from Last 3 Encounters:   07/30/18 136/80   03/16/18 122/68   11/13/17 118/76    Weight from Last 3 Encounters:   07/30/18 198 lb 6.4 oz (90 kg)   03/16/18 195 lb 12.8 oz (88.8 kg)   11/16/17 199 lb (90.3 kg)              Today, you had the following     No orders found for display         Today's Medication Changes          These changes are accurate as of 7/30/18  4:01 PM.  If you have any questions, ask your nurse or doctor.               Start taking these medicines.        Dose/Directions     cephALEXin 500 MG capsule   Commonly known as:  KEFLEX   Used for:  Folliculitis   Started by:  Benedict Flynn PA-C        Dose:  500 mg   Take 1 capsule (500 mg) by mouth 3 times daily   Quantity:  30 capsule   Refills:  0         Stop taking these medicines if you haven't already. Please contact your care team if you have questions.     amphetamine-dextroamphetamine 15 MG per 24 hr capsule   Commonly known as:  ADDERALL XR   Stopped by:  Benedict Flynn PA-C                Where to get your medicines      These medications were sent to [facility] [place] [address]   [address] [place] [address]     Phone:  [phone]     cephALEXin 500 MG capsule                Primary Care Provider Office Phone # Fax #    Marilu Gomez PA-C [phone] [address] [place] [address]        Equal Access to Services     [facility]: Hadii aad ku hadasho Soomaali, waaxda luqadaha, qaybta kaalmada adeegyada, waxay idiin hayaan jonelle robertsarachelsea cabrales . So [facility] [phone].    ATENCIÓN: Si habla español, tiene a pan disposición servicios gratuitos de asistencia lingüística. Mar al [phone].    We comply with applicable federal civil rights laws and [place] laws. We do not discriminate on the basis of race, color, national origin, age, disability, sex, sexual orientation, or gender identity.            Thank you!     Thank you for choosing [facility]  for your care. Our goal is always to provide you with excellent care. Hearing back from our patients is one way we can continue to improve our services. Please take a few minutes to complete the written survey that you may receive in the mail after your visit with us. Thank you!             Your Updated Medication List - Protect others around you: Learn how to safely use, store and throw away your medicines at www.disposemymeds.org.          This list is accurate as of 7/30/18  4:01 PM.   Always use your most recent med list.                   Brand Name Dispense Instructions for use Diagnosis    cephALEXin 500 MG capsule    KEFLEX    30 capsule    Take 1 capsule (500 mg) by mouth 3 times daily    Folliculitis

## 2018-08-06 ENCOUNTER — TELEPHONE (OUTPATIENT)
Dept: FAMILY MEDICINE | Facility: OTHER | Age: 53
End: 2018-08-06

## 2018-08-06 ENCOUNTER — OFFICE VISIT (OUTPATIENT)
Dept: FAMILY MEDICINE | Facility: OTHER | Age: 53
End: 2018-08-06
Payer: COMMERCIAL

## 2018-08-06 VITALS
DIASTOLIC BLOOD PRESSURE: 70 MMHG | SYSTOLIC BLOOD PRESSURE: 108 MMHG | WEIGHT: 198.6 LBS | RESPIRATION RATE: 16 BRPM | TEMPERATURE: 98.7 F | HEART RATE: 80 BPM | BODY MASS INDEX: 27.31 KG/M2

## 2018-08-06 DIAGNOSIS — L73.9 FOLLICULITIS: Primary | ICD-10-CM

## 2018-08-06 PROCEDURE — 99213 OFFICE O/P EST LOW 20 MIN: CPT | Performed by: PHYSICIAN ASSISTANT

## 2018-08-06 RX ORDER — SULFAMETHOXAZOLE/TRIMETHOPRIM 800-160 MG
1 TABLET ORAL 2 TIMES DAILY
Qty: 20 TABLET | Refills: 0 | Status: SHIPPED | OUTPATIENT
Start: 2018-08-06 | End: 2018-08-23

## 2018-08-06 ASSESSMENT — PAIN SCALES - GENERAL: PAINLEVEL: NO PAIN (0)

## 2018-08-06 NOTE — PROGRESS NOTES
SUBJECTIVE:   Rebel Garcia is a 52 year old male who presents to clinic today for the following health issues:      HPI  Rash  Onset: 3 weeks    Description:   Location: Spreading all over  Character: round, raised, red  Itching (Pruritis): no     Progression of Symptoms:  worsening    Accompanying Signs & Symptoms:  Fever: no   Body aches or joint pain: no   Sore throat symptoms: no   Recent cold symptoms: no     History:   Previous similar rash: YES    Precipitating factors:   Exposure to similar rash: no   New exposures: None   Recent travel: no     Alleviating factors:  None    Therapies Tried and outcome: Keflex   Problem list and histories reviewed & adjusted, as indicated.  Additional history: as documented    Patient Active Problem List   Diagnosis     Pain in joint, lower leg     Encounter for other general counseling or advice on contraception     Encounter for sterilization     ESOPHAGEAL REFLUX     Anxiety     CARDIOVASCULAR SCREENING; LDL GOAL LESS THAN 160     Family history of colon cancer     Family history of prostate cancer     Attention deficit hyperactivity disorder (ADHD)     Past Surgical History:   Procedure Laterality Date     COLONOSCOPY  2/25/2000     COLONOSCOPY  02/16/10     COLONOSCOPY N/A 7/17/2015    Procedure: COMBINED COLONOSCOPY, SINGLE OR MULTIPLE BIOPSY/POLYPECTOMY BY BIOPSY;  Surgeon: Dima Sosa MD;  Location:  GI     HC CORRECT KIM BORREGO/DARNELL/JILLIAN  05/04/2005    Modified Darnell bunionectomy, right foot.     HC REPAIR UMBILICAL HARDY,5+Y/O, INCARCERATED OR STRANGULATED  6/15/2004     HERNIA REPAIR, INCISIONAL  08/25/2006    Incarcerated recurrent ventral hernia.     HERNIORRHAPHY UMBILICAL  3/29/2011    HERNIORRHAPHY UMBILICAL performed by ALOK WESLEY at  OR       Social History   Substance Use Topics     Smoking status: Former Smoker     Packs/day: 0.75     Years: 18.00     Types: Cigarettes     Quit date: 2/14/2006     Smokeless tobacco: Never  Used     Alcohol use Yes      Comment: occ     Family History   Problem Relation Age of Onset     Allergies Father      iodine     HEART DISEASE Father      heart attack at 59 - CABG at 60     Cerebrovascular Disease Father      Cancer Mother      colon -  at age 56     Cancer Sister      cervix -  at age 38     Neurologic Disorder Sister      CNS bleed x 2 sisters     Prostate Cancer Brother 58     Other Cancer Brother 62     stage 4 liver cancer     Colon Cancer Sister 68         Current Outpatient Prescriptions   Medication Sig Dispense Refill     sulfamethoxazole-trimethoprim (BACTRIM DS/SEPTRA DS) 800-160 MG per tablet Take 1 tablet by mouth 2 times daily 20 tablet 0     Allergies   Allergen Reactions     No Known Allergies      Recent Labs   Lab Test  14   1200  13   1246  11   0922   LDL  82   --   94   HDL  61   --   71   TRIG  140   --   105   TSH   --   1.14  1.58      BP Readings from Last 3 Encounters:   18 108/70   18 136/80   18 122/68    Wt Readings from Last 3 Encounters:   18 198 lb 9.6 oz (90.1 kg)   18 198 lb 6.4 oz (90 kg)   18 195 lb 12.8 oz (88.8 kg)                    ROS:  Constitutional, HEENT, cardiovascular, pulmonary, gi and gu systems are negative, except as otherwise noted.    OBJECTIVE:     /70 (Cuff Size: Adult Large)  Pulse 80  Temp 98.7  F (37.1  C) (Temporal)  Resp 16  Wt 198 lb 9.6 oz (90.1 kg)  BMI 27.31 kg/m2  Body mass index is 27.31 kg/(m^2).  GENERAL: healthy, alert and no distress  RESP: lungs clear to auscultation - no rales, rhonchi or wheezes  CV: regular rate and rhythm, normal S1 S2, no S3 or S4, no murmur, click or rub, no peripheral edema and peripheral pulses strong  MS: no gross musculoskeletal defects noted, no edema  SKIN: He continues to have a follicular type rash to the axillary aspect of the chest wall as well as the lower abdomen, bilateral anterior upper thighs and a bit less to  the groin crease as well as the very low back and buttocks.  I have Dr. Amin review this with me and we both concur that it still appears to be more of a folliculitis of uncertain etiology.    Diagnostic Test Results:  No results found for this or any previous visit (from the past 24 hour(s)).    ASSESSMENT/PLAN:     1. Folliculitis  We will have him stop his Keflex and place him on Bactrim as directed below and observe.  He is to return in 2 weeks.  He is told that if he begins to have any type of diarrhea he needs to stop the Bactrim immediately.  - sulfamethoxazole-trimethoprim (BACTRIM DS/SEPTRA DS) 800-160 MG per tablet; Take 1 tablet by mouth 2 times daily  Dispense: 20 tablet; Refill: 0    Benedict Vance PA-C  [facility]

## 2018-08-06 NOTE — TELEPHONE ENCOUNTER
Responded via Chris Morales RT (R)         [employer] message was sent in from wife Ruth  ----- Message -----     From: Ruth Garcia     Sent: 8/6/2018  9:13 AM CDT       To: Marilu Gomez PA-C  Subject: A follow-up question for a visit within the past seven days    Hello!  I am actually writing a message for Rebel. He was seen last Monday for a chicken pox like rash. Was put on an antibiotic but is not getting better. Today it has started to itch.   We have been unable to get through to the appointment desk so I told him I would message you here.  Can you see him soon please?  Thanks!  Ruth Garcia (For Rebel Garcia)

## 2018-08-22 NOTE — PROGRESS NOTES
SUBJECTIVE:   Rebel Garcia is a 53 year old male who presents to clinic today for the following health issues:    The ASCVD Risk score ([place] DC Jr, et al., 2013) failed to calculate for the following reasons:    Cannot find a previous HDL lab    Cannot find a previous total cholesterol lab  Patient is eligible for use of low-dose aspirin for primary prevention of heart attack and stroke.  Provider has discussed aspirin with patient and our decision was:     Intentionally Not Prescribe:  Daily low-dose aspirin recommended for primary prevention, patient declines plan.        HPI  Rash  Onset: 6 weeks    Description:   Location: both sides  Character: round, raised, red  Itching (Pruritis): no     Progression of Symptoms:  worsening    Accompanying Signs & Symptoms:  Fever: no   Body aches or joint pain: no   Sore throat symptoms: no   Recent cold symptoms: no     History:   Previous similar rash: YES    Precipitating factors:   Exposure to similar rash: no   New exposures: None   Recent travel: no     Alleviating factors:  None    Therapies Tried and outcome: 2 antibiotics  Problem list and histories reviewed & adjusted, as indicated.  Additional history: Continues to struggle with what appears to be a follicular rash to his sides and groin region.  At this point time I am not certain what to make with of this instance we have tried antibiotics we discuss further attempts at treatment with a steroid burst and taper.  I advised that he be seen by dermatology to see if there is anything further they can recommend and go from there.  He does admit to perhaps a slight pruritic nature of the rash.    Patient Active Problem List   Diagnosis     Pain in joint, lower leg     Encounter for other general counseling or advice on contraception     Encounter for sterilization     ESOPHAGEAL REFLUX     Anxiety     CARDIOVASCULAR SCREENING; LDL GOAL LESS THAN 160     Family history of colon cancer     Family history of  prostate cancer     Attention deficit hyperactivity disorder (ADHD)     Past Surgical History:   Procedure Laterality Date     COLONOSCOPY  2000     COLONOSCOPY  02/16/10     COLONOSCOPY N/A 2015    Procedure: COMBINED COLONOSCOPY, SINGLE OR MULTIPLE BIOPSY/POLYPECTOMY BY BIOPSY;  Surgeon: Dima Sosa MD;  Location:  GI     HC CORRECT BUNION,KIM/DARNELL/WALSH  2005    Modified Cruz bunionectomy, right foot.     HC REPAIR UMBILICAL HARDY,5+Y/O, INCARCERATED OR STRANGULATED  6/15/2004     HERNIA REPAIR, INCISIONAL  2006    Incarcerated recurrent ventral hernia.     HERNIORRHAPHY UMBILICAL  3/29/2011    HERNIORRHAPHY UMBILICAL performed by ALOK WESLEY at  OR       Social History   Substance Use Topics     Smoking status: Former Smoker     Packs/day: 0.75     Years: 18.00     Types: Cigarettes     Quit date: 2006     Smokeless tobacco: Never Used     Alcohol use Yes      Comment: occ     Family History   Problem Relation Age of Onset     Allergies Father      iodine     HEART DISEASE Father      heart attack at 59 - CABG at 60     Cerebrovascular Disease Father      Cancer Mother      colon -  at age 56     Cancer Sister      cervix -  at age 38     Neurologic Disorder Sister      CNS bleed x 2 sisters     Prostate Cancer Brother 58     Other Cancer Brother 62     stage 4 liver cancer     Colon Cancer Sister 68         Current Outpatient Prescriptions   Medication Sig Dispense Refill     predniSONE (DELTASONE) 20 MG tablet Take 3 tabs (60 mg) by mouth daily x 3 days, 2 tabs (40 mg) daily x 3 days, 1 tab (20 mg) daily x 3 days, then 1/2 tab (10 mg) x 3 days. 20 tablet 0     Allergies   Allergen Reactions     No Known Allergies      BP Readings from Last 3 Encounters:   18 120/76   18 108/70   18 136/80    Wt Readings from Last 3 Encounters:   18 197 lb 3.2 oz (89.4 kg)   18 198 lb 9.6 oz (90.1 kg)   18 198 lb 6.4 oz  "(90 kg)                    ROS:  Constitutional, HEENT, cardiovascular, pulmonary, gi and gu systems are negative, except as otherwise noted.    OBJECTIVE:     /76 (Cuff Size: Adult Regular)  Pulse 76  Temp 98.7  F (37.1  C) (Temporal)  Resp 16  Ht 5' 11.5\" (1.816 m)  Wt 197 lb 3.2 oz (89.4 kg)  BMI 27.12 kg/m2  Body mass index is 27.12 kg/(m^2).  GENERAL: healthy, alert and no distress  MS: no gross musculoskeletal defects noted, no edema  SKIN: no suspicious lesions or rashes with exception of follicular rash to the bilateral axillary regions and proximally to the chest wall in this area.  He has had problems with this in his groin and thighs as well.  NEURO: Normal strength and tone, mentation intact and speech normal  PSYCH: mentation appears normal, affect normal/bright      ASSESSMENT/PLAN:     1. Rash  2. Follicular disorder  At this point in time I am uncertain of the etiology of this rash.  Having had the discussion with 1 of our MDs in the clinic and not having had any success with change in treatment we will send him to specialist for further evaluation and treatment options.    - predniSONE (DELTASONE) 20 MG tablet; Take 3 tabs (60 mg) by mouth daily x 3 days, 2 tabs (40 mg) daily x 3 days, 1 tab (20 mg) daily x 3 days, then 1/2 tab (10 mg) x 3 days.  Dispense: 20 tablet; Refill: 0  - DERMATOLOGY REFERRAL    He can follow-up with me as needed.  Benedict Vance PA-C  [facility]  "

## 2018-08-23 ENCOUNTER — OFFICE VISIT (OUTPATIENT)
Dept: FAMILY MEDICINE | Facility: OTHER | Age: 53
End: 2018-08-23
Payer: COMMERCIAL

## 2018-08-23 VITALS
SYSTOLIC BLOOD PRESSURE: 120 MMHG | HEIGHT: 72 IN | WEIGHT: 197.2 LBS | BODY MASS INDEX: 26.71 KG/M2 | TEMPERATURE: 98.7 F | DIASTOLIC BLOOD PRESSURE: 76 MMHG | HEART RATE: 76 BPM | RESPIRATION RATE: 16 BRPM

## 2018-08-23 DIAGNOSIS — L73.9 FOLLICULAR DISORDER: ICD-10-CM

## 2018-08-23 DIAGNOSIS — R21 RASH: Primary | ICD-10-CM

## 2018-08-23 PROCEDURE — 99214 OFFICE O/P EST MOD 30 MIN: CPT | Performed by: PHYSICIAN ASSISTANT

## 2018-08-23 RX ORDER — PREDNISONE 20 MG/1
TABLET ORAL
Qty: 20 TABLET | Refills: 0 | Status: SHIPPED | OUTPATIENT
Start: 2018-08-23 | End: 2018-12-06

## 2018-08-23 ASSESSMENT — PAIN SCALES - GENERAL: PAINLEVEL: NO PAIN (0)

## 2018-08-23 NOTE — MR AVS SNAPSHOT
After Visit Summary   8/23/2018    Rebel Garcia    MRN: [id number]           Patient Information     Date Of Birth          1965        Visit Information        Provider Department      8/23/2018 4:40 PM Benedict Flynn PA-C [facility] [place]        Today's Diagnoses     Rash    -  1    Follicular disorder           Follow-ups after your visit        Additional Services     DERMATOLOGY REFERRAL       Your provider has referred you to: FMG: [facility] [phone]   [email or web address]  UMP: [facility] [phone]   [email or web address]/  [facility] [phone]   [email or web address]  [facility] [employer] [phone]   [email or web address][email or web address]  [facility]  [place] [phone]   [email or web address]    Please be aware that coverage of these services is subject to the terms and limitations of your health insurance plan.  Call member services at your health plan with any benefit or coverage questions.      Please bring the following with you to your appointment:    (1) Any X-Rays, CTs or MRIs which have been performed.  Contact the facility where they were done to arrange for  prior to your scheduled appointment.    (2) List of current medications  (3) This referral request   (4) Any documents/labs given to you for this referral                  Who to contact     If you have questions or need follow up information about today's clinic visit or your schedule please contact [facility] directly at [phone].  Normal or non-critical lab and imaging results will be communicated to you by MyChart, letter or phone within 4 business days after the clinic has received the results. If you do not hear  "from us within 7 days, please contact the clinic through [employer] or phone. If you have a critical or abnormal lab result, we will notify you by phone as soon as possible.  Submit refill requests through [employer] or call your pharmacy and they will forward the refill request to us. Please allow 3 business days for your refill to be completed.          Additional Information About Your Visit        Care EveryWhere ID     This is your Care EveryWhere ID. This could be used by other organizations to access your [place] medical records  [id number]        Your Vitals Were     Pulse Temperature Respirations Height BMI (Body Mass Index)       76 98.7  F (37.1  C) (Temporal) 16 5' 11.5\" (1.816 m) 27.12 kg/m2        Blood Pressure from Last 3 Encounters:   08/23/18 120/76   08/06/18 108/70   07/30/18 136/80    Weight from Last 3 Encounters:   08/23/18 197 lb 3.2 oz (89.4 kg)   08/06/18 198 lb 9.6 oz (90.1 kg)   07/30/18 198 lb 6.4 oz (90 kg)              We Performed the Following     DERMATOLOGY REFERRAL          Today's Medication Changes          These changes are accurate as of 8/23/18  5:01 PM.  If you have any questions, ask your nurse or doctor.               Start taking these medicines.        Dose/Directions    predniSONE 20 MG tablet   Commonly known as:  DELTASONE   Used for:  Rash, Follicular disorder   Started by:  Benedict Flynn PA-C        Take 3 tabs (60 mg) by mouth daily x 3 days, 2 tabs (40 mg) daily x 3 days, 1 tab (20 mg) daily x 3 days, then 1/2 tab (10 mg) x 3 days.   Quantity:  20 tablet   Refills:  0            Where to get your medicines      These medications were sent to [facility] [address]   [address] [address]     Phone:  [phone]     predniSONE 20 MG tablet                Primary Care Provider Office Phone # Fax #    Marilu Gomez PA-C [phone] [address] [place] [address]        Equal Access " to Services     TERRY CANTU : Nikki Dietrich, wapeter frazieradaha, qamaytanna cadenaalbrenton taylor. So [facility] [place] [phone].    ATENCIÓN: Si habla español, tiene a pan disposición servicios gratuitos de asistencia lingüística. Llame al [phone].    We comply with applicable federal civil rights laws and [place] laws. We do not discriminate on the basis of race, color, national origin, age, disability, sex, sexual orientation, or gender identity.            Thank you!     Thank you for choosing [facility]  for your care. Our goal is always to provide you with excellent care. Hearing back from our patients is one way we can continue to improve our services. Please take a few minutes to complete the written survey that you may receive in the mail after your visit with us. Thank you!             Your Updated Medication List - Protect others around you: Learn how to safely use, store and throw away your medicines at www.disposemymeds.org.          This list is accurate as of 8/23/18  5:01 PM.  Always use your most recent med list.                   Brand Name Dispense Instructions for use Diagnosis    predniSONE 20 MG tablet    DELTASONE    20 tablet    Take 3 tabs (60 mg) by mouth daily x 3 days, 2 tabs (40 mg) daily x 3 days, 1 tab (20 mg) daily x 3 days, then 1/2 tab (10 mg) x 3 days.    Rash, Follicular disorder

## 2018-08-24 ENCOUNTER — TELEPHONE (OUTPATIENT)
Dept: FAMILY MEDICINE | Facility: OTHER | Age: 53
End: 2018-08-24

## 2018-08-24 NOTE — TELEPHONE ENCOUNTER
You placed a referral to dermatology to:   Your provider has referred you to: FMG: [facility] [phone] [email or web address]  UMP: [facility] [phone] http://www.[facility].org/[facility]/  [facility] [phone] [email or web address]://[email or web address]  [facility] [place] [facility] [employer] [phone] [email or web address]  [facility] [place] [phone] [email or web address]    His insurance gives him the best benefit when staying within the \[facility] [place] [facility]\"" network.  If he goes to one of the [employer] options he will have a higher out of pocket cost.  This is an insurance plane that does not accept insurance referrals when going out of network.     Please let me know if you have any questions.  Thanks!   Jayshree/clinic referrals

## 2018-09-10 ENCOUNTER — TELEPHONE (OUTPATIENT)
Dept: FAMILY MEDICINE | Facility: OTHER | Age: 53
End: 2018-09-10

## 2018-09-10 DIAGNOSIS — L73.9 FOLLICULITIS: Primary | ICD-10-CM

## 2018-09-10 NOTE — TELEPHONE ENCOUNTER
Reason for Call:  Same Day Appointment, Requested Provider:  BRIANNA May    PCP: Marilu Gomez    Reason for visit: neck pain, been to chiro 3x and not helping, rash f/u    Duration of symptoms: na    Have you been treated for this in the past? Yes    Additional comments: pt would like to be seen today and is open to time.  Please advise.  thanks    Can we leave a detailed message on this number? YES    Phone number patient can be reached at: Cell number on file:    Telephone Information:   Mobile [phone]       Best Time: any    Call taken on 9/10/2018 at 8:05 AM by Tanya Menchaca

## 2018-09-10 NOTE — TELEPHONE ENCOUNTER
Spoke with patient. Rash did improve with the prednisone.  Wondering if genaro would give him another round or if he should see derm.  Advised that if the prednisone didn't take it away completely should follow up with derm.  Would like referral placed. Pending.    As for neck pain, he is seeing occupation health.  Nadir Grajeda, RN, BSN

## 2018-09-10 NOTE — TELEPHONE ENCOUNTER
This is not a same-day appointment in my opinion.  I have seen him twice for the rash and told him last time that he needed to see by dermatology if our efforts did not help it improve.  The neck will take some x-rays and I would appreciate if we would have a different time slots in the very end of her day which is the only timeframe that I have available today.  Please help him schedule an appointment in the near future.  Electronically signed:    Benedict Vance PA-C

## 2018-09-24 ENCOUNTER — TELEPHONE (OUTPATIENT)
Dept: FAMILY MEDICINE | Facility: OTHER | Age: 53
End: 2018-09-24

## 2018-09-24 NOTE — LETTER
[facility]  [address] [place] [address]  Phone: [phone]        September 24, 2018      Rebel Garcia                                                                                                            [id number] [address] [facility] [address]    Dear Mr. Garcia,    We are concerned about your health care.  We received a notice that you are to be scheduled with a specialty clinic. The referral has been placed by your provider and you can call to schedule an appointment directly.     Enclosed, you will find the referral with the phone number to call to schedule an appointment.  If you have already scheduled this, you may disregard this letter.    Please call us if you have any questions or concerns.      Thank you,    Your [facility] Team

## 2018-09-24 NOTE — TELEPHONE ENCOUNTER
You placed a referral for patient to dermatology on 9/10/18.  Patient has not scheduled as of yet.      Please review and forward to team if follow up with the patient is needed.     Thank you!  Jayshree/Clinic Referrals Dyad II

## 2018-11-19 ENCOUNTER — TELEPHONE (OUTPATIENT)
Dept: DERMATOLOGY | Facility: CLINIC | Age: 53
End: 2018-11-19

## 2018-11-19 NOTE — TELEPHONE ENCOUNTER
Dermatology Pre-visit Call:    Reason for visit : Folliculitus     Any personal history of skin cancers: No    Any family history of skin cancers: Yes, brother     Was the patient referred: Yes    If the patient was referred, are records obtained: Yes.     Has the patient seen a dermatologist in the past: No. Records obtained: No    Patient Reminders Given:  --Please, make sure you bring an updated list of your medications, allergies and insurance information.  --Plan on being in our facility for approximately one hour, this includes the registration process, office visit, education and check-out process.  If you are having a procedure, more time may be required.     --We are located on the second floor of the building, check in desk D.   --If you need to cancel or reschedule, call [phone].  --We look forward to seeing you in Dermatology Clinic.

## 2018-11-21 ENCOUNTER — OFFICE VISIT (OUTPATIENT)
Dept: DERMATOLOGY | Facility: CLINIC | Age: 53
End: 2018-11-21
Payer: COMMERCIAL

## 2018-11-21 DIAGNOSIS — R21 RASH AND NONSPECIFIC SKIN ERUPTION: Primary | ICD-10-CM

## 2018-11-21 PROCEDURE — 99203 OFFICE O/P NEW LOW 30 MIN: CPT | Performed by: DERMATOLOGY

## 2018-11-21 ASSESSMENT — PAIN SCALES - GENERAL: PAINLEVEL: NO PAIN (0)

## 2018-11-21 NOTE — NURSING NOTE
Rebel Garcia's goals for this visit include:   Chief Complaint   Patient presents with     Derm Problem     Pt states bumps on both sides of abdomen. Pt states has been going on for about 6 months. Has tried prednisone, amoxicillin, and erythromycin. All prescribed by Primary Care. No personal hx of SC, but family hx of Unknown type.      He requests these members of his care team be copied on today's visit information:     PCP: Marilu Gomez    Referring Provider:  Marilu Gomez PA-C  [address] [place] [address]    There were no vitals taken for this visit.    Do you need any medication refills at today's visit? Arabella Cabrera LPN

## 2018-11-21 NOTE — PROGRESS NOTES
[facility] Dermatology Note      Dermatology Problem List:  1. Rash/nonspecific skin eruption: 2-3mm square shaped pink macules on lateral flanks and medial legs  -current tx: BPO wash, gentle skin care    Encounter Date: Nov 21, 2018    CC:  Chief Complaint   Patient presents with     Derm Problem     Pt states bumps on both sides of abdomen. Pt states has been going on for about 6 months. Has tried prednisone, amoxicillin, and erythromycin. All prescribed by Primary Care. No personal hx of SC, but family hx of Unknown type.          History of Present Illness:  Mr. Rebel Garcia is a 53 year old male who presents as a referral from Dr. Gomez for folliculitis. He has bumps on both sides of his abdomen and legs. They do not itch. It has been going on for 6 months. It appeared suddenly, all at once all over his flanks. He has tried treatment with keflex, bactrim, and prednisone prescribed primary care. Patient reports that he got a response with tapered course of prednisone. He notes that he did not see any changes while he was on prednisone, but soon after the spots started to disappear on their own. No personal history of skin cancer. Family history of skin cancer, unknown type. No other concerns addressed today.    Past Medical History:   Patient Active Problem List   Diagnosis     Pain in joint, lower leg     Encounter for other general counseling or advice on contraception     Encounter for sterilization     ESOPHAGEAL REFLUX     Anxiety     CARDIOVASCULAR SCREENING; LDL GOAL LESS THAN 160     Family history of colon cancer     Family history of prostate cancer     Attention deficit hyperactivity disorder (ADHD)     Past Medical History:   Diagnosis Date     Migraine, unspecified, without mention of intractable migraine without mention of status migrainosus      Sleep apnea      Past Surgical History:   Procedure Laterality Date     COLONOSCOPY  2/25/2000     COLONOSCOPY  02/16/10      COLONOSCOPY N/A 2015    Procedure: COMBINED COLONOSCOPY, SINGLE OR MULTIPLE BIOPSY/POLYPECTOMY BY BIOPSY;  Surgeon: Dima Sosa MD;  Location:  GI     HC CORRECT KIM BORREGO/DARNELL/JILLIAN  2005    Modified Darnell bunionectomy, right foot.     HC REPAIR UMBILICAL HARDY,5+Y/O, INCARCERATED OR STRANGULATED  6/15/2004     HERNIA REPAIR, INCISIONAL  2006    Incarcerated recurrent ventral hernia.     HERNIORRHAPHY UMBILICAL  3/29/2011    HERNIORRHAPHY UMBILICAL performed by ALOK WESLEY at  OR       Social History:  Patient  reports that he quit smoking about 12 years ago. His smoking use included Cigarettes. He has a 13.50 pack-year smoking history. He has never used smokeless tobacco. He reports that he drinks alcohol. He reports that he does not use illicit drugs. Patient works in management.     Family History:  Family History   Problem Relation Age of Onset     Allergies Father      iodine     HEART DISEASE Father      heart attack at 59 - CABG at 60     Cerebrovascular Disease Father      Cancer Mother      colon -  at age 56     Cancer Sister      cervix -  at age 38     Neurologic Disorder Sister      CNS bleed x 2 sisters     Prostate Cancer Brother 58     Other Cancer Brother 62     stage 4 liver cancer     Colon Cancer Sister 68       Medications:  Current Outpatient Prescriptions   Medication Sig Dispense Refill     predniSONE (DELTASONE) 20 MG tablet Take 3 tabs (60 mg) by mouth daily x 3 days, 2 tabs (40 mg) daily x 3 days, 1 tab (20 mg) daily x 3 days, then 1/2 tab (10 mg) x 3 days. (Patient not taking: Reported on 2018) 20 tablet 0       Allergies   Allergen Reactions     No Known Allergies      Review of Systems:  -Constitutional: Patient is otherwise feeling well, in usual state of health.   -Skin: As above in HPI. No additional skin concerns.    Physical exam:  Vitals: There were no vitals taken for this visit.  GEN: This is a well developed,  well-nourished male in no acute distress, in a pleasant mood.    SKIN: Focused examination of the face, abdomen, chest, back, buttock, upper extremities, lower extremities was performed.  -2-3 squared shaped pink papules with subtle overlying skin colored scale clustered together on the lateral flanks trailing down to the inner thighs and medial shins, in a very defined distribution.  -no similar lesions elsewhere  -No other lesions of concern on areas examined.       Impression/Plan:  1. Rash/eruption NOS: 2-3 mm square shaped pink papules with overlying skin colored scale in a very defined distribution that does not fit any specific dermatosis. This distribution does not fit with folliculitis which would be in the sebaceous areas on the skin (chest in a V and upper back in a V). 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 Differential includes grovers syndrome, pigmented purpura dermatosis, eczema. Patient notes spontaneous improvement of lesions after a course of prednisone. Offered biopsy vs empiric treatment for grovers and folliculitis. Patient wished to empirically today since lesions seem to be spontaneously resolving.     Recommended BPO wash daily for in the shower to decrease bacteria counts ont he skin.     Recommended daily cream based moisturizer (CeraVe) following showers.     Gentle skin care handout provided.     If rash recurs or fails to resolve, patient will call for an appointment.       Follow-up prn.       Staff Involved:  Scribe/Staff    Scribe Disclosure  I, Chanel Perez, am serving as a scribe to document services personally performed by Dr. Laina Potter MD, based on data collection and the provider's statements to me.     Provider Disclosure:   The documentation recorded by the scribe accurately reflects the services I personally performed and the decisions made  by me.    Laina Potter MD    Department of Dermatology  [facility]: Phone: [phone], Fax:[phone]  [facility]: Phone: [phone], Fax: [phone]

## 2018-11-21 NOTE — MR AVS SNAPSHOT
After Visit Summary   11/21/2018    Rebel Garcia    MRN: [id number]           Patient Information     Date Of Birth          1965        Visit Information        Provider Department      11/21/2018 10:00 AM Laina Potter MD [facility]        Care Instructions    Moisturize daily with a cream based moisturizer, like Vanicream or CeraVe.     Start over-the-counter benzoyl peroxide 10% wash in the shower as a body wash.  If 10% is too irritating you can use the 5%. (Clean&Clear makes this product. It is available here at the pharmacy or at [employer]). This medication can bleach your towels and clothing.     It is found in a purple tube in the acne aisle.             Gentle Skin Care  Below is a list of products our providers recommend for gentle skin care.  Moisturizers:    Lighter; Cetaphil Cream, CeraVe, Aveeno and Vanicream Light     Thicker; Aquaphor Ointment, Vaseline, Petrolium Jelly, Eucerin and Vanicream    Avoid Lotions (too thin)  Mild Cleansers:    Dove- Fragrance Free    CeraVe     Vanicream Cleansing Bar    Cetaphil Cleanser     Aquaphor 2 in1 Gentle Wash and Shampoo       Laundry Products:    All Free and Clear    Cheer Free    Generic Brands are okay as long as they are  Fragrance Free      Avoid fabric softeners  and dryer sheets   Sunscreens: SPF 30 or greater     Sunscreens that contain Zinc Oxide or Titanium Dioxide should be applied, these are physical blockers. Spray or  chemical  sunscreens should be avoided.        Shampoo and Conditioners:    Free and Clear by Vanicream    Aquaphor 2 in 1 Gentle Wash and Shampoo    California Baby  super sensitive   Oils:    Mineral Oil     Emu Oil     For some patients, coconut and sunflower seed oil      Generic Products are an okay substitute, but make sure they are fragrance free.  *Avoid product that have fragrance added to them. Organic does not mean  fragrance free.  In fact patients with sensitive skin can become  quite irritated by organic products.     1. Daily bathing is recommended. Make sure you are applying a good moisturizer after bathing every time.  2. Use Moisturizing creams at least twice daily to the whole body.   3. Creams are more moisturizing than lotions  4. Products should be fragrance free- soaps, creams, detergents.   Care Plan:  1. Keep bathing and showering short, less than 15 minutes   2. Always use lukewarm warm when possible. AVOID very HOT or COLD water  3. DO NOT use bubble bath  4. Limit the use of soaps. Focus on the skin folds, face, armpits, groin and feet  5. Do NOT vigorously scrub when you cleanse your skin  6. After bathing, PAT your skin lightly with a towel. DO NOT rub or scrub when drying  7. ALWAYS apply a moisturizer immediately after bathing. This helps to  lock in  the moisture.  8. Reapply moisturizing agents at least twice daily to your whole body  9. Do not use products such as powders, perfumes, or colognes on your skin  10. Avoid saunas and steam baths. This temperature is too HOT  11. Avoid tight or  scratchy  clothing such as wool  12. Always wash new clothing before wearing them for the first time  13. Sometimes a humidifier or vaporizer can be used at night can help the dry skin. Remember to keep it clean to avoid mold growth.                  Follow-ups after your visit        Who to contact     If you have questions or need follow up information about today's clinic visit or your schedule please contact [facility] directly at [phone].  Normal or non-critical lab and imaging results will be communicated to you by DFinehart, letter or phone within 4 business days after the clinic has received the results. If you do not hear from us within 7 days, please contact the clinic through [employer] or phone. If you have a critical or abnormal lab result, we will notify you by phone as soon as possible.  Submit refill requests through [employer] or call your pharmacy and  they will forward the refill request to us. Please allow 3 business days for your refill to be completed.          Additional Information About Your Visit        DiversityDoctorhart Information     [employer] is an electronic gateway that provides easy, online access to your medical records. With [employer], you can request a clinic appointment, read your test results, renew a prescription or communicate with your care team.     To sign up for [employer] visit the website at [email or web address]   You will be asked to enter the access code listed below, as well as some personal information. Please follow the directions to create your username and password.     Your access code is: [email or web address]  Expires: 2019 10:26 AM     Your access code will  in 90 days. If you need help or a new code, please contact your [facility] or call [phone] for assistance.        Care EveryWhere ID     This is your Care EveryWhere ID. This could be used by other organizations to access your [place] medical records  [id number]         Blood Pressure from Last 3 Encounters:   18 120/76   18 108/70   18 136/80    Weight from Last 3 Encounters:   18 89.4 kg (197 lb 3.2 oz)   18 90.1 kg (198 lb 9.6 oz)   18 90 kg (198 lb 6.4 oz)              Today, you had the following     No orders found for display       Primary Care Provider Office Phone # Fax #    Marilu Gomez PA-C [phone] [address] [place] [address]        Equal Access to Services     [facility]: Hadii aad ku hadasho Soomaali, waaxda luqadaha, qaybta kaalmada adeegyada, brenton mosquera haytomeka cabrales . So [facility] [phone].    ATENCIÓN: Si habla español, tiene a pan disposición servicios gratuitos de asistencia lingüística. Llame al [phone].    We comply with applicable federal civil rights laws and [place] laws. We do not discriminate on the basis of race, color,  national origin, age, disability, sex, sexual orientation, or gender identity.            Thank you!     Thank you for choosing [facility]  for your care. Our goal is always to provide you with excellent care. Hearing back from our patients is one way we can continue to improve our services. Please take a few minutes to complete the written survey that you may receive in the mail after your visit with us. Thank you!             Your Updated Medication List - Protect others around you: Learn how to safely use, store and throw away your medicines at www.disposemymeds.org.          This list is accurate as of 11/21/18 10:26 AM.  Always use your most recent med list.                   Brand Name Dispense Instructions for use Diagnosis    predniSONE 20 MG tablet    DELTASONE    20 tablet    Take 3 tabs (60 mg) by mouth daily x 3 days, 2 tabs (40 mg) daily x 3 days, 1 tab (20 mg) daily x 3 days, then 1/2 tab (10 mg) x 3 days.    Rash, Follicular disorder

## 2018-11-21 NOTE — PATIENT INSTRUCTIONS
Moisturize daily with a cream based moisturizer, like Vanicream or CeraVe.     Start over-the-counter benzoyl peroxide 10% wash in the shower as a body wash.  If 10% is too irritating you can use the 5%. (Clean&Clear makes this product. It is available here at the pharmacy or at [employer]). This medication can bleach your towels and clothing.     It is found in a purple tube in the acne aisle.             Gentle Skin Care  Below is a list of products our providers recommend for gentle skin care.  Moisturizers:    Lighter; Cetaphil Cream, CeraVe, Aveeno and Vanicream Light     Thicker; Aquaphor Ointment, Vaseline, Petrolium Jelly, Eucerin and Vanicream    Avoid Lotions (too thin)  Mild Cleansers:    Dove- Fragrance Free    CeraVe     Vanicream Cleansing Bar    Cetaphil Cleanser     Aquaphor 2 in1 Gentle Wash and Shampoo       Laundry Products:    All Free and Clear    Cheer Free    Generic Brands are okay as long as they are  Fragrance Free      Avoid fabric softeners  and dryer sheets   Sunscreens: SPF 30 or greater     Sunscreens that contain Zinc Oxide or Titanium Dioxide should be applied, these are physical blockers. Spray or  chemical  sunscreens should be avoided.        Shampoo and Conditioners:    Free and Clear by Vanicream    Aquaphor 2 in 1 Gentle Wash and Shampoo    California Baby  super sensitive   Oils:    Mineral Oil     Emu Oil     For some patients, coconut and sunflower seed oil      Generic Products are an okay substitute, but make sure they are fragrance free.  *Avoid product that have fragrance added to them. Organic does not mean  fragrance free.  In fact patients with sensitive skin can become quite irritated by organic products.     1. Daily bathing is recommended. Make sure you are applying a good moisturizer after bathing every time.  2. Use Moisturizing creams at least twice daily to the whole body.   3. Creams are more moisturizing than lotions  4. Products should be fragrance free- soaps,  creams, detergents.   Care Plan:  1. Keep bathing and showering short, less than 15 minutes   2. Always use lukewarm warm when possible. AVOID very HOT or COLD water  3. DO NOT use bubble bath  4. Limit the use of soaps. Focus on the skin folds, face, armpits, groin and feet  5. Do NOT vigorously scrub when you cleanse your skin  6. After bathing, PAT your skin lightly with a towel. DO NOT rub or scrub when drying  7. ALWAYS apply a moisturizer immediately after bathing. This helps to  lock in  the moisture.  8. Reapply moisturizing agents at least twice daily to your whole body  9. Do not use products such as powders, perfumes, or colognes on your skin  10. Avoid saunas and steam baths. This temperature is too HOT  11. Avoid tight or  scratchy  clothing such as wool  12. Always wash new clothing before wearing them for the first time  13. Sometimes a humidifier or vaporizer can be used at night can help the dry skin. Remember to keep it clean to avoid mold growth.

## 2018-11-21 NOTE — LETTER
11/21/2018         RE: Rebel Garcia  [id number]  [facility] [address]        Dear Colleague,    Thank you for referring your patient, Rebel Garcia, to the [facility]. Please see a copy of my visit note below.    [facility] Dermatology Note      Dermatology Problem List:  1. Rash/nonspecific skin eruption: 2-3mm square shaped pink macules on lateral flanks and medial legs  -current tx: BPO wash, gentle skin care    Encounter Date: Nov 21, 2018    CC:  Chief Complaint   Patient presents with     Derm Problem     Pt states bumps on both sides of abdomen. Pt states has been going on for about 6 months. Has tried prednisone, amoxicillin, and erythromycin. All prescribed by Primary Care. No personal hx of SC, but family hx of Unknown type.          History of Present Illness:  Mr. Rebel Garcia is a 53 year old male who presents as a referral from Dr. Gomez for folliculitis. He has bumps on both sides of his abdomen and legs. They do not itch. It has been going on for 6 months. It appeared suddenly, all at once all over his flanks. He has tried treatment with keflex, bactrim, and prednisone prescribed primary care. Patient reports that he got a response with tapered course of prednisone. He notes that he did not see any changes while he was on prednisone, but soon after the spots started to disappear on their own. No personal history of skin cancer. Family history of skin cancer, unknown type. No other concerns addressed today.    Past Medical History:   Patient Active Problem List   Diagnosis     Pain in joint, lower leg     Encounter for other general counseling or advice on contraception     Encounter for sterilization     ESOPHAGEAL REFLUX     Anxiety     CARDIOVASCULAR SCREENING; LDL GOAL LESS THAN 160     Family history of colon cancer     Family history of prostate cancer     Attention deficit hyperactivity disorder (ADHD)     Past Medical History:    Diagnosis Date     Migraine, unspecified, without mention of intractable migraine without mention of status migrainosus      Sleep apnea      Past Surgical History:   Procedure Laterality Date     COLONOSCOPY  2000     COLONOSCOPY  02/16/10     COLONOSCOPY N/A 2015    Procedure: COMBINED COLONOSCOPY, SINGLE OR MULTIPLE BIOPSY/POLYPECTOMY BY BIOPSY;  Surgeon: Dima Sosa MD;  Location:  GI     HC CORRECT BUNION,ALVAREZ/DARNELL/WALSH  2005    Modified Cruz bunionectomy, right foot.     HC REPAIR UMBILICAL HARDY,5+Y/O, INCARCERATED OR STRANGULATED  6/15/2004     HERNIA REPAIR, INCISIONAL  2006    Incarcerated recurrent ventral hernia.     HERNIORRHAPHY UMBILICAL  3/29/2011    HERNIORRHAPHY UMBILICAL performed by ALOK WESLEY at  OR       Social History:  Patient  reports that he quit smoking about 12 years ago. His smoking use included Cigarettes. He has a 13.50 pack-year smoking history. He has never used smokeless tobacco. He reports that he drinks alcohol. He reports that he does not use illicit drugs. Patient works in management.     Family History:  Family History   Problem Relation Age of Onset     Allergies Father      iodine     HEART DISEASE Father      heart attack at 59 - CABG at 60     Cerebrovascular Disease Father      Cancer Mother      colon -  at age 56     Cancer Sister      cervix -  at age 38     Neurologic Disorder Sister      CNS bleed x 2 sisters     Prostate Cancer Brother 58     Other Cancer Brother 62     stage 4 liver cancer     Colon Cancer Sister 68       Medications:  Current Outpatient Prescriptions   Medication Sig Dispense Refill     predniSONE (DELTASONE) 20 MG tablet Take 3 tabs (60 mg) by mouth daily x 3 days, 2 tabs (40 mg) daily x 3 days, 1 tab (20 mg) daily x 3 days, then 1/2 tab (10 mg) x 3 days. (Patient not taking: Reported on 2018) 20 tablet 0       Allergies   Allergen Reactions     No Known Allergies       Review of Systems:  -Constitutional: Patient is otherwise feeling well, in usual state of health.   -Skin: As above in HPI. No additional skin concerns.    Physical exam:  Vitals: There were no vitals taken for this visit.  GEN: This is a well developed, well-nourished male in no acute distress, in a pleasant mood.    SKIN: Focused examination of the face, abdomen, chest, back, buttock, upper extremities, lower extremities was performed.  -2-3 squared shaped pink papules with subtle overlying skin colored scale clustered together on the lateral flanks trailing down to the inner thighs and medial shins, in a very defined distribution.  -no similar lesions elsewhere  -No other lesions of concern on areas examined.       Impression/Plan:  1. Rash/eruption NOS: 2-3 mm square shaped pink papules with overlying skin colored scale in a very defined distribution that does not fit any specific dermatosis. This distribution does not fit with folliculitis which would be in the sebaceous areas on the skin (chest in a V and upper back in a V). 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 Differential includes grovers syndrome, pigmented purpura dermatosis, eczema. Patient notes spontaneous improvement of lesions after a course of prednisone. Offered biopsy vs empiric treatment for grovers and folliculitis. Patient wished to empirically today since lesions seem to be spontaneously resolving.     Recommended BPO wash daily for in the shower to decrease bacteria counts ont he skin.     Recommended daily cream based moisturizer (CeraVe) following showers.     Gentle skin care handout provided.     If rash recurs or fails to resolve, patient will call for an appointment.       Follow-up prn.       Staff Involved:  Scribe/Staff    Scribe Disclosure  I, Chanel Perez, am serving as a scribe to document services  personally performed by Dr. Laina Potter MD, based on data collection and the provider's statements to me.     Provider Disclosure:   The documentation recorded by the scribe accurately reflects the services I personally performed and the decisions made by me.    Laina Potter MD    Department of Dermatology  [facility]: Phone: [phone], Fax:[phone]  [facility]: Phone: [phone], Fax: [phone]              Again, thank you for allowing me to participate in the care of your patient.        Sincerely,        Laina Potter MD

## 2018-12-06 ENCOUNTER — OFFICE VISIT (OUTPATIENT)
Dept: FAMILY MEDICINE | Facility: OTHER | Age: 53
End: 2018-12-06
Payer: COMMERCIAL

## 2018-12-06 ENCOUNTER — TELEPHONE (OUTPATIENT)
Dept: FAMILY MEDICINE | Facility: OTHER | Age: 53
End: 2018-12-06

## 2018-12-06 VITALS
WEIGHT: 199.4 LBS | HEART RATE: 60 BPM | SYSTOLIC BLOOD PRESSURE: 130 MMHG | HEIGHT: 72 IN | BODY MASS INDEX: 27.01 KG/M2 | DIASTOLIC BLOOD PRESSURE: 84 MMHG | TEMPERATURE: 97.7 F | RESPIRATION RATE: 18 BRPM

## 2018-12-06 DIAGNOSIS — R42 DIZZINESS: Primary | ICD-10-CM

## 2018-12-06 DIAGNOSIS — K21.9 GASTROESOPHAGEAL REFLUX DISEASE WITHOUT ESOPHAGITIS: ICD-10-CM

## 2018-12-06 DIAGNOSIS — J02.9 ACUTE PHARYNGITIS, UNSPECIFIED ETIOLOGY: ICD-10-CM

## 2018-12-06 DIAGNOSIS — H81.13 BENIGN PAROXYSMAL POSITIONAL VERTIGO, BILATERAL: ICD-10-CM

## 2018-12-06 DIAGNOSIS — F41.9 ANXIETY: ICD-10-CM

## 2018-12-06 DIAGNOSIS — E78.5 HYPERLIPIDEMIA LDL GOAL <130: ICD-10-CM

## 2018-12-06 DIAGNOSIS — R19.7 DIARRHEA, UNSPECIFIED TYPE: ICD-10-CM

## 2018-12-06 PROCEDURE — 99213 OFFICE O/P EST LOW 20 MIN: CPT | Performed by: PHYSICIAN ASSISTANT

## 2018-12-06 RX ORDER — ESCITALOPRAM OXALATE 10 MG/1
5 TABLET ORAL DAILY
Qty: 45 TABLET | Refills: 1 | Status: SHIPPED | OUTPATIENT
Start: 2018-12-06 | End: 2019-03-11 | Stop reason: DRUGHIGH

## 2018-12-06 ASSESSMENT — PAIN SCALES - GENERAL: PAINLEVEL: NO PAIN (0)
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
## 2018-12-06 NOTE — MR AVS SNAPSHOT
"              After Visit Summary   12/6/2018    Rebel Garcia    MRN: [id number]           Patient Information     Date Of Birth          1965        Visit Information        Provider Department      12/6/2018 2:40 PM Benedict Flynn PA-C [facility]        Today's Diagnoses     Dizziness    -  1    Benign paroxysmal positional vertigo, bilateral        Acute pharyngitis, unspecified etiology        Hyperlipidemia LDL goal <130        Diarrhea, unspecified type        Anxiety        Gastroesophageal reflux disease without esophagitis           Follow-ups after your visit        Follow-up notes from your care team     Return in about 4 weeks (around 1/3/2019) for Medication Recheck.      Who to contact     If you have questions or need follow up information about today's clinic visit or your schedule please contact [facility] directly at [phone].  Normal or non-critical lab and imaging results will be communicated to you by MyChart, letter or phone within 4 business days after the clinic has received the results. If you do not hear from us within 7 days, please contact the clinic through MyChart or phone. If you have a critical or abnormal lab result, we will notify you by phone as soon as possible.  Submit refill requests through [email or web address] or call your pharmacy and they will forward the refill request to us. Please allow 3 business days for your refill to be completed.          Additional Information About Your Visit        MyChart Information     [email or web address] lets you send messages to your doctor, view your test results, renew your prescriptions, schedule appointments and more. To sign up, go to [email or web address] . Click on \"Log in\" on the left side of the screen, which will take you to the Welcome page. Then click on \"Sign up Now\" on the right side of the page.     You will be asked to enter the access code listed below, as well as some personal information. Please follow " "the directions to create your username and password.     Your access code is: [email or web address]  Expires: 2019 10:26 AM     Your access code will  in 90 days. If you need help or a new code, please call your [place] clinic or [phone].        Care EveryWhere ID     This is your Care EveryWhere ID. This could be used by other organizations to access your [place] medical records  [id number]        Your Vitals Were     Pulse Temperature Respirations Height BMI (Body Mass Index)       60 97.7  F (36.5  C) (Temporal) 18 5' 11.5\" (1.816 m) 27.42 kg/m2        Blood Pressure from Last 3 Encounters:   18 130/84   18 120/76   18 108/70    Weight from Last 3 Encounters:   18 199 lb 6.4 oz (90.4 kg)   18 197 lb 3.2 oz (89.4 kg)   18 198 lb 9.6 oz (90.1 kg)              Today, you had the following     No orders found for display         Today's Medication Changes          These changes are accurate as of 18  3:10 PM.  If you have any questions, ask your nurse or doctor.               Start taking these medicines.        Dose/Directions    escitalopram 10 MG tablet   Commonly known as:  LEXAPRO   Used for:  Anxiety   Started by:  Benedict Flynn PA-C        Dose:  5 mg   Take 0.5 tablets (5 mg) by mouth daily   Quantity:  45 tablet   Refills:  1            Where to get your medicines      These medications were sent to [facility] [place] [facility] - [address]   [address] [place] [address]     Phone:  [phone]     escitalopram 10 MG tablet                Primary Care Provider Office Phone # Fax #    Marilu Gomez PA-C [phone] [address] [place] [address]        Equal Access to Services     [facility] : Nikki kate Sobhupinder, waaxda luqadaha, qaybta kaalmada jonelleyaluis, brenton cabrales . So [facility] [phone].    ATENCIÓN: Si habla español, tiene a pan disposición servicios " palma de asistencia lingüística. Mar andrade [phone].    We comply with applicable federal civil rights laws and [place] laws. We do not discriminate on the basis of race, color, national origin, age, disability, sex, sexual orientation, or gender identity.            Thank you!     Thank you for choosing [facility]  for your care. Our goal is always to provide you with excellent care. Hearing back from our patients is one way we can continue to improve our services. Please take a few minutes to complete the written survey that you may receive in the mail after your visit with us. Thank you!             Your Updated Medication List - Protect others around you: Learn how to safely use, store and throw away your medicines at www.disposemymeds.org.          This list is accurate as of 12/6/18  3:10 PM.  Always use your most recent med list.                   Brand Name Dispense Instructions for use Diagnosis    escitalopram 10 MG tablet    LEXAPRO    45 tablet    Take 0.5 tablets (5 mg) by mouth daily    Anxiety

## 2018-12-06 NOTE — TELEPHONE ENCOUNTER
Spoke with patient.  Over that last few days have noticed some dizziness.  Walking around was dizzy yesterday.  Didn't last long. Lightheaded and needed to sit down.  Needed to focus on something then was better. Today happened again while sitting in the car.  Has some anxiety that he would also like to talk about.    No hx of HTN or DM.     Needs to be seen for further evaluation    RECOMMENDED DISPOSITION:  See in 24 hours -   Will comply with recommendation: yes   If further questions/concerns or if Sx do not improve, worsen or new Sx develop, call your PCP or [facility] as soon as possible.    NOTES:  Disposition was determined by the first positive assessment question, therefore all previous assessment questions were negative.     Guideline used:  Telephone Triage Protocols for Nurses, Fifth Edition, Елена Grajeda, RN, BSN

## 2019-02-27 DIAGNOSIS — F41.9 ANXIETY: ICD-10-CM

## 2019-02-27 RX ORDER — ESCITALOPRAM OXALATE 10 MG/1
5 TABLET ORAL DAILY
Qty: 45 TABLET | Refills: 1 | Status: CANCELLED | OUTPATIENT
Start: 2019-02-27

## 2019-02-27 NOTE — TELEPHONE ENCOUNTER
Reason for Call:  Medication or medication refill:    Do you use a [facility]?  Name of the pharmacy and phone number for the current request:  [facility] - [phone]    Name of the medication requested: lexapro    Other request: none    Can we leave a detailed message on this number? YES    Phone number patient can be reached at: Cell number on file:    Telephone Information:   Mobile [phone]       Best Time: any    Call taken on 2/27/2019 at 12:33 PM by Tanya Menchaca

## 2019-02-28 NOTE — TELEPHONE ENCOUNTER
"Requested Prescriptions   Pending Prescriptions Disp Refills     escitalopram (LEXAPRO) 10 MG tablet 45 tablet 1     Sig: Take 0.5 tablets (5 mg) by mouth daily    SSRIs Protocol Passed - 2/27/2019 12:44 PM       Passed - Recent (12 mo) or future (30 days) visit within the authorizing provider's specialty    Patient had office visit in the last 12 months or has a visit in the next 30 days with authorizing provider or within the authorizing provider's specialty.  See \"Patient Info\" tab in inbasket, or \"Choose Columns\" in Meds & Orders section of the refill encounter.         Passed - Medication is active on med list       Passed - Patient is age 18 or older        LUIS-7 SCORE 5/30/2012 1/31/2013 8/7/2013   Total Score 4 4 5     PHQ-9 SCORE 8/25/2010 3/28/2012 1/31/2013   PHQ-9 Total Score 4 4 3   escitalopram (LEXAPRO) 10 MG tablet  Rx was sent 12/06/2018 for 45 tabs and 1 refills.   Pharmacy notified via E-prescribe refusal.  Domonique Callejas, RN, BSN       "

## 2019-03-11 ENCOUNTER — E-VISIT (OUTPATIENT)
Dept: FAMILY MEDICINE | Facility: OTHER | Age: 54
End: 2019-03-11
Payer: COMMERCIAL

## 2019-03-11 ENCOUNTER — TELEPHONE (OUTPATIENT)
Dept: FAMILY MEDICINE | Facility: OTHER | Age: 54
End: 2019-03-11

## 2019-03-11 DIAGNOSIS — F41.9 ANXIETY: ICD-10-CM

## 2019-03-11 PROCEDURE — 99444 ZZC PHYSICIAN ONLINE EVALUATION & MANAGEMENT SERVICE: CPT | Performed by: PHYSICIAN ASSISTANT

## 2019-03-11 RX ORDER — ESCITALOPRAM OXALATE 20 MG/1
20 TABLET ORAL DAILY
Qty: 30 TABLET | Refills: 5 | Status: SHIPPED | OUTPATIENT
Start: 2019-03-11 | End: 2019-09-24

## 2019-03-11 ASSESSMENT — ANXIETY QUESTIONNAIRES
3. WORRYING TOO MUCH ABOUT DIFFERENT THINGS: NOT AT ALL
6. BECOMING EASILY ANNOYED OR IRRITABLE: SEVERAL DAYS
2. NOT BEING ABLE TO STOP OR CONTROL WORRYING: NOT AT ALL
GAD7 TOTAL SCORE: 2
4. TROUBLE RELAXING: NOT AT ALL
GAD7 TOTAL SCORE: 2
1. FEELING NERVOUS, ANXIOUS, OR ON EDGE: SEVERAL DAYS
7. FEELING AFRAID AS IF SOMETHING AWFUL MIGHT HAPPEN: NOT AT ALL
7. FEELING AFRAID AS IF SOMETHING AWFUL MIGHT HAPPEN: NOT AT ALL
5. BEING SO RESTLESS THAT IT IS HARD TO SIT STILL: NOT AT ALL

## 2019-03-11 NOTE — TELEPHONE ENCOUNTER
Reason for Call:  Medication or medication refill:    Do you use a [facility]?  Name of the pharmacy and phone number for the current request:  [facility] - [phone]    Name of the medication requested: escitalopram (LEXAPRO) 20MG tablet    Other request: pt wife states pt needs refill of medication and was supposed to do 30 day trial which pt did and increased dosage gradually to 20MG . Pt needing refill of 20MG tablets.     Can we leave a detailed message on this number? YES    Phone number patient can be reached at: Cell number on file:    Telephone Information:   Mobile [phone]       Best Time: ANY      Call taken on 3/11/2019 at 8:38 AM by Yahaira Caro

## 2019-03-11 NOTE — TELEPHONE ENCOUNTER
Spoke with wife and pt, gave information below. Wife stated they will call the [employer] number to figure out why he is not able to sign up from home when he has tried. If able to get him activated this way will do evisit for this(ok per [place] PFA and SSA). If not able to do this will do phone visit. Will flag FYI for provider.    Gabby Najera CMA ([facility])

## 2019-03-11 NOTE — TELEPHONE ENCOUNTER
It appears per Benedict's note that follow up in 4-6 weeks is advised.   He should do some type of visit for his refill  Marilu Gomez PA-C

## 2019-03-12 ASSESSMENT — ANXIETY QUESTIONNAIRES: GAD7 TOTAL SCORE: 2

## 2019-07-02 ENCOUNTER — OFFICE VISIT (OUTPATIENT)
Dept: FAMILY MEDICINE | Facility: OTHER | Age: 54
End: 2019-07-02
Payer: COMMERCIAL

## 2019-07-02 VITALS
DIASTOLIC BLOOD PRESSURE: 82 MMHG | RESPIRATION RATE: 12 BRPM | BODY MASS INDEX: 28.21 KG/M2 | WEIGHT: 201.5 LBS | HEIGHT: 71 IN | SYSTOLIC BLOOD PRESSURE: 130 MMHG | HEART RATE: 68 BPM | TEMPERATURE: 97.9 F

## 2019-07-02 DIAGNOSIS — M54.50 ACUTE RIGHT-SIDED LOW BACK PAIN WITHOUT SCIATICA: Primary | ICD-10-CM

## 2019-07-02 LAB
ALBUMIN UR-MCNC: NEGATIVE MG/DL
APPEARANCE UR: CLEAR
BILIRUB UR QL STRIP: NEGATIVE
COLOR UR AUTO: YELLOW
GLUCOSE UR STRIP-MCNC: NEGATIVE MG/DL
HGB UR QL STRIP: ABNORMAL
KETONES UR STRIP-MCNC: NEGATIVE MG/DL
LEUKOCYTE ESTERASE UR QL STRIP: NEGATIVE
NITRATE UR QL: NEGATIVE
PH UR STRIP: 5.5 PH (ref 5–7)
RBC #/AREA URNS AUTO: NORMAL /HPF
SOURCE: ABNORMAL
SP GR UR STRIP: >1.03 (ref 1–1.03)
UROBILINOGEN UR STRIP-ACNC: 0.2 EU/DL (ref 0.2–1)
WBC #/AREA URNS AUTO: NORMAL /HPF

## 2019-07-02 PROCEDURE — 99213 OFFICE O/P EST LOW 20 MIN: CPT | Performed by: STUDENT IN AN ORGANIZED HEALTH CARE EDUCATION/TRAINING PROGRAM

## 2019-07-02 PROCEDURE — 81001 URINALYSIS AUTO W/SCOPE: CPT | Performed by: STUDENT IN AN ORGANIZED HEALTH CARE EDUCATION/TRAINING PROGRAM

## 2019-07-02 ASSESSMENT — PAIN SCALES - GENERAL: PAINLEVEL: MODERATE PAIN (4)

## 2019-07-02 ASSESSMENT — MIFFLIN-ST. JEOR: SCORE: 1785.25

## 2019-07-02 NOTE — PROGRESS NOTES
Subjective     Rebel Garcia is a 53 year old male who presents to clinic today for the following health issues:    HPI       FLANK   PAIN     Onset: 3 days    Description:   Character: Dull ache  Location: right flank  Radiation: None    Intensity: 4/10    Progression of Symptoms:  improving    Accompanying Signs & Symptoms:  Fever/Chills?: no   Gas/Bloating: no   Nausea: no   Vomitting: no   Diarrhea?: no   Constipation:no   Dysuria or Hematuria: no    History:   Trauma: no   Previous similar pain: no    Previous tests done: none    Precipitating factors:   Does the pain change with:     Food: no      BM: no     Urination: YES- feels better    Alleviating factors:  Cranberry pills, ibuprofen, increase fluid intake.    Therapies Tried and outcome: Cranberry pills, ibuprofen, increasing fluid intake    LMP:  not applicable       Genitourinary - Male  Onset: 3 days    Description:   Dysuria (painful urination): no   Hematuria (blood in urine): no   Frequency: no   Are you urinating at night : no   Hesitancy (delay in urine): YES- sometimes  Retention (unable to empty): no   Decrease in urinary flow: no   Incontinence: no     Progression of Symptoms:  improving    Accompanying Signs & Symptoms:  Fever: no   Back/Flank pain: YES  Urethral discharge: no   Testicle lumps/masses/pain: no   Nausea and/or vomiting: no   Abdominal pain: no     History:   History of frequent UTI's: no   History of kidney stones: no   History of hernias: YES- umbilical hernia  Personal or Family history of Prostate problems: YES- Brother had prostate cancer  Sexually active: YES    Precipitating factors:   None    Alleviating factors:  Cranberry pills    He was doing some heavy lifting last week building a pole building on Wednesday.  He noticed the pain on Saturday.  The pain is worsened by lying on his right side and improved by laying on his left side.    Patient Active Problem List   Diagnosis     Pain in joint, lower leg     Encounter  for other general counseling or advice on contraception     Encounter for sterilization     Anxiety     Family history of colon cancer     Family history of prostate cancer     Attention deficit hyperactivity disorder (ADHD)     Hyperlipidemia LDL goal <130     Gastroesophageal reflux disease without esophagitis     Past Surgical History:   Procedure Laterality Date     COLONOSCOPY  2000     COLONOSCOPY  02/16/10     COLONOSCOPY N/A 2015    Procedure: COMBINED COLONOSCOPY, SINGLE OR MULTIPLE BIOPSY/POLYPECTOMY BY BIOPSY;  Surgeon: Dima Sosa MD;  Location:  GI     HC CORRECT KIM BORREGO/DARNELL/JILLIAN  2005    Modified Darnell bunionectomy, right foot.     HC REPAIR UMBILICAL HARDY,5+Y/O, INCARCERATED OR STRANGULATED  6/15/2004     HERNIA REPAIR, INCISIONAL  2006    Incarcerated recurrent ventral hernia.     HERNIORRHAPHY UMBILICAL  3/29/2011    HERNIORRHAPHY UMBILICAL performed by ALOK WESLEY at  OR       Social History     Tobacco Use     Smoking status: Former Smoker     Packs/day: 0.75     Years: 18.00     Pack years: 13.50     Types: Cigarettes     Last attempt to quit: 2006     Years since quittin.3     Smokeless tobacco: Never Used   Substance Use Topics     Alcohol use: Yes     Comment: occ     Family History   Problem Relation Age of Onset     Allergies Father         iodine     Heart Disease Father         heart attack at 59 - CABG at 60     Cerebrovascular Disease Father      Cancer Mother         colon -  at age 56     Cancer Sister         cervix -  at age 38     Neurologic Disorder Sister         CNS bleed x 2 sisters     Prostate Cancer Brother 58     Other Cancer Brother 62        stage 4 liver cancer     Colon Cancer Sister 68         Current Outpatient Medications   Medication Sig Dispense Refill     escitalopram (LEXAPRO) 20 MG tablet Take 1 tablet (20 mg) by mouth daily 30 tablet 5     Allergies   Allergen Reactions     No  "Known Allergies      BP Readings from Last 3 Encounters:   07/02/19 130/82   12/06/18 130/84   08/23/18 120/76    Wt Readings from Last 3 Encounters:   07/02/19 91.4 kg (201 lb 8 oz)   12/06/18 90.4 kg (199 lb 6.4 oz)   08/23/18 89.4 kg (197 lb 3.2 oz)                    Reviewed and updated as needed this visit by Provider         Review of Systems   ROS COMP: Constitutional, HEENT, cardiovascular, pulmonary, gi and gu systems are negative, except as otherwise noted.      Objective    /82   Pulse 68   Temp 97.9  F (36.6  C) (Temporal)   Resp 12   Ht 1.81 m (5' 11.26\")   Wt 91.4 kg (201 lb 8 oz)   BMI 27.90 kg/m    Body mass index is 27.9 kg/m .  Physical Exam   GENERAL: alert and no acute distress  RESP: lungs clear to auscultation - no rales, rhonchi or wheezes  CV: regular rate and rhythm, normal S1 S2, no S3 or S4, no murmur, click or rub  MS: no gross musculoskeletal defects noted, no edema  SKIN: no suspicious lesions or rashes  BACK: no CVA tenderness, no paralumbar tenderness  PSYCH: mentation appears normal, affect normal/bright    Diagnostic Test Results:  Labs reviewed in Epic  Results for orders placed or performed in visit on 07/02/19 (from the past 24 hour(s))   *UA reflex to Microscopic and Culture ([place] [facility] (except [place] and [place])   Result Value Ref Range    Color Urine Yellow     Appearance Urine Clear     Glucose Urine Negative NEG^Negative mg/dL    Bilirubin Urine Negative NEG^Negative    Ketones Urine Negative NEG^Negative mg/dL    Specific Gravity Urine >1.030 1.003 - 1.035    Blood Urine Trace (A) NEG^Negative    pH Urine 5.5 5.0 - 7.0 pH    Protein Albumin Urine Negative NEG^Negative mg/dL    Urobilinogen Urine 0.2 0.2 - 1.0 EU/dL    Nitrite Urine Negative NEG^Negative    Leukocyte Esterase Urine Negative NEG^Negative    Source Midstream Urine    Urine Microscopic   Result Value Ref Range    WBC Urine 0 - 5 OTO5^0 - 5 /HPF    RBC Urine O - 2 OTO2^O - 2 " "/HPF           Assessment & Plan     1. Acute right-sided low back pain without sciatica  His physical exam suggests musculoskeletal etiology given tenderness to palpation of the muscles in the area of concern and pain reproducible with certain positions. UA normal.  Recommend NSAIDs twice daily for 10 days, ice or heat, rest, avoiding heavy lifting for 2 weeks and time.  Patient declined muscle relaxer.  If the pain worsens or persists I recommend that he is seen again in clinic.  - *UA reflex to Microscopic and Culture ([place] and [facility] (except [place] and [place])  - Urine Microscopic      BMI:   Estimated body mass index is 27.9 kg/m  as calculated from the following:    Height as of this encounter: 1.81 m (5' 11.26\").    Weight as of this encounter: 91.4 kg (201 lb 8 oz).   Weight management plan: Discussed healthy diet and exercise guidelines    No follow-ups on file.    NIEVES Greco [facility]    "

## 2019-07-22 ENCOUNTER — NURSE TRIAGE (OUTPATIENT)
Dept: FAMILY MEDICINE | Facility: OTHER | Age: 54
End: 2019-07-22

## 2019-07-22 NOTE — PROGRESS NOTES
"Subjective     Rebel Garcia is a 53 year old male who presents to clinic today for the following health issues:    History of Present Illness        Migraines:   Since the patient's last clinic visit, headaches are: worsened  The patient is getting headaches:  Daily  He is not able to do normal daily activities when he has a migraine.  The patient is taking the following rescue/relief medications:  Ibuprofen (Advil, Motrin)   Patient states \"I get some relief\" from the rescue/relief medications.   The patient is taking the following medications to prevent migraines:  No medications to prevent migraines  In the past 4 weeks, the patient has gone to an Urgent Care or Emergency Room 0 times times due to headaches.    He eats 0-1 servings of fruits and vegetables daily.He consumes 0 sweetened beverage(s) daily.  He is taking medications regularly.     Eye(s) Problem  Onset: yesterday, one day     Description:   Location: bilateral  Pain: no  Redness: no    Accompanying Signs & Symptoms:  Discharge/mattering: no  Swelling: no  Visual changes: YES- blurry/fuzzy feeling  Fever: no  Nasal Congestion: no  Bothered by bright lights: no  Headaches: Yes-had seen optometry yesterday but said everything fine. Ongoing when eyes became fuzzy.   Dizziness: Yes, comes and goes whenever it wants-has been ongoing for a week.   Chest pain: when it takes a deep breath it feels like he broke a rib, left sided chest    History:   Trauma: no   Foreign body exposure: no    Precipitating factors:   Wearing contacts: no    Alleviating factors:  Improved by: none    Therapies Tried and outcome: ibuprofen-\"keeps it at bay\"    - Yesterday when he woke up he noticed his vision was very blurry. It persisted but let up a little bit.  He went to work but then after straining because of the difficulty with focusing eyes and blurry vision he developed a headache that was behind the head and says it wasn't really a headache it was just pain from " straining the eyes.  [facility] and they said he was fine.  After obtaining visual acuity testing he noted that they said he needed prescription glasses so he has ordered them (he has had lasix in the past).    - Today his vision is still blurry but improved compared to what it was yesterday.  No headache or pain in the head today.   - Yesterday he was feeling dizzy which he notes is feeling lightheaded.  It happened once yesterday, nothing so far today.   - He has also been more tired in the last week, he notices when driving home from work he could fall asleep.  He does have sleep apnea but doesn't tolerate the mask for his CPAP machine so doesn't use one.  He denies waking up with headaches.   - He has a history of migraines but has one every 6 months or so, located front of the head both sides, lasts for approximately 4 hours or so, uses OTC therapies to resolve.      - He has also dealt with some left sided chest pain that comes and goes, thinks maybe it is a muscle issue but movements does not make it worse, he can't touch the pain.   - He also notes for a while he has had intermittent episodes of feeling like he can't take a deep enough breath but denies associated fevers, chills, cough, hemoptysis.     - Denies recent illness  - No recent head trauma.  - Has significant family history with cardiovascular and neurologic disorders.   - he denies fevers, chills, rhinorrhea, congestion, sore throat, cough, hemoptysis, abdominal pain, nausea, vomiting, diarrhea, constipation, paresthesias.     VISION   No corrective lenses  Tool used: Azam   Right eye:        10/20 (20/40)  Left eye:          10/25 (20/50)  Both eyes:  20/32      Current Outpatient Medications   Medication Sig Dispense Refill     escitalopram (LEXAPRO) 20 MG tablet Take 1 tablet (20 mg) by mouth daily 30 tablet 5     Allergies   Allergen Reactions     No Known Allergies        Reviewed and updated as needed  "this visit by Provider  Tobacco  Allergies  Meds  Problems  Med Hx  Surg Hx  Fam Hx         Review of Systems   ROS COMP: Constitutional, HEENT, cardiovascular, pulmonary, GI, , musculoskeletal, neuro, skin, endocrine and psych systems are negative, except as otherwise noted.      Objective    /84   Pulse 62   Temp 98.2  F (36.8  C) (Temporal)   Resp 16   Ht 1.8 m (5' 10.87\")   Wt 91.6 kg (202 lb)   SpO2 97%   BMI 28.28 kg/m    Body mass index is 28.28 kg/m .  Physical Exam   GENERAL: healthy, alert and no distress  EYES: Eyes grossly normal to inspection, PERRL and conjunctivae and sclerae normal  HENT: ear canals and TM's normal, nose and mouth without ulcers or lesions  NECK: no adenopathy, no asymmetry, masses, or scars and thyroid normal to palpation  RESP: lungs clear to auscultation - no rales, rhonchi or wheezes  CV: regular rate and rhythm, normal S1 S2, no S3 or S4, no murmur, click or rub, no peripheral edema and peripheral pulses strong  ABDOMEN: soft, nontender, no hepatosplenomegaly, no masses and bowel sounds normal  MS: no gross musculoskeletal defects noted, no edema  SKIN: no suspicious lesions or rashes  NEURO: Normal strength and tone, sensory exam grossly normal, mentation intact, speech normal, cranial nerves 2-12 intact, DTR's normal and symmetric 2+ patellar, 1+ biceps, gait normal including heel/toe/tandem walking, Romberg normal and rapid alternating movements normal  PSYCH: mentation appears normal, affect normal/bright    Diagnostic Test Results:  Results for orders placed or performed in visit on 07/23/19 (from the past 24 hour(s))   CBC with platelets differential   Result Value Ref Range    WBC 7.1 4.0 - 11.0 10e9/L    RBC Count 5.07 4.4 - 5.9 10e12/L    Hemoglobin 15.7 13.3 - 17.7 g/dL    Hematocrit 45.3 40.0 - 53.0 %    MCV 89 78 - 100 fl    MCH 31.0 26.5 - 33.0 pg    MCHC 34.7 31.5 - 36.5 g/dL    RDW 13.3 10.0 - 15.0 %    Platelet Count 235 150 - 450 10e9/L    " % Neutrophils 65.7 %    % Lymphocytes 21.9 %    % Monocytes 10.7 %    % Eosinophils 1.3 %    % Basophils 0.4 %    Absolute Neutrophil 4.7 1.6 - 8.3 10e9/L    Absolute Lymphocytes 1.6 0.8 - 5.3 10e9/L    Absolute Monocytes 0.8 0.0 - 1.3 10e9/L    Absolute Eosinophils 0.1 0.0 - 0.7 10e9/L    Absolute Basophils 0.0 0.0 - 0.2 10e9/L    Diff Method Automated Method          Chest x-ray: pending radiology report  EKG: Atrial bradycardia    Assessment & Plan       ICD-10-CM    1. Blurred vision H53.8 TSH with free T4 reflex     CBC with platelets differential     Comprehensive metabolic panel     Magnesium   2. Left-sided chest pain R07.9 EKG 12-lead complete w/read - Clinics     XR Chest 2 Views   3. Shortness of breath R06.02 EKG 12-lead complete w/read - Clinics     XR Chest 2 Views       Blurred vision:  This was his main concern. Appears to be improving today. He has abnormal visual acuity which it sounds as though he was recommended new prescription for glasses despite having had lasix in the past.  Possibility central nervous system cause for his symptoms but neurologic examination was benign today.  Possibly new migraine variant that is causing his symptoms as well.  Consider referral to neurology as well.  Possibly but low suspicion for peripheral cause of the dizziness symptoms which are more described as lightheadedness.    He would like to see if the glasses help.   If the glasses do not help, his vision changes progress or new symptoms develop especially neurologic symptoms I would recommend obtaining MRI of the brain.     Chest pain/SOB:  Obtained EKG and chest x-ray as well as labs.  CBC was within normal limits, waiting on x-ray results and the EKG no immediate concerns.  The pain was not reproducible on exam and both symptoms are intermittent and not related to activity.  We will notify of labs when available. Possibly pleuritic chest pain but this is short lived, differential also includes but not limited  to MI, pericardial effusion, pleural effusion, pneumonia, asthma, copd (hx of smoking in past), costochondritis.  If persists, worsens or new symptoms develop recommend follow-up for further evaluation.     Return in about 5 days (around 7/28/2019) for If not improving, sooner if worse or new concerns.     Options for treatment and follow-up care were reviewed with the patient and/or guardian. Patient and/or guardian engaged in the decision making process and verbalized understanding of the options discussed and agreed with the final plan.     Ranjan Au PA-C  [facility]    Answers for HPI/ROS submitted by the patient on 7/23/2019   Chronic problems general questions HPI Form  PHQ9 TOTAL SCORE: 7  LUIS 7 TOTAL SCORE: 1

## 2019-07-22 NOTE — TELEPHONE ENCOUNTER
Additional Information    Negative: Pregnant    Negative: Yellow or green discharge (pus) in the eye    Negative: Followed sun lamp or sun exposure (UV keratitis)    Negative: Followed an eye injury    Negative: Foreign body or object is or was lodged in the eye    Negative: Followed getting substance in the eye    Negative: Weakness of the face, arm or leg on one side of the body    Negative: Complete loss of vision in 1 or both eyes    Negative: Severe eye pain    Negative: Severe headache    Negative: Double vision    Blurred vision or visual changes and present now and sudden onset or new (e.g., minutes, hours, days) (Exception: seeing floaters / black specks OR previously diagnosed migraine headaches with same symptoms)    Negative: Patient sounds very sick or weak to the triager    Negative: Flashes of light  (Exception: brief from pressing on the eyeball)    Negative: Eye pain and brief (now gone) blurred vision or visual changes    Negative: Taking digoxin (e.g., Lanoxin, Digitek, Cardoxin, Lanoxicaps; Toloxin in [place]) and blurred vision, yellow vision, or yellow-green halos    Negative: Many floaters in the eye    Negative: Headache and age > 50    Negative: Laser light exposure and blurred vision present > 15 minutes    Negative: Brief (now gone) blurred vision and unexplained    Negative: Single floater (i.e., small speck seems to float across the eye)    Negative: Age > 65 and no eye exam in > 12 months    Negative: Diabetes and no eye exam in > 12 months    Negative: Holds book very close to face or sits very close to TV    Negative: Blurred vision or visual changes and gradual onset (e.g., weeks, months)    Negative: Closes or covers 1 eye to read    Negative: Brief blurred vision (now better), caused by prolonged close-work    Negative: Small flashes of light (brief, now gone), caused by pushing (pressure) on eyeball    Negative: Laser light exposure and now no symptoms (or blurred vision lasts < 15  "minutes)    Answer Assessment - Initial Assessment Questions  1. DESCRIPTION: \"What is the vision loss like? Describe it for me.\" (e.g., complete vision loss, blurred vision, double vision, floaters, etc.)      Woke up this AM with blurred vision. Seen by optometry this AM and did not see any problems with the eyes themselves. Sees things that are out of focus.  2. LOCATION: \"One or both eyes?\" If one, ask: \"Which eye?\"      Both eyes are blurry.  3. SEVERITY: \"Can you see anything?\" If so, ask: \"What can you see?\" (e.g., fine print)      Everything is blurry  4. ONSET: \"When did this begin?\" \"Did it start suddenly or has this been gradual?\"      This AM  5. PATTERN: \"Does this come and go, or has it been constant since it started?\"      Constantly blurry  6. PAIN: \"Is there any pain in your eye(s)?\"  (Scale 1-10; or mild, moderate, severe)      Denies  7. CONTACTS-GLASSES: \"Do you wear contacts or glasses?\"      No  8. CAUSE: \"What do you think is causing this visual problem?\"      Denies  9. OTHER SYMPTOMS: \"Do you have any other symptoms?\" (e.g., confusion, headache, arm or leg weakness, speech problems)      Denies.  10. PREGNANCY: \"Is there any chance you are pregnant?\" \"When was your last menstrual period?\"        No    Protocols used: VISION LOSS OR CHANGE-A-OH        "

## 2019-07-23 ENCOUNTER — ANCILLARY PROCEDURE (OUTPATIENT)
Dept: GENERAL RADIOLOGY | Facility: OTHER | Age: 54
End: 2019-07-23
Attending: PHYSICIAN ASSISTANT
Payer: COMMERCIAL

## 2019-07-23 ENCOUNTER — OFFICE VISIT (OUTPATIENT)
Dept: FAMILY MEDICINE | Facility: OTHER | Age: 54
End: 2019-07-23
Payer: COMMERCIAL

## 2019-07-23 VITALS
SYSTOLIC BLOOD PRESSURE: 130 MMHG | RESPIRATION RATE: 16 BRPM | BODY MASS INDEX: 28.28 KG/M2 | HEIGHT: 71 IN | TEMPERATURE: 98.2 F | HEART RATE: 62 BPM | WEIGHT: 202 LBS | OXYGEN SATURATION: 97 % | DIASTOLIC BLOOD PRESSURE: 84 MMHG

## 2019-07-23 DIAGNOSIS — H53.8 BLURRED VISION: Primary | ICD-10-CM

## 2019-07-23 DIAGNOSIS — R06.02 SHORTNESS OF BREATH: ICD-10-CM

## 2019-07-23 DIAGNOSIS — R07.9 LEFT-SIDED CHEST PAIN: ICD-10-CM

## 2019-07-23 LAB
ALBUMIN SERPL-MCNC: 4.4 G/DL (ref 3.4–5)
ALP SERPL-CCNC: 50 U/L (ref 40–150)
ALT SERPL W P-5'-P-CCNC: 43 U/L (ref 0–70)
ANION GAP SERPL CALCULATED.3IONS-SCNC: 6 MMOL/L (ref 3–14)
AST SERPL W P-5'-P-CCNC: 31 U/L (ref 0–45)
BASOPHILS # BLD AUTO: 0 10E9/L (ref 0–0.2)
BASOPHILS NFR BLD AUTO: 0.4 %
BILIRUB SERPL-MCNC: 0.5 MG/DL (ref 0.2–1.3)
BUN SERPL-MCNC: 24 MG/DL (ref 7–30)
CALCIUM SERPL-MCNC: 9.4 MG/DL (ref 8.5–10.1)
CHLORIDE SERPL-SCNC: 105 MMOL/L (ref 94–109)
CO2 SERPL-SCNC: 28 MMOL/L (ref 20–32)
CREAT SERPL-MCNC: 0.98 MG/DL (ref 0.66–1.25)
DIFFERENTIAL METHOD BLD: NORMAL
EOSINOPHIL # BLD AUTO: 0.1 10E9/L (ref 0–0.7)
EOSINOPHIL NFR BLD AUTO: 1.3 %
ERYTHROCYTE [DISTWIDTH] IN BLOOD BY AUTOMATED COUNT: 13.3 % (ref 10–15)
GFR SERPL CREATININE-BSD FRML MDRD: 87 ML/MIN/{1.73_M2}
GLUCOSE SERPL-MCNC: 98 MG/DL (ref 70–99)
HCT VFR BLD AUTO: 45.3 % (ref 40–53)
HGB BLD-MCNC: 15.7 G/DL (ref 13.3–17.7)
LYMPHOCYTES # BLD AUTO: 1.6 10E9/L (ref 0.8–5.3)
LYMPHOCYTES NFR BLD AUTO: 21.9 %
MAGNESIUM SERPL-MCNC: 2.2 MG/DL (ref 1.6–2.3)
MCH RBC QN AUTO: 31 PG (ref 26.5–33)
MCHC RBC AUTO-ENTMCNC: 34.7 G/DL (ref 31.5–36.5)
MCV RBC AUTO: 89 FL (ref 78–100)
MONOCYTES # BLD AUTO: 0.8 10E9/L (ref 0–1.3)
MONOCYTES NFR BLD AUTO: 10.7 %
NEUTROPHILS # BLD AUTO: 4.7 10E9/L (ref 1.6–8.3)
NEUTROPHILS NFR BLD AUTO: 65.7 %
PLATELET # BLD AUTO: 235 10E9/L (ref 150–450)
POTASSIUM SERPL-SCNC: 4.3 MMOL/L (ref 3.4–5.3)
PROT SERPL-MCNC: 7.7 G/DL (ref 6.8–8.8)
RBC # BLD AUTO: 5.07 10E12/L (ref 4.4–5.9)
SODIUM SERPL-SCNC: 139 MMOL/L (ref 133–144)
TSH SERPL DL<=0.005 MIU/L-ACNC: 1.15 MU/L (ref 0.4–4)
WBC # BLD AUTO: 7.1 10E9/L (ref 4–11)

## 2019-07-23 PROCEDURE — 85025 COMPLETE CBC W/AUTO DIFF WBC: CPT | Performed by: PHYSICIAN ASSISTANT

## 2019-07-23 PROCEDURE — 93000 ELECTROCARDIOGRAM COMPLETE: CPT | Performed by: PHYSICIAN ASSISTANT

## 2019-07-23 PROCEDURE — 84443 ASSAY THYROID STIM HORMONE: CPT | Performed by: PHYSICIAN ASSISTANT

## 2019-07-23 PROCEDURE — 36415 COLL VENOUS BLD VENIPUNCTURE: CPT | Performed by: PHYSICIAN ASSISTANT

## 2019-07-23 PROCEDURE — 83735 ASSAY OF MAGNESIUM: CPT | Performed by: PHYSICIAN ASSISTANT

## 2019-07-23 PROCEDURE — 71046 X-RAY EXAM CHEST 2 VIEWS: CPT

## 2019-07-23 PROCEDURE — 80053 COMPREHEN METABOLIC PANEL: CPT | Performed by: PHYSICIAN ASSISTANT

## 2019-07-23 PROCEDURE — 99214 OFFICE O/P EST MOD 30 MIN: CPT | Performed by: PHYSICIAN ASSISTANT

## 2019-07-23 ASSESSMENT — ANXIETY QUESTIONNAIRES
5. BEING SO RESTLESS THAT IT IS HARD TO SIT STILL: NOT AT ALL
GAD7 TOTAL SCORE: 1
4. TROUBLE RELAXING: SEVERAL DAYS
1. FEELING NERVOUS, ANXIOUS, OR ON EDGE: NOT AT ALL
6. BECOMING EASILY ANNOYED OR IRRITABLE: NOT AT ALL
2. NOT BEING ABLE TO STOP OR CONTROL WORRYING: NOT AT ALL
GAD7 TOTAL SCORE: 1
3. WORRYING TOO MUCH ABOUT DIFFERENT THINGS: NOT AT ALL
7. FEELING AFRAID AS IF SOMETHING AWFUL MIGHT HAPPEN: NOT AT ALL
GAD7 TOTAL SCORE: 1
7. FEELING AFRAID AS IF SOMETHING AWFUL MIGHT HAPPEN: NOT AT ALL

## 2019-07-23 ASSESSMENT — MIFFLIN-ST. JEOR: SCORE: 1781.27

## 2019-07-23 ASSESSMENT — PATIENT HEALTH QUESTIONNAIRE - PHQ9
SUM OF ALL RESPONSES TO PHQ QUESTIONS 1-9: 7
SUM OF ALL RESPONSES TO PHQ QUESTIONS 1-9: 7

## 2019-07-24 ASSESSMENT — ANXIETY QUESTIONNAIRES: GAD7 TOTAL SCORE: 1

## 2019-07-24 ASSESSMENT — PATIENT HEALTH QUESTIONNAIRE - PHQ9: SUM OF ALL RESPONSES TO PHQ QUESTIONS 1-9: 7

## 2019-07-26 NOTE — PROGRESS NOTES
"Subjective     Rebel Garcia is a 53 year old male who presents to clinic today for the following health issues:    HPI   Headache  Onset: 3 weeks     Description:   Location: bilateral in the occipital area   Character: throbbing pain  Frequency:  2-3 times a week   Duration:  20-30 minutes     Intensity: severe    Progression of Symptoms:  same and intermittent    Accompanying Signs & Symptoms:  Stiff neck: YES  Neck or upper back pain: YES  Fever: no  Sinus pressure: no  Nausea or vomiting: YES  Dizziness: YES  Numbness: no  Weakness: no  Visual changes: YES    History:   Head trauma: no  Family history of migraines: no  Previous tests for headaches: no  Neurologist evaluations: no  Able to do daily activities: no  Wake with a headaches: no  Do headaches wake you up: no  Daily pain medication use: no  Work/school stressors/changes: no    Precipitating factors:   Does light make it worse: YES  Does sound make it worse: YES    Alleviating factors:  Does sleep help: no    Therapies Tried and outcome: Ibuprofen (Advil, Motrin) and Tylenol help a little     Last night he reports \"my vision went to crap\" while he was sitting on the couch watching TV.  He states his vision was blurry in both eyes.  He is unsure if one eye it was worse than the other.  After his vision became blurry he had a bad headache that started within 45 seconds to a minute after the onset of blurred vision.  He took some Advil and went to bed.  When he woke up the headache was gone.    He has history of Lasik in left eye.  He recently had an eye exam at [facility] and needs corrective lenses.  He has not picked up the glasses yet.  He states that both eyes need correction.      He has had 4 episodes of blurred vision followed by a migraine headache over the past 3 weeks.  2 of the episodes have been at home and one happened at work.  The first one that happened at work occurred when he was bending over fixing a machine.  The next one that " happened at work occurred when he was working at his computer.  The first one that happened at home occurred when he was sitting on the deck in the right sunshine.  He went into the house to get sunglasses when he experienced blurred vision and the onset of a migraine.  The headaches typically start on the right posterior scalp and move over the entire back of his head.  He says that Tylenol or Advil helps with the pain. Excedrin does not work.     He reports history of migraines a couple years ago.  He states that he would get them every 6 months and then went quite a while without migraines.    He admits that his intake of water is not ideal.  He will usually drink a glass of water in the morning with his medication.  He has a cup of coffee on the way to work.  He drinks another glass of water while he is at work, may be 2 glasses while at work and then a beer or glass of wine at dinner.    Patient Active Problem List   Diagnosis     Pain in joint, lower leg     Encounter for other general counseling or advice on contraception     Encounter for sterilization     Anxiety     Family history of colon cancer     Family history of prostate cancer     Attention deficit hyperactivity disorder (ADHD)     Hyperlipidemia LDL goal <130     Gastroesophageal reflux disease without esophagitis     Past Surgical History:   Procedure Laterality Date     COLONOSCOPY  2/25/2000     COLONOSCOPY  02/16/10     COLONOSCOPY N/A 7/17/2015    Procedure: COMBINED COLONOSCOPY, SINGLE OR MULTIPLE BIOPSY/POLYPECTOMY BY BIOPSY;  Surgeon: Dima Sosa MD;  Location:  GI      CORRECT KIM BORREGO/DARNELL/JILLIAN  05/04/2005    Modified Darnell bunionectomy, right foot.     HC REPAIR UMBILICAL HARDY,5+Y/O, INCARCERATED OR STRANGULATED  6/15/2004     HERNIA REPAIR, INCISIONAL  08/25/2006    Incarcerated recurrent ventral hernia.     HERNIORRHAPHY UMBILICAL  3/29/2011    HERNIORRHAPHY UMBILICAL performed by ALOK WESLEY at  OR        Social History     Tobacco Use     Smoking status: Former Smoker     Packs/day: 0.75     Years: 18.00     Pack years: 13.50     Types: Cigarettes     Last attempt to quit: 2006     Years since quittin.4     Smokeless tobacco: Never Used   Substance Use Topics     Alcohol use: Yes     Comment: occ     Family History   Problem Relation Age of Onset     Allergies Father         iodine     Heart Disease Father         heart attack at 59 - CABG at 60     Cerebrovascular Disease Father      Cancer Mother         colon -  at age 56     Cancer Sister         cervix -  at age 38     Neurologic Disorder Sister         CNS bleed x 2 sisters     Prostate Cancer Brother 58     Other Cancer Brother 62        stage 4 liver cancer     Colon Cancer Sister 68         Current Outpatient Medications   Medication Sig Dispense Refill     escitalopram (LEXAPRO) 20 MG tablet Take 1 tablet (20 mg) by mouth daily 30 tablet 5     Allergies   Allergen Reactions     No Known Allergies      BP Readings from Last 3 Encounters:   19 122/88   19 130/84   19 130/82    Wt Readings from Last 3 Encounters:   19 92.6 kg (204 lb 1.6 oz)   19 91.6 kg (202 lb)   19 91.4 kg (201 lb 8 oz)                    Reviewed and updated as needed this visit by Provider         Review of Systems   ROS COMP: Constitutional, HEENT, cardiovascular, pulmonary, GI, , musculoskeletal, neuro, skin, endocrine and psych systems are negative, except as otherwise noted.      Objective    /88 (BP Location: Right arm, Patient Position: Chair, Cuff Size: Adult Regular)   Pulse 64   Temp 97.7  F (36.5  C) (Temporal)   Resp 16   Ht 1.829 m (6')   Wt 92.6 kg (204 lb 1.6 oz)   SpO2 97%   BMI 27.68 kg/m    Body mass index is 27.68 kg/m .  Physical Exam   GENERAL: healthy, alert and no distress  EYES: Eyes grossly normal to inspection, PERRL and conjunctivae and sclerae normal  HENT: ear canals and TM's  normal, nose and mouth without ulcers or lesions  NECK: no adenopathy, no asymmetry, masses, or scars  RESP: lungs clear to auscultation - no rales, rhonchi or wheezes  CV: regular rate and rhythm, normal S1 S2, no S3 or S4, no murmur, click or rub, no peripheral edema   ABDOMEN: soft, nontender, no hepatosplenomegaly, no masses and bowel sounds normal  MS: no gross musculoskeletal defects noted, no edema  SKIN: no suspicious lesions or rashes  NEURO: Normal strength and tone, sensory exam grossly normal, mentation intact, cranial nerves 2-12 intact, DTR's normal and symmetric, gait normal including heel/toe/tandem walking, Romberg normal and rapid alternating movements normal  PSYCH: mentation appears normal, affect normal/bright    Diagnostic Test Results:  Labs reviewed in Epic        Assessment & Plan     1. Migraine with aura and without status migrainosus, not intractable  Discussed with patient the possibility etiology of symptoms.  Given the history he is giving I suspect a migraine variant.  I discussed with him that changes in his vision now requiring corrective lenses may be triggering migraines.  I also recommended that he ensure he is well-hydrated with water as dehydration may also be a trigger.  His neurological exam is completely normal and he has no other constitutional symptoms.  I recommend that he  the eyeglasses that he was recently prescribed and start wearing them.  If he continues to have migraines or increase in frequency recommend he is seen by neurology for evaluation and treatment.   - NEUROLOGY ADULT REFERRAL    Greater than 50% of 30 minute visit were spent on counseling or coordination of care regarding migraines      No follow-ups on file.    NIEVES Greco [facility]

## 2019-07-29 ENCOUNTER — OFFICE VISIT (OUTPATIENT)
Dept: FAMILY MEDICINE | Facility: OTHER | Age: 54
End: 2019-07-29
Payer: COMMERCIAL

## 2019-07-29 VITALS
DIASTOLIC BLOOD PRESSURE: 88 MMHG | HEART RATE: 64 BPM | BODY MASS INDEX: 27.64 KG/M2 | OXYGEN SATURATION: 97 % | TEMPERATURE: 97.7 F | RESPIRATION RATE: 16 BRPM | HEIGHT: 72 IN | SYSTOLIC BLOOD PRESSURE: 122 MMHG | WEIGHT: 204.1 LBS

## 2019-07-29 DIAGNOSIS — G43.109 MIGRAINE WITH AURA AND WITHOUT STATUS MIGRAINOSUS, NOT INTRACTABLE: Primary | ICD-10-CM

## 2019-07-29 PROCEDURE — 99214 OFFICE O/P EST MOD 30 MIN: CPT | Performed by: STUDENT IN AN ORGANIZED HEALTH CARE EDUCATION/TRAINING PROGRAM

## 2019-07-29 ASSESSMENT — MIFFLIN-ST. JEOR: SCORE: 1808.79

## 2019-08-21 ENCOUNTER — TELEPHONE (OUTPATIENT)
Dept: FAMILY MEDICINE | Facility: OTHER | Age: 54
End: 2019-08-21

## 2019-08-21 NOTE — TELEPHONE ENCOUNTER
You placed a referral for patient to Neurology on 7/29/19.  Patient has not scheduled as of yet.      Please review and forward to team if follow up with the patient is needed.     Thank you!  Jayshree/Clinic Referrals Dyad II

## 2019-08-21 NOTE — LETTER
[facility]  [id number] [place] [id number]  Phone: [phone]        August 22, 2019      Rebel DUENAS Jose                                                                                                                                [id number] [address] [facility] [id number]            Dear Mr. Garcia,    We received a notice that you are to be scheduled with a specialty clinic. The referral has been placed by your provider and you can call to schedule an appointment directly.     Enclosed, you will find the referral with the phone number to call to schedule an appointment.  If you have already scheduled this, you may disregard this letter.    Please call us if you have any questions or concerns.      Thank you,      Your [facility] Team

## 2019-09-24 ENCOUNTER — MYC REFILL (OUTPATIENT)
Dept: FAMILY MEDICINE | Facility: OTHER | Age: 54
End: 2019-09-24

## 2019-09-24 DIAGNOSIS — F41.9 ANXIETY: ICD-10-CM

## 2019-09-24 RX ORDER — ESCITALOPRAM OXALATE 20 MG/1
TABLET ORAL
Qty: 90 TABLET | Refills: 1 | Status: SHIPPED | OUTPATIENT
Start: 2019-09-24 | End: 2019-09-27

## 2019-09-24 RX ORDER — ESCITALOPRAM OXALATE 20 MG/1
20 TABLET ORAL DAILY
Qty: 30 TABLET | Refills: 5
Start: 2019-09-24

## 2019-09-24 NOTE — TELEPHONE ENCOUNTER
Pending Prescriptions:                       Disp   Refills    escitalopram (LEXAPRO) 20 MG tablet       30 tab*5            Sig: Take 1 tablet (20 mg) by mouth daily    Duplicate request.    Zahira Guillen, RN, BSN

## 2019-09-24 NOTE — PROGRESS NOTES
SUBJECTIVE:   CC: Rebel Garcia is an 54 year old male who presents for preventative health visit.     Healthy Habits:     Getting at least 3 servings of Calcium per day:  Yes    Bi-annual eye exam:  Yes    Dental care twice a year:  Yes    Sleep apnea or symptoms of sleep apnea:  Sleep apnea    Diet:  Regular (no restrictions)    Frequency of exercise:  6-7 days/week    Duration of exercise:  30-45 minutes    Taking medications regularly:  Yes    Medication side effects:  Not applicable    PHQ-2 Total Score: 0    Additional concerns today:  No      Today's PHQ-2 Score:   PHQ-2 (  [employer]) 2019   Q1: Little interest or pleasure in doing things 0   Q2: Feeling down, depressed or hopeless 0   PHQ-2 Score 0   Q1: Little interest or pleasure in doing things Not at all   Q2: Feeling down, depressed or hopeless Not at all   PHQ-2 Score 0     Abuse: Current or Past(Physical, Sexual or Emotional)- No  Do you feel safe in your environment? Yes    Social History     Tobacco Use     Smoking status: Former Smoker     Packs/day: 0.75     Years: 18.00     Pack years: 13.50     Types: Cigarettes     Last attempt to quit: 2006     Years since quittin.6     Smokeless tobacco: Never Used   Substance Use Topics     Alcohol use: Yes     Comment: occ       Alcohol Use 2019   Prescreen: >3 drinks/day or >7 drinks/week? No   Prescreen: >3 drinks/day or >7 drinks/week? -     Last PSA:   PSA   Date Value Ref Range Status   2018 0.73 0 - 4 ug/L Final     Comment:     Assay Method:  Chemiluminescence using Siemens Vista analyzer       Reviewed orders with patient. Reviewed health maintenance and updated orders accordingly - Yes  Lab work is in process  Labs reviewed in EPIC  BP Readings from Last 3 Encounters:   19 120/70   19 122/88   19 130/84    Wt Readings from Last 3 Encounters:   19 90.4 kg (199 lb 6.4 oz)   19 92.6 kg (204 lb 1.6 oz)   19 91.6 kg (202 lb)                   Patient Active Problem List   Diagnosis     Pain in joint, lower leg     Encounter for other general counseling or advice on contraception     Encounter for sterilization     Anxiety     Family history of colon cancer     Family history of prostate cancer     Attention deficit hyperactivity disorder (ADHD)     Hyperlipidemia LDL goal <130     Gastroesophageal reflux disease without esophagitis     Past Surgical History:   Procedure Laterality Date     COLONOSCOPY  2000     COLONOSCOPY  02/16/10     COLONOSCOPY N/A 2015    Procedure: COMBINED COLONOSCOPY, SINGLE OR MULTIPLE BIOPSY/POLYPECTOMY BY BIOPSY;  Surgeon: Dima Sosa MD;  Location:  GI     HC CORRECT KIM BORREGO/DARNELL/JILLIAN  2005    Modified Darnell bunionectomy, right foot.     HC REPAIR UMBILICAL HARDY,5+Y/O, INCARCERATED OR STRANGULATED  6/15/2004     HERNIA REPAIR, INCISIONAL  2006    Incarcerated recurrent ventral hernia.     HERNIORRHAPHY UMBILICAL  3/29/2011    HERNIORRHAPHY UMBILICAL performed by ALOK WESLEY at  OR       Social History     Tobacco Use     Smoking status: Former Smoker     Packs/day: 0.75     Years: 18.00     Pack years: 13.50     Types: Cigarettes     Last attempt to quit: 2006     Years since quittin.6     Smokeless tobacco: Never Used   Substance Use Topics     Alcohol use: Yes     Comment: occ     Family History   Problem Relation Age of Onset     Allergies Father         iodine     Heart Disease Father         heart attack at 59 - CABG at 60     Cerebrovascular Disease Father      Cancer Mother         colon -  at age 56     Cancer Sister         cervix -  at age 38     Neurologic Disorder Sister         CNS bleed x 2 sisters     Prostate Cancer Brother 58     Other Cancer Brother 62        stage 4 liver cancer     Colon Cancer Sister 68         Current Outpatient Medications   Medication Sig Dispense Refill     escitalopram (LEXAPRO) 20 MG tablet Take 1  tablet (20 mg) by mouth daily 90 tablet 3     Allergies   Allergen Reactions     No Known Allergies      Recent Labs   Lab Test 07/23/19  1502 05/01/14  1200 01/31/13  1246 09/19/11  0922   LDL  --  82  --  94   HDL  --  61  --  71   TRIG  --  140  --  105   ALT 43  --   --   --    CR 0.98  --   --   --    GFRESTIMATED 87  --   --   --    GFRESTBLACK >90  --   --   --    POTASSIUM 4.3  --   --   --    TSH 1.15  --  1.14 1.58        Reviewed and updated as needed this visit by clinical staff  Tobacco  Allergies  Meds  Med Hx  Surg Hx  Fam Hx  Soc Hx        Reviewed and updated as needed this visit by Provider        Past Medical History:   Diagnosis Date     Gastroesophageal reflux disease without esophagitis 12/6/2018     Hyperlipidemia LDL goal <130 12/6/2018     Migraine, unspecified, without mention of intractable migraine without mention of status migrainosus      Sleep apnea       Past Surgical History:   Procedure Laterality Date     COLONOSCOPY  2/25/2000     COLONOSCOPY  02/16/10     COLONOSCOPY N/A 7/17/2015    Procedure: COMBINED COLONOSCOPY, SINGLE OR MULTIPLE BIOPSY/POLYPECTOMY BY BIOPSY;  Surgeon: Dima Sosa MD;  Location:  GI     HC CORRECT KIM BORREGO/DARNELL/JILLIAN  05/04/2005    Modified Darnell bunionectomy, right foot.     HC REPAIR UMBILICAL HARDY,5+Y/O, INCARCERATED OR STRANGULATED  6/15/2004     HERNIA REPAIR, INCISIONAL  08/25/2006    Incarcerated recurrent ventral hernia.     HERNIORRHAPHY UMBILICAL  3/29/2011    HERNIORRHAPHY UMBILICAL performed by ALOK WESLEY at  OR       Review of Systems   Constitutional: Negative for chills and fever.   HENT: Negative for congestion, ear pain, hearing loss and sore throat.    Eyes: Positive for visual disturbance. Negative for pain.   Respiratory: Negative for cough and shortness of breath.    Cardiovascular: Negative for chest pain, palpitations and peripheral edema.   Gastrointestinal: Negative for abdominal pain,  constipation, diarrhea, heartburn, hematochezia and nausea.   Genitourinary: Negative for dysuria, frequency, genital sores, hematuria and urgency.   Musculoskeletal: Negative for arthralgias, joint swelling and myalgias.   Skin: Negative for rash.   Neurological: Negative for dizziness, weakness, headaches and paresthesias.   Psychiatric/Behavioral: Negative for mood changes. The patient is not nervous/anxious.      He is getting glasses. He has had less in terms of headaches.     OBJECTIVE:   /70   Pulse 72   Temp 97.7  F (36.5  C) (Temporal)   Resp 16   Wt 90.4 kg (199 lb 6.4 oz)   BMI 27.04 kg/m      Physical Exam  GENERAL: healthy, alert and no distress  EYES: Eyes grossly normal to inspection, PERRL and conjunctivae and sclerae normal  HENT: ear canals and TM's normal, nose and mouth without ulcers or lesions  NECK: no adenopathy, no asymmetry, masses, or scars and thyroid normal to palpation  RESP: lungs clear to auscultation - no rales, rhonchi or wheezes  CV: regular rate and rhythm, normal S1 S2, no S3 or S4, no murmur, click or rub, no peripheral edema and peripheral pulses strong  ABDOMEN: soft, nontender, no hepatosplenomegaly, no masses and bowel sounds normal  MS: no gross musculoskeletal defects noted, no edema  SKIN: no suspicious lesions or rashes  NEURO: Normal strength and tone, mentation intact and speech normal  PSYCH: mentation appears normal, affect normal/bright    Diagnostic Test Results:  Labs reviewed in Epic  No results found for this or any previous visit (from the past 24 hour(s)).    ASSESSMENT/PLAN:   1. Routine general medical examination at a health care facility  See notes    2. Anxiety  Stable. Continue current medication(s) and dose(s).   - escitalopram (LEXAPRO) 20 MG tablet; Take 1 tablet (20 mg) by mouth daily  Dispense: 90 tablet; Refill: 3    COUNSELING:   Reviewed preventive health counseling, as reflected in patient instructions       Regular exercise        Healthy diet/nutrition       Vision screening       Consider Hep C screening for patients born between 1945 and 1965       Colon cancer screening       Prostate cancer screening    Estimated body mass index is 27.04 kg/m  as calculated from the following:    Height as of 7/29/19: 1.829 m (6').    Weight as of this encounter: 90.4 kg (199 lb 6.4 oz).     Weight management plan: Discussed healthy diet and exercise guidelines    He declines flu vaccine.     reports that he quit smoking about 13 years ago. His smoking use included cigarettes. He has a 13.50 pack-year smoking history. He has never used smokeless tobacco.      Counseling Resources:  ATP IV Guidelines  Pooled Cohorts Equation Calculator  FRAX Risk Assessment  ICSI Preventive Guidelines  Dietary Guidelines for Americans, 2010  USDA's MyPlate  ASA Prophylaxis  Lung CA Screening    NIEVES Greco [facility]

## 2019-09-24 NOTE — TELEPHONE ENCOUNTER
Pending Prescriptions:                       Disp   Refills    escitalopram (LEXAPRO) 20 MG tablet [Phar*30 tab*5            Sig: TAKE ONE TABLET BY MOUTH ONCE DAILY    Prescription approved per [facility] Refill Protocol.      Zahira Guillen, RN, BSN

## 2019-09-27 ENCOUNTER — OFFICE VISIT (OUTPATIENT)
Dept: FAMILY MEDICINE | Facility: OTHER | Age: 54
End: 2019-09-27
Payer: COMMERCIAL

## 2019-09-27 VITALS
TEMPERATURE: 97.7 F | DIASTOLIC BLOOD PRESSURE: 70 MMHG | BODY MASS INDEX: 27.04 KG/M2 | RESPIRATION RATE: 16 BRPM | WEIGHT: 199.4 LBS | HEART RATE: 72 BPM | SYSTOLIC BLOOD PRESSURE: 120 MMHG

## 2019-09-27 DIAGNOSIS — Z00.00 ROUTINE GENERAL MEDICAL EXAMINATION AT A HEALTH CARE FACILITY: Primary | ICD-10-CM

## 2019-09-27 DIAGNOSIS — F41.9 ANXIETY: ICD-10-CM

## 2019-09-27 PROCEDURE — 99396 PREV VISIT EST AGE 40-64: CPT | Performed by: STUDENT IN AN ORGANIZED HEALTH CARE EDUCATION/TRAINING PROGRAM

## 2019-09-27 RX ORDER — ESCITALOPRAM OXALATE 20 MG/1
20 TABLET ORAL DAILY
Qty: 90 TABLET | Refills: 3 | Status: SHIPPED | OUTPATIENT
Start: 2019-09-27 | End: 2020-04-06

## 2019-09-27 ASSESSMENT — ENCOUNTER SYMPTOMS
NAUSEA: 0
ARTHRALGIAS: 0
COUGH: 0
DIARRHEA: 0
CHILLS: 0
DYSURIA: 0
MYALGIAS: 0
JOINT SWELLING: 0
CONSTIPATION: 0
FREQUENCY: 0
SHORTNESS OF BREATH: 0
WEAKNESS: 0
HEARTBURN: 0
HEMATURIA: 0
NERVOUS/ANXIOUS: 0
ABDOMINAL PAIN: 0
FEVER: 0
SORE THROAT: 0
HEMATOCHEZIA: 0
EYE PAIN: 0
HEADACHES: 0
DIZZINESS: 0
PALPITATIONS: 0
PARESTHESIAS: 0

## 2019-09-27 ASSESSMENT — PAIN SCALES - GENERAL: PAINLEVEL: NO PAIN (0)

## 2019-09-28 ENCOUNTER — HEALTH MAINTENANCE LETTER (OUTPATIENT)
Age: 54
End: 2019-09-28

## 2019-11-18 NOTE — PROGRESS NOTES
"Subjective     Rebel Garcia is a 54 year old male who presents to clinic today for the following health issues:    HPI   Patient would like to discuss getting a CPAP. He had a sleep study done in about 2010 through [facility]. He states nothing has changed since the sleep study \"I still sleep like crap\". He has mild sleep apnea and was told to try a dental appliance. His insurance ended up not covering the sleep study for both him and his wife so they each had to pay 2000 dollars. After finding that out he decided not to go through with getting the dental appliance because he did not want to incur more cost. He reports snoring, feeling tired when he wakes up and is tired throughout the day. He goes to bed at 9 pm and wakes up at 4 am. He has been wearing a FitBit and it says that he gets around 2.6 hours per night of good sleep. It says that he is waking over 13 times per night.     Patient Active Problem List   Diagnosis     Pain in joint, lower leg     Encounter for other general counseling or advice on contraception     Encounter for sterilization     Anxiety     Family history of colon cancer     Family history of prostate cancer     Attention deficit hyperactivity disorder (ADHD)     Hyperlipidemia LDL goal <130     Gastroesophageal reflux disease without esophagitis     Past Surgical History:   Procedure Laterality Date     COLONOSCOPY  2/25/2000     COLONOSCOPY  02/16/10     COLONOSCOPY N/A 7/17/2015    Procedure: COMBINED COLONOSCOPY, SINGLE OR MULTIPLE BIOPSY/POLYPECTOMY BY BIOPSY;  Surgeon: Dima Sosa MD;  Location:  GI      CORRECT KIM BORREGO/DARNELL/WALSH  05/04/2005    Modified Darnell bunionectomy, right foot.     HC REPAIR UMBILICAL HARDY,5+Y/O, INCARCERATED OR STRANGULATED  6/15/2004     HERNIA REPAIR, INCISIONAL  08/25/2006    Incarcerated recurrent ventral hernia.     HERNIORRHAPHY UMBILICAL  3/29/2011    HERNIORRHAPHY UMBILICAL performed by ALOK WESLEY at  [facility]     " "  Social History     Tobacco Use     Smoking status: Former Smoker     Packs/day: 0.75     Years: 18.00     Pack years: 13.50     Types: Cigarettes     Last attempt to quit: 2006     Years since quittin.7     Smokeless tobacco: Never Used   Substance Use Topics     Alcohol use: Yes     Comment: occ     Family History   Problem Relation Age of Onset     Allergies Father         iodine     Heart Disease Father         heart attack at 59 - CABG at 60     Cerebrovascular Disease Father      Cancer Mother         colon -  at age 56     Cancer Sister         cervix -  at age 38     Neurologic Disorder Sister         CNS bleed x 2 sisters     Prostate Cancer Brother 58     Other Cancer Brother 62        stage 4 liver cancer     Colon Cancer Sister 68         Current Outpatient Medications   Medication Sig Dispense Refill     escitalopram (LEXAPRO) 20 MG tablet Take 1 tablet (20 mg) by mouth daily 90 tablet 3     Allergies   Allergen Reactions     No Known Allergies      BP Readings from Last 3 Encounters:   19 136/80   19 120/70   19 122/88    Wt Readings from Last 3 Encounters:   19 92.2 kg (203 lb 3.2 oz)   19 90.4 kg (199 lb 6.4 oz)   19 92.6 kg (204 lb 1.6 oz)                    Reviewed and updated as needed this visit by Provider         Review of Systems   ROS COMP: Constitutional, HEENT, cardiovascular, pulmonary, gi and gu systems are negative, except as otherwise noted.      Objective    /80   Pulse 61   Temp 97.9  F (36.6  C) (Temporal)   Resp 20   Ht 1.829 m (6' 0.01\")   Wt 92.2 kg (203 lb 3.2 oz)   SpO2 97%   BMI 27.55 kg/m    Body mass index is 27.55 kg/m .  Physical Exam   GENERAL: alert and no distress  RESP: lungs clear to auscultation - no rales, rhonchi or wheezes  CV: regular rate and rhythm, normal S1 S2, no S3 or S4, no murmur, click or rub  MS: no gross musculoskeletal defects noted  SKIN: no suspicious lesions or " rashes  NEURO: Normal strength and tone, mentation intact and speech normal  PSYCH: mentation appears normal, affect normal/bright    Diagnostic Test Results:  Labs reviewed in Epic        Assessment & Plan     1. GLENROY (obstructive sleep apnea)  He is interested in treating his sleep apnea. He is not sure if he can be treated based on the results from the sleep study in 2010 or if he has to repeat the study. I recommended he meet with the sleep specialist and discuss options.   - SLEEP EVALUATION & MANAGEMENT REFERRAL - [facility] [phone] (Age 13 and up if over 100 lbs); Future    2. Daytime sleepiness  - SLEEP EVALUATION & MANAGEMENT REFERRAL - [facility] [phone] (Age 13 and up if over 100 lbs); Future    3. Restless legs syndrome (RLS)  New onset over the past 7 months.   - SLEEP EVALUATION & MANAGEMENT REFERRAL - [facility] [phone] (Age 13 and up if over 100 lbs); Future     No follow-ups on file.    NIEVES Greco [facility]

## 2019-11-20 ENCOUNTER — OFFICE VISIT (OUTPATIENT)
Dept: FAMILY MEDICINE | Facility: OTHER | Age: 54
End: 2019-11-20
Payer: COMMERCIAL

## 2019-11-20 VITALS
HEIGHT: 72 IN | BODY MASS INDEX: 27.52 KG/M2 | TEMPERATURE: 97.9 F | HEART RATE: 61 BPM | RESPIRATION RATE: 20 BRPM | WEIGHT: 203.2 LBS | SYSTOLIC BLOOD PRESSURE: 136 MMHG | DIASTOLIC BLOOD PRESSURE: 80 MMHG | OXYGEN SATURATION: 97 %

## 2019-11-20 DIAGNOSIS — G25.81 RESTLESS LEGS SYNDROME (RLS): ICD-10-CM

## 2019-11-20 DIAGNOSIS — R40.0 DAYTIME SLEEPINESS: ICD-10-CM

## 2019-11-20 DIAGNOSIS — G47.33 OSA (OBSTRUCTIVE SLEEP APNEA): Primary | ICD-10-CM

## 2019-11-20 PROCEDURE — 99213 OFFICE O/P EST LOW 20 MIN: CPT | Performed by: STUDENT IN AN ORGANIZED HEALTH CARE EDUCATION/TRAINING PROGRAM

## 2019-11-20 ASSESSMENT — MIFFLIN-ST. JEOR: SCORE: 1799.84

## 2019-12-02 ENCOUNTER — TELEPHONE (OUTPATIENT)
Dept: FAMILY MEDICINE | Facility: OTHER | Age: 54
End: 2019-12-02

## 2019-12-02 NOTE — TELEPHONE ENCOUNTER
Reason for Call:  Other Records    Detailed comments: Patient is requesting clinic to send sleep study report from [facility] to the [facility] Attn: Sahra . (patient was not able to provide fax number)    Phone Number Patient can be reached at: Cell number on file:    Telephone Information:   Mobile [phone]       Best Time: any    Can we leave a detailed message on this number? YES    Call taken on 12/2/2019 at 12:26 PM by Karyn Armendariz

## 2019-12-02 NOTE — TELEPHONE ENCOUNTER
Document is in MA overhead bin. Need fax number for [facility] in [place], called several times no answer in that department.   [phone]  Mercedes Duque MA on 12/2/2019 at 4:24 PM

## 2020-02-05 NOTE — TELEPHONE ENCOUNTER
Called and left message for patient to call back. Patient needs to have a psa blood test done   PAST MEDICAL HISTORY:  Anemia     Bipolar disorder     BPH with urinary obstruction     CKD (chronic kidney disease)     COPD, mild     HLD (hyperlipidemia)     HTN (hypertension)     IBD (inflammatory bowel disease)     PAD (peripheral artery disease)     Prostate CA

## 2020-02-11 ENCOUNTER — OFFICE VISIT (OUTPATIENT)
Dept: SLEEP MEDICINE | Facility: CLINIC | Age: 55
End: 2020-02-11
Attending: STUDENT IN AN ORGANIZED HEALTH CARE EDUCATION/TRAINING PROGRAM
Payer: COMMERCIAL

## 2020-02-11 VITALS
WEIGHT: 205.2 LBS | DIASTOLIC BLOOD PRESSURE: 80 MMHG | SYSTOLIC BLOOD PRESSURE: 130 MMHG | OXYGEN SATURATION: 93 % | BODY MASS INDEX: 27.79 KG/M2 | HEART RATE: 76 BPM | HEIGHT: 72 IN

## 2020-02-11 DIAGNOSIS — R06.83 SNORING: Primary | ICD-10-CM

## 2020-02-11 DIAGNOSIS — G25.81 RESTLESS LEGS SYNDROME (RLS): ICD-10-CM

## 2020-02-11 DIAGNOSIS — G47.33 OSA (OBSTRUCTIVE SLEEP APNEA): ICD-10-CM

## 2020-02-11 DIAGNOSIS — R40.0 DAYTIME SLEEPINESS: ICD-10-CM

## 2020-02-11 PROCEDURE — 99213 OFFICE O/P EST LOW 20 MIN: CPT | Performed by: OTOLARYNGOLOGY

## 2020-02-11 ASSESSMENT — MIFFLIN-ST. JEOR: SCORE: 1808.78

## 2020-02-11 NOTE — PROGRESS NOTES
Sleep Consultation:    Date on this visit: 2/11/2020    Rebel Garcia is sent by Stephanie Dai for a sleep consultation regarding snoring.    Primary Physician: Marilu Gomez     Rebel Garcia reports nightly snoring, gasping and apneas for years.   Had PSG at [facility] in the past and was recommended CPAP.  Rebel goes to sleep at 9:00 PM during the week. He wakes up at 4:30 AM with an alarm. He falls asleep in 5 minutes.  Rebel denies difficulty falling asleep.  He wakes up 5-8 times a night for 5 minutes before falling back to sleep.  Rebel wakes up to uncertain reasons and gasping.  On weekends, Rebel goes to sleep at 9:00 PM.  He wakes up at 10:00 AM without an alarm. He falls asleep in 5 minutes.  Patient gets an average of 5 to 7 hours of sleep per night.     Patient does not use electronics in bed, watch TV in bed and read in bed.     Rebel does not do shift work.  He works day shifts.      Rebel does snore every night. Patient does have a regular bed partner. There is report of snoring and gasping.  He does have witnessed apneas. They occasionally sleep separately.  Patient sleeps on his side. He has frequent restless legs,. Rebel denies any bruxism, sleep walking, sleep talking, dream enactment, sleep paralysis, cataplexy and hypnogogic/hypnopompic hallucinations.    He denies sleep walking, sleep talking, enuresis and sleep terrors as a child.  Rebel denies difficulty breathing through his nose, claustrophobia, reflux at night, heartburn and depression.      Rebel has gained 0-5 pounds in the last year.   Patient's [place] Sleepiness score 20/24 consistent with severe daytime sleepiness.      Rebel naps 1-2 times per day for 30-60 minutes, feels refreshed after naps. He takes no inadvertant naps.  He admits dozing while driving.   Patient was counseled on the importance of driving while alert, to pull over if drowsy, or nap before getting into the vehicle if sleepy.  He uses 1 cups/day of  coffee. Last caffeine intake is usually before noon.    Allergies:    Allergies   Allergen Reactions     No Known Allergies        Medications:    Current Outpatient Medications   Medication Sig Dispense Refill     escitalopram (LEXAPRO) 20 MG tablet Take 1 tablet (20 mg) by mouth daily 90 tablet 3       Problem List:  Patient Active Problem List    Diagnosis Date Noted     Hyperlipidemia LDL goal <130 12/06/2018     Priority: Medium     Gastroesophageal reflux disease without esophagitis 12/06/2018     Priority: Medium     Attention deficit hyperactivity disorder (ADHD) 05/01/2014     Priority: Medium     Problem list name updated by automated process. Provider to review       Family history of colon cancer 09/19/2011     Priority: Medium     Family history of prostate cancer 09/19/2011     Priority: Medium     Anxiety 07/09/2009     Priority: Medium     Encounter for sterilization 02/01/2008     Priority: Medium     Problem list name updated by automated process. Provider to review       Encounter for other general counseling or advice on contraception 11/19/2007     Priority: Medium     Diagnosis updated by automated process. Provider to review and confirm.       Pain in joint, lower leg 09/17/2001     Priority: Medium        Past Medical/Surgical History:  Past Medical History:   Diagnosis Date     Gastroesophageal reflux disease without esophagitis 12/6/2018     Hyperlipidemia LDL goal <130 12/6/2018     Migraine, unspecified, without mention of intractable migraine without mention of status migrainosus      Sleep apnea      Past Surgical History:   Procedure Laterality Date     COLONOSCOPY  2/25/2000     COLONOSCOPY  02/16/10     COLONOSCOPY N/A 7/17/2015    Procedure: COMBINED COLONOSCOPY, SINGLE OR MULTIPLE BIOPSY/POLYPECTOMY BY BIOPSY;  Surgeon: Dima Sosa MD;  Location:  GI     HC CORRECT KIM BORREGO/DARNELL/JILLIAN  05/04/2005    Modified Darnell bunionectomy, right foot.     HC REPAIR  UMBILICAL HARDY,5+Y/O, INCARCERATED OR STRANGULATED  6/15/2004     HERNIA REPAIR, INCISIONAL  2006    Incarcerated recurrent ventral hernia.     HERNIORRHAPHY UMBILICAL  3/29/2011    HERNIORRHAPHY UMBILICAL performed by ALOK WESLEY at  OR       Social History:  Social History     Socioeconomic History     Marital status:      Spouse name: Not on file     Number of children: 2     Years of education: Not on file     Highest education level: Not on file   Occupational History     Occupation: [employer]     Employer: [employer]   Social Needs     Financial resource strain: Not on file     Food insecurity:     Worry: Not on file     Inability: Not on file     Transportation needs:     Medical: Not on file     Non-medical: Not on file   Tobacco Use     Smoking status: Former Smoker     Packs/day: 0.75     Years: 18.00     Pack years: 13.50     Types: Cigarettes     Last attempt to quit: 2006     Years since quittin.0     Smokeless tobacco: Never Used   Substance and Sexual Activity     Alcohol use: Yes     Comment: occ     Drug use: No     Sexual activity: Yes     Partners: Female     Birth control/protection: Pill     Comment: S.O. on birth control pills   Lifestyle     Physical activity:     Days per week: Not on file     Minutes per session: Not on file     Stress: Not on file   Relationships     Social connections:     Talks on phone: Not on file     Gets together: Not on file     Attends Alevism service: Not on file     Active member of club or organization: Not on file     Attends meetings of clubs or organizations: Not on file     Relationship status: Not on file     Intimate partner violence:     Fear of current or ex partner: Not on file     Emotionally abused: Not on file     Physically abused: Not on file     Forced sexual activity: Not on file   Other Topics Concern      Service No     Blood Transfusions No     Caffeine Concern Yes     Comment: 2 pots of coffee  per day     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 No   Social History Narrative     Not on file       Family History:  Family History   Problem Relation Age of Onset     Allergies Father         iodine     Heart Disease Father         heart attack at 59 - CABG at 60     Cerebrovascular Disease Father      Cancer Mother         colon -  at age 56     Cancer Sister         cervix -  at age 38     Neurologic Disorder Sister         CNS bleed x 2 sisters     Prostate Cancer Brother 58     Other Cancer Brother 62        stage 4 liver cancer     Colon Cancer Sister 68       Review of Systems:  A complete review of systems reviewed by me is negative with the exeption of what has been mentioned in the history of present illness.  CONSTITUTIONAL: NEGATIVE for weight gain/loss, fever, chills, sweats or night sweats, drug allergies.  EYES: NEGATIVE for changes in vision, blind spots, double vision.  ENT: NEGATIVE for ear pain, sore throat, sinus pain, post-nasal drip, runny nose, bloody nose  CARDIAC: NEGATIVE for fast heartbeats or fluttering in chest, chest pain or pressure, breathlessness when lying flat, swollen legs or swollen feet.  NEUROLOGIC: NEGATIVE headaches, weakness or numbness in the arms or legs.  DERMATOLOGIC: NEGATIVE for rashes, new moles or change in mole(s)  PULMONARY: NEGATIVE SOB at rest, SOB with activity, dry cough, productive cough, coughing up blood, wheezing or whistling when breathing.    GASTROINTESTINAL: NEGATIVE for nausea or vomitting, loose or watery stools, fat or grease in stools, constipation, abdominal pain, bowel movements black in color or blood noted.  GENITOURINARY:  NEGATIVE for pain during urination, blood in urine, urinating more frequently than usual, irregular menstrual periods.  MUSCULOSKELETAL: NEGATIVE for muscle pain, bone or joint pain, swollen joints.  ENDOCRINE: NEGATIVE for increased thirst or urination, diabetes.  LYMPHATIC: NEGATIVE for swollen lymph nodes, lumps or bumps in the breasts or nipple discharge.    Physical Examination:  Vitals: There were no vitals taken for this visit.  BMI= There is no height or weight on file to calculate BMI.    Neck Cir (cm): 41 cm    [place] Total Score 2/11/2020   Total score - [place] 20       NO Total Score: 19 (02/11/20 1620)    GENERAL APPEARANCE: healthy, alert, no distress and cooperative  EYES: Eyes grossly normal to inspection, PERRL and conjunctivae and sclerae normal  HENT: ear canals and TM's normal and nose and mouth without ulcers or lesions  With oropharynx crowded, long uvula and soft palate  NECK: no adenopathy, no asymmetry, masses, or scars and thyroid normal to palpation  NEURO: Normal strength and tone, mentation intact and speech normal  PSYCH: mentation appears normal and affect normal/bright  Mallampati Class: III.  Tonsillar Stage: 2  visible at pillars.  Class 2 occlusion  Impression/Plan:    The patient with apneas, loud snoring, and severe EDS. STOP BANG 6. Will, therefore, order HST expecting the finding of moderate to severe GLENROY. Since RLS symptoms may be related to untreated GLENROY  WILL diagnose and treat GLENROY FIRST. IF RLS SYMPTOMS CONTINUE TO PERSIST AT NIGHT THE PATIENT MAY NEED FURTHER EVALUATION AND POSSIBLE TREATMENT.  Literature provided regarding sleep apnea, restless leg syndrome and sleep hygiene.      He will follow up with me in approximately two weeks after his sleep study has been competed to review the results and discuss plan of care.       Polysomnography reviewed.  Obstructive sleep apnea reviewed.  Complications of untreated sleep apnea were reviewed.    Jude Vega  MD      CC: Stephanie Sarkar*

## 2020-02-11 NOTE — NURSING NOTE
Does Rebel have a CPAP/Bipap?  No     Weight management plan: Patient was referred to their PCP to discuss a diet and exercise plan.

## 2020-02-12 ENCOUNTER — OFFICE VISIT (OUTPATIENT)
Dept: SLEEP MEDICINE | Facility: CLINIC | Age: 55
End: 2020-02-12
Payer: COMMERCIAL

## 2020-02-12 DIAGNOSIS — G47.33 OSA (OBSTRUCTIVE SLEEP APNEA): ICD-10-CM

## 2020-02-12 PROCEDURE — G0399 HOME SLEEP TEST/TYPE 3 PORTA: HCPCS | Performed by: OTOLARYNGOLOGY

## 2020-02-13 NOTE — PROGRESS NOTES
This HSAT was performed using a Noxturnal T3 device which recorded snore, sound, movement activity, body position, nasal pressure, oronasal thermal airflow, pulse, oximetry and both chest and abdominal respiratory effort. HSAT data was restricted to the time patient states they were in bed.     HSAT was scored using 1B 4% hypopnea rule.     AHI: 36.6. Snoring was reported as loud.  Time with SpO2 below 89% was 28 minutes.   Overall signal quality was good     Pt will follow up with sleep provider to determine appropriate therapy.     Ordering Gary KAISER Oleg, MD Charles O. BA, RUST, RST System Clinical Specialist 02/13/2020

## 2020-02-17 NOTE — PROGRESS NOTES
.Pt is completing a home sleep test. Pt was instructed on how to put on the Noxturnal T3 device and associated equipment before going to bed and given the opportunity to practice putting it on before leaving the sleep center. Pt was reminded to bring the home sleep test kit back to the center tomorrow, at agreed upon time for download and reporting. .Marilyn Prakash

## 2020-02-25 NOTE — PROGRESS NOTES
HOME SLEEP STUDY INTERPRETATION    Patient: Rebel Garcia  MRN: [id number]  YOB: 1965  Study Date: 2/12/2020  Referring Provider: Rock Gomez  Ordering Provider: Jude Vega MD     Indications for Home Study: Rebel Garcia is a 54 year old male with a history of snoring excessive daytime sleepiness who presents with symptoms suggestive of obstructive sleep apnea.    Estimated body mass index is 27.83 kg/m  as calculated from the following:    Height as of 2/11/20: 1.829 m (6').    Weight as of 2/11/20: 93.1 kg (205 lb 3.2 oz).  Total score - [place]: 20 (2/11/2020  4:20 PM)  StopBang Total Score: 6 (2/11/2020  4:20 PM)    Data: A full night home sleep study was performed recording the standard physiologic parameters including body position, movement, sound, nasal pressure, thermal oral airflow, chest and abdominal movements with respiratory inductance plethysmography, and oxygen saturation by pulse oximetry. Pulse rate was estimated by oximetry recording. This study was considered adequate based on > 4 hours of quality oximetry and respiratory recording. As specified by the AASM Manual for the Scoring of Sleep and Associated events, version 2.3, Rule VIII.D 1B, 4% oxygen desaturation scoring for hypopneas is used as a standard of care on all home sleep apnea testing.    Analysis Time:  412 minutes    Respiration:   Sleep Associated Hypoxemia: sustained hypoxemia was present. Baseline oxygen saturation was 92.2 %.  Time with saturation less than or equal to 88% was 28 minutes. The lowest oxygen saturation was 80 %.   Snoring: Snoring was present.  Respiratory events: The home study revealed a presence of 135 obstructive apneas and 2 mixed and central apneas. There were 126 hypopneas resulting in a combined apnea/hypopnea index [AHI] of 36.6 events per hour.  AHI was 8.7 per hour supine, 82.5 per hour prone, 38 per hour on left side, and 74.6 per hour on right side.   Pattern:  Excluding events noted above, respiratory rate and pattern was Normal.    Position: Percent of time spent: supine -40.6 %, prone -18.3 %, on left -34.7 %, on right -6.4 %.    Heart Rate: By pulse oximetry tachycardia was noted.     Assessment:   Severe obstructive sleep apnea.  Sleep associated hypoxemia was present.    Recommendations:  Consider auto-CPAP at 5-15 cmH2O, oral appliance therapy or positional therapy.(Trying and encourage supine position where the patient experienced only mild sleep apnea)  Suggest optimizing sleep hygiene and avoiding sleep deprivation.      Diagnosis Code(s): Obstructive Sleep Apnea G47.33, Hypoxemia G47.36    Jude Vega MD,  February 25, 2020   Diplomate, American Board of Otolaryngology, Sleep Medicine

## 2020-03-03 DIAGNOSIS — G47.33 OSA (OBSTRUCTIVE SLEEP APNEA): Primary | ICD-10-CM

## 2020-03-15 ENCOUNTER — TELEPHONE (OUTPATIENT)
Dept: SLEEP MEDICINE | Facility: CLINIC | Age: 55
End: 2020-03-15

## 2020-03-15 NOTE — TELEPHONE ENCOUNTER
Called patient to reschedule PAP Setup appointment scheduled on 03/16/2020 at the [facility] due to clinic closed. Patient did not answer. Unable to leave a voicemail due to voicemail box is full. Email sent to Respiratory DME Liaison Sridevi MATA with [facility] [employer] to try contacting patient again on 03/16. Patient can call [facility] at [phone] to reschedule.

## 2020-04-06 DIAGNOSIS — F41.9 ANXIETY: ICD-10-CM

## 2020-04-06 RX ORDER — ESCITALOPRAM OXALATE 20 MG/1
TABLET ORAL
Qty: 90 TABLET | Refills: 1 | Status: SHIPPED | OUTPATIENT
Start: 2020-04-06 | End: 2020-10-12

## 2020-05-12 ENCOUNTER — E-VISIT (OUTPATIENT)
Dept: FAMILY MEDICINE | Facility: OTHER | Age: 55
End: 2020-05-12
Payer: COMMERCIAL

## 2020-05-12 DIAGNOSIS — R30.0 DYSURIA: Primary | ICD-10-CM

## 2020-05-12 PROCEDURE — 99421 OL DIG E/M SVC 5-10 MIN: CPT | Performed by: PHYSICIAN ASSISTANT

## 2020-05-12 NOTE — TELEPHONE ENCOUNTER
Provider E-Visit time total (minutes): 5 minutes of chart review and I have sent him some questions at 8:12 AM.   3 more minutes with chart review and answering his questions at 9:27 AM    Electronically signed:    Benedict Vance PA-C

## 2020-05-13 ENCOUNTER — TELEPHONE (OUTPATIENT)
Dept: SLEEP MEDICINE | Facility: CLINIC | Age: 55
End: 2020-05-13

## 2020-05-13 NOTE — TELEPHONE ENCOUNTER
SPOKE WITH PATIENT REGARDING CPAP MACHINE AND SHIPPING PROCESS. DISCUSSED MASK OPTIONS AND DECIDED TO TRY THE N30i. CPAP WAS PACKAGED TODAY AND WILL SHIP OUT TOMORROW.

## 2020-05-22 ENCOUNTER — DOCUMENTATION ONLY (OUTPATIENT)
Dept: SLEEP MEDICINE | Facility: CLINIC | Age: 55
End: 2020-05-22
Payer: COMMERCIAL

## 2020-05-22 NOTE — PROGRESS NOTES
Patient was offered choice of vendor and chose [facility].  Patient Rebel Garcia was set up at virtual set up via telephone visit on May 22, 2020. Patient received a Resmed AirSense 10 Auto. Pressures were set at 5-15 cm H2O.   Patient s ramp is 5 cm H2O for Off and FLEX/EPR is EPR, 2.  Patient received a Resmed Mask name: AIRFIT N3Oi  Nasal mask size Medium, heated tubing and heated humidifier.  Patient does not need to meet compliance.   Gabby Bob

## 2020-05-26 ENCOUNTER — DOCUMENTATION ONLY (OUTPATIENT)
Dept: SLEEP MEDICINE | Facility: CLINIC | Age: 55
End: 2020-05-26
Payer: COMMERCIAL

## 2020-05-26 NOTE — PROGRESS NOTES
3 DAY STM VISIT    Diagnostic AHI:  36.6 HST    Patient contacted for 3 day STM visit.    Confirmed with patient at time of call- N/A Patient is still interested in STM service.     Subjective measures:  Patient having trouble will high pressure and breathing through his nose. Patient waiting for a new mask.     Replacement device: No  STM ordered by provider: Yes     Device type: Auto-CPAP  PAP settings from order::  CPAP min 5 cm  H20       CPAP max 15 cm  H20  Mask type:    Nasal Mask     Device settings from machine      Min CPAP 5.0            Max CPAP 15.0      Assessment: Nightly usage, most nights under four hours.  Action plan: Patient to have 14 day STM visit. Patient has a follow up visit scheduled:   no.     Total time spent on accessing and  interpreting remote patient PAP therapy data  10 minutes  Total time spent counseling, coaching  and reviewing PAP therapy data with patient  5 minutes  [id number] no

## 2020-06-05 ENCOUNTER — DOCUMENTATION ONLY (OUTPATIENT)
Dept: SLEEP MEDICINE | Facility: CLINIC | Age: 55
End: 2020-06-05

## 2020-06-05 NOTE — PROGRESS NOTES
Patient returned call.    Subjective measures:   Patient is struggling with mask fit.  This is causing some early mask removals.  He recently received new mask and things are going much better.      Assessment: Pt not meeting objective benchmarks for AHI, leak and compliance  Patient failing following subjective benchmarks: leak issues getting better with new mask.     [facility]      Total time spent counseling, coaching  and reviewing PAP therapy data with patient  5 minutes     [id number] (this call)    [id number] no (previous call)

## 2020-06-05 NOTE — PROGRESS NOTES
14  DAY STM VISIT    Diagnostic AHI:   36.6  HST    Message left for patient to return call     Assessment: Pt not meeting objective benchmarks for AHI, leak and compliance       Action plan: waiting for patient to return call.  and pt to have 30 day STM visit.      Device type: Auto-CPAP    PAP settings: CPAP min 5.0 cm  H20       CPAP max 15.0 cm  H20            95th% pressure 11.5 cm  H20     Mask type:  Nasal Mask    Objective measures: 14 day rolling measures      Compliance  14 %      Leak  29.87 lpm  last  upload      AHI 6.38   last  upload      Average number of minutes 106      Objective measure goal  Compliance   Goal >70%  Leak   Goal < 24 lpm  AHI  Goal < 5  Usage  Goal >240        Total time spent on accessing and interpreting remote patient PAP therapy data  11minutes    Total time spent counseling, coaching  and reviewing PAP therapy data with patient  0 minutes    [id number]  [id number]  no (3 day STM)

## 2020-06-11 ENCOUNTER — DOCUMENTATION ONLY (OUTPATIENT)
Dept: SLEEP MEDICINE | Facility: CLINIC | Age: 55
End: 2020-06-11
Payer: COMMERCIAL

## 2020-06-11 DIAGNOSIS — G47.33 OSA (OBSTRUCTIVE SLEEP APNEA): Primary | ICD-10-CM

## 2020-06-11 NOTE — PROGRESS NOTES
STM Recheck: Patient call and left message his pressure is high.  Left message with patient will lower pressure with his Provider.

## 2020-06-15 ENCOUNTER — TRANSFERRED RECORDS (OUTPATIENT)
Dept: HEALTH INFORMATION MANAGEMENT | Facility: CLINIC | Age: 55
End: 2020-06-15

## 2020-06-22 ENCOUNTER — DOCUMENTATION ONLY (OUTPATIENT)
Dept: SLEEP MEDICINE | Facility: CLINIC | Age: 55
End: 2020-06-22
Payer: COMMERCIAL

## 2020-06-22 NOTE — PROGRESS NOTES
30 DAY [facility] VISIT    Diagnostic AHI:  36.6 HST    Called patient unable to leave message.     Assessment: Pt not meeting objective benchmarks for compliance     Action plan: waiting for patient to return call.   Patient has not scheduled a follow up visit.   Device type: Auto-CPAP  PAP settings: CPAP min 5.0 cm  H20     CPAP max 15.0 cm  H20    95th% pressure 10.4 cm  H20   Mask type:  Nasal Mask  Objective measures: 14 day rolling measures      Compliance  35 %      Leak  19.11 lpm  last  upload      AHI 4.1   last  upload      Average number of minutes 152      Objective measure goal  Compliance   Goal >70%  Leak   Goal < 24 lpm  AHI  Goal < 5  Usage  Goal >240        Total time spent on accessing and interpreting remote patient PAP therapy data  10 minutes    Total time spent counseling, coaching  and reviewing PAP therapy data with patient  1 minutes     [id number] this call  [id number] no  at 3 or 14 day [facility]

## 2020-06-23 ENCOUNTER — MYC MEDICAL ADVICE (OUTPATIENT)
Dept: FAMILY MEDICINE | Facility: OTHER | Age: 55
End: 2020-06-23

## 2020-06-23 NOTE — PROGRESS NOTES
"Rebel Garcia is a 54 year old male who is being evaluated via a billable telephone visit.      The patient has been notified of following:     \"This telephone visit will be conducted via a call between you and your physician/provider. We have found that certain health care needs can be provided without the need for a physical exam.  This service lets us provide the care you need with a short phone conversation.  If a prescription is necessary we can send it directly to your pharmacy.  If lab work is needed we can place an order for that and you can then stop by our lab to have the test done at a later time.    Telephone visits are billed at different rates depending on your insurance coverage. During this emergency period, for some insurers they may be billed the same as an in-person visit.  Please reach out to your insurance provider with any questions.    If during the course of the call the physician/provider feels a telephone visit is not appropriate, you will not be charged for this service.\"    Patient has given verbal consent for Telephone visit?  Yes    What phone number would you like to be contacted at? [phone]    How would you like to obtain your AVS? Chris Arias     Rebel Garcia is a 54 year old male who presents via phone visit today for the following health issues:    HPI  Anxiety Follow-Up    How are you doing with your anxiety since your last visit? Improved , he really likes the lexapro and feels like the dosage is good. No side effects.  He would like to go back on adderall again, he went off it years ago due to increased irritability.  He would like to start a lower dosage than what he was on.  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    Are you having other symptoms that might be associated with anxiety? No    Have you had a significant life event? No     Are you feeling depressed? No    Do " you have any concerns with your use of alcohol or other drugs? No    Social History     Tobacco Use     Smoking status: Former Smoker     Packs/day: 0.75     Years: 18.00     Pack years: 13.50     Types: Cigarettes     Last attempt to quit: 2006     Years since quittin.3     Smokeless tobacco: Never Used   Substance Use Topics     Alcohol use: Yes     Comment: occ     Drug use: No     LUIS-7 SCORE 3/11/2019 2019 2020   Total Score - - -   Total Score 2 (minimal anxiety) 1 (minimal anxiety) -   Total Score 2 1 0     PHQ 2019   PHQ-9 Total Score 7 1   Q9: Thoughts of better off dead/self-harm past 2 weeks Not at all Not at all     Last PHQ-9 2020   1.  Little interest or pleasure in doing things 1   2.  Feeling down, depressed, or hopeless 0   3.  Trouble falling or staying asleep, or sleeping too much 0   4.  Feeling tired or having little energy 0   5.  Poor appetite or overeating 0   6.  Feeling bad about yourself 0   7.  Trouble concentrating 0   8.  Moving slowly or restless 0   Q9: Thoughts of better off dead/self-harm past 2 weeks 0   PHQ-9 Total Score 1   Difficulty at work, home, or with people Not difficult at all     LUIS-7  2020   1. Feeling nervous, anxious, or on edge 0   2. Not being able to stop or control worrying 0   3. Worrying too much about different things 0   4. Trouble relaxing 0   5. Being so restless that it is hard to sit still 0   6. Becoming easily annoyed or irritable 0   7. Feeling afraid, as if something awful might happen 0   LUIS-7 Total Score 0   If you checked any problems, how difficult have they made it for you to do your work, take care of things at home, or get along with other people? Not difficult at all         How many servings of fruits and vegetables do you eat daily?  0-1    On average, how many sweetened beverages do you drink each day (Examples: soda, juice, sweet tea, etc.  Do NOT count diet or artificially sweetened  beverages)?   0    How many days per week do you exercise enough to make your heart beat faster? 6    How many minutes a day do you exercise enough to make your heart beat faster? 30 - 60    How many days per week do you miss taking your medication? 0           Patient Active Problem List   Diagnosis     Pain in joint, lower leg     Encounter for other general counseling or advice on contraception     Encounter for sterilization     Anxiety     Family history of colon cancer     Family history of prostate cancer     Attention deficit hyperactivity disorder (ADHD)     Hyperlipidemia LDL goal <130     Gastroesophageal reflux disease without esophagitis     Past Surgical History:   Procedure Laterality Date     COLONOSCOPY  2000     COLONOSCOPY  02/16/10     COLONOSCOPY N/A 2015    Procedure: COMBINED COLONOSCOPY, SINGLE OR MULTIPLE BIOPSY/POLYPECTOMY BY BIOPSY;  Surgeon: Dima Sosa MD;  Location:  GI     HC CORRECT KIM BORREGO/DARNELL/JILLIAN  2005    Modified Darnell bunionectomy, right foot.     HC REPAIR UMBILICAL HARDY,5+Y/O, INCARCERATED OR STRANGULATED  6/15/2004     HERNIA REPAIR, INCISIONAL  2006    Incarcerated recurrent ventral hernia.     HERNIORRHAPHY UMBILICAL  3/29/2011    HERNIORRHAPHY UMBILICAL performed by ALOK WESLEY at  [facility]       Social History     Tobacco Use     Smoking status: Former Smoker     Packs/day: 0.75     Years: 18.00     Pack years: 13.50     Types: Cigarettes     Last attempt to quit: 2006     Years since quittin.3     Smokeless tobacco: Never Used   Substance Use Topics     Alcohol use: Yes     Comment: occ     Family History   Problem Relation Age of Onset     Allergies Father         iodine     Heart Disease Father         heart attack at 59 - CABG at 60     Cerebrovascular Disease Father      Cancer Mother         colon -  at age 56     Cancer Sister         cervix -  at age 38     Neurologic Disorder Sister          CNS bleed x 2 sisters     Prostate Cancer Brother 58     Other Cancer Brother 62        stage 4 liver cancer     Colon Cancer Sister 68         Current Outpatient Medications   Medication Sig Dispense Refill     amphetamine-dextroamphetamine (ADDERALL XR) 10 MG 24 hr capsule Take 1 capsule (10 mg) by mouth daily 30 capsule 0     escitalopram (LEXAPRO) 20 MG tablet TAKE ONE TABLET BY MOUTH ONCE DAILY 90 tablet 1     BP Readings from Last 3 Encounters:   02/11/20 130/80   11/20/19 136/80   09/27/19 120/70    Wt Readings from Last 3 Encounters:   02/11/20 93.1 kg (205 lb 3.2 oz)   11/20/19 92.2 kg (203 lb 3.2 oz)   09/27/19 90.4 kg (199 lb 6.4 oz)                    Reviewed and updated as needed this visit by Provider  Tobacco  Allergies  Meds  Problems  Med Hx  Surg Hx  Fam Hx         Review of Systems   CONSTITUTIONAL: NEGATIVE for fever, chills, change in weight  ENT/MOUTH: NEGATIVE for ear, mouth and throat problems  RESP: NEGATIVE for significant cough or SOB  CV: NEGATIVE for chest pain, palpitations or peripheral edema  PSYCHIATRIC: As above, see PHQ 9       Objective   Reported vitals:  There were no vitals taken for this visit.   healthy, alert and no distress  PSYCH: Alert and oriented times 3; coherent speech, normal   rate and volume, able to articulate logical thoughts, able   to abstract reason, no tangential thoughts, no hallucinations   or delusions  His affect is normal and pleasant  RESP: No cough, no audible wheezing, able to talk in full sentences  Remainder of exam unable to be completed due to telephone visits    Diagnostic Test Results:  Labs reviewed in Epic  none         Assessment/Plan:  1. Attention deficit hyperactivity disorder (ADHD), unspecified ADHD type  We will restart lower dose Adderall, I will see him in person in 1 month to recheck ADHD and to check vitals, he will alert me at once if he has any side effects or any cardiac symptoms in particular  -  amphetamine-dextroamphetamine (ADDERALL XR) 10 MG 24 hr capsule; Take 1 capsule (10 mg) by mouth daily  Dispense: 30 capsule; Refill: 0    Return in about 1 month (around 7/24/2020) for depression/anxiety.      Phone call duration:  6 minutes    Marilu Gomez PA-C

## 2020-06-24 ENCOUNTER — VIRTUAL VISIT (OUTPATIENT)
Dept: FAMILY MEDICINE | Facility: OTHER | Age: 55
End: 2020-06-24
Payer: COMMERCIAL

## 2020-06-24 DIAGNOSIS — F90.9 ATTENTION DEFICIT HYPERACTIVITY DISORDER (ADHD), UNSPECIFIED ADHD TYPE: Primary | ICD-10-CM

## 2020-06-24 PROCEDURE — 99213 OFFICE O/P EST LOW 20 MIN: CPT | Mod: TEL | Performed by: PHYSICIAN ASSISTANT

## 2020-06-24 RX ORDER — DEXTROAMPHETAMINE SACCHARATE, AMPHETAMINE ASPARTATE MONOHYDRATE, DEXTROAMPHETAMINE SULFATE AND AMPHETAMINE SULFATE 2.5; 2.5; 2.5; 2.5 MG/1; MG/1; MG/1; MG/1
10 CAPSULE, EXTENDED RELEASE ORAL DAILY
Qty: 30 CAPSULE | Refills: 0 | Status: SHIPPED | OUTPATIENT
Start: 2020-06-24 | End: 2020-07-15 | Stop reason: DRUGHIGH

## 2020-06-24 ASSESSMENT — ANXIETY QUESTIONNAIRES
IF YOU CHECKED OFF ANY PROBLEMS ON THIS QUESTIONNAIRE, HOW DIFFICULT HAVE THESE PROBLEMS MADE IT FOR YOU TO DO YOUR WORK, TAKE CARE OF THINGS AT HOME, OR GET ALONG WITH OTHER PEOPLE: NOT DIFFICULT AT ALL
3. WORRYING TOO MUCH ABOUT DIFFERENT THINGS: NOT AT ALL
GAD7 TOTAL SCORE: 0
5. BEING SO RESTLESS THAT IT IS HARD TO SIT STILL: NOT AT ALL
2. NOT BEING ABLE TO STOP OR CONTROL WORRYING: NOT AT ALL
7. FEELING AFRAID AS IF SOMETHING AWFUL MIGHT HAPPEN: NOT AT ALL
1. FEELING NERVOUS, ANXIOUS, OR ON EDGE: NOT AT ALL
6. BECOMING EASILY ANNOYED OR IRRITABLE: NOT AT ALL

## 2020-06-24 ASSESSMENT — PATIENT HEALTH QUESTIONNAIRE - PHQ9
5. POOR APPETITE OR OVEREATING: NOT AT ALL
SUM OF ALL RESPONSES TO PHQ QUESTIONS 1-9: 1

## 2020-06-25 ASSESSMENT — ANXIETY QUESTIONNAIRES: GAD7 TOTAL SCORE: 0

## 2020-07-01 ENCOUNTER — MYC MEDICAL ADVICE (OUTPATIENT)
Dept: FAMILY MEDICINE | Facility: OTHER | Age: 55
End: 2020-07-01

## 2020-07-01 DIAGNOSIS — F90.9 ATTENTION DEFICIT HYPERACTIVITY DISORDER (ADHD), UNSPECIFIED ADHD TYPE: Primary | ICD-10-CM

## 2020-07-02 ENCOUNTER — ALLIED HEALTH/NURSE VISIT (OUTPATIENT)
Dept: FAMILY MEDICINE | Facility: OTHER | Age: 55
End: 2020-07-02
Payer: COMMERCIAL

## 2020-07-02 VITALS — SYSTOLIC BLOOD PRESSURE: 136 MMHG | DIASTOLIC BLOOD PRESSURE: 78 MMHG | HEART RATE: 76 BPM

## 2020-07-02 DIAGNOSIS — Z01.30 BP CHECK: Primary | ICD-10-CM

## 2020-07-02 PROCEDURE — 99207 ZZC NO CHARGE NURSE ONLY: CPT

## 2020-07-02 RX ORDER — DEXTROAMPHETAMINE SACCHARATE, AMPHETAMINE ASPARTATE, DEXTROAMPHETAMINE SULFATE AND AMPHETAMINE SULFATE 5; 5; 5; 5 MG/1; MG/1; MG/1; MG/1
20 TABLET ORAL 2 TIMES DAILY
Qty: 30 TABLET | Refills: 0 | Status: SHIPPED | OUTPATIENT
Start: 2020-07-02 | End: 2020-07-15 | Stop reason: ALTCHOICE

## 2020-07-02 NOTE — TELEPHONE ENCOUNTER
Please call Rebel, I have reviewed his BP.  I think it is ok to increase the dosage, I have sent in a new prescription of the higher dosage so he will not run out.  Please help him set up in in person visit for med check adhd so I can do exam. This should be in approx 2-4 weeks.  Marilu Gomez PA-C

## 2020-07-02 NOTE — PROGRESS NOTES
Chief Complaint   Patient presents with     Allied Health Visit     BP Check     Patient consents to receive outdoor care: Yes    Upon arrival, patient instructed to proceed to designated location, place vehicle in park, turn off, and remove keys     If we are unable to safely and ergonomically able to provide care- is the patient able to safely able to get out of car and transfer to a chair? Yes        Patient would like to receive their AVS in person after care is given.        Rebel Garcia is a 54 year old patient who comes in today for a Blood Pressure check.  Initial BP:  /78   Pulse 76      Data Unavailable  Disposition: follow-up as previously indicated by provider    Gabby Najera CMA ([facility])

## 2020-07-02 NOTE — Clinical Note
Pt mentioned he is doing 20mg of his dose. Stated to mychart him with directions/follow up.    Gabby Najera CMA ([facility])

## 2020-07-03 RX ORDER — DEXTROAMPHETAMINE SACCHARATE, AMPHETAMINE ASPARTATE MONOHYDRATE, DEXTROAMPHETAMINE SULFATE AND AMPHETAMINE SULFATE 5; 5; 5; 5 MG/1; MG/1; MG/1; MG/1
20 CAPSULE, EXTENDED RELEASE ORAL DAILY
Qty: 30 CAPSULE | Refills: 0 | Status: SHIPPED | OUTPATIENT
Start: 2020-07-03 | End: 2020-07-15

## 2020-07-10 NOTE — PROGRESS NOTES
Subjective     Rebel Garcia is a 54 year old male who presents to clinic today for the following health issues:    History of Present Illness        He eats 0-1 servings of fruits and vegetables daily.He consumes 0 sweetened beverage(s) daily.He exercises with enough effort to increase his heart rate 30 to 60 minutes per day.  He exercises with enough effort to increase his heart rate 7 days per week.   He is taking medications regularly.         Depression Followup    How are you doing with your depression since your last visit? Improved finds Lexapro to be helpful.    Are you having other symptoms that might be associated with depression? No    Have you had a significant life event?  No     Are you feeling anxious or having panic attacks?   No    Do you have any concerns with your use of alcohol or other drugs? No    Social History     Tobacco Use     Smoking status: Former Smoker     Packs/day: 0.75     Years: 18.00     Pack years: 13.50     Types: Cigarettes     Last attempt to quit: 2006     Years since quittin.4     Smokeless tobacco: Never Used   Substance Use Topics     Alcohol use: Yes     Comment: occ     Drug use: No     PHQ 2020 2020 7/15/2020   PHQ-9 Total Score 1 0 0   Q9: Thoughts of better off dead/self-harm past 2 weeks Not at all Not at all Not at all     LUIS-7 SCORE 2020 2020 7/15/2020   Total Score - - -   Total Score - 0 (minimal anxiety) -   Total Score 0 0 0     Last PHQ-9 7/15/2020   1.  Little interest or pleasure in doing things 0   2.  Feeling down, depressed, or hopeless 0   3.  Trouble falling or staying asleep, or sleeping too much 0   4.  Feeling tired or having little energy 0   5.  Poor appetite or overeating 0   6.  Feeling bad about yourself 0   7.  Trouble concentrating 0   8.  Moving slowly or restless 0   Q9: Thoughts of better off dead/self-harm past 2 weeks 0   PHQ-9 Total Score 0   Difficulty at work, home, or with people Not difficult at all      LUIS-7  7/15/2020   1. Feeling nervous, anxious, or on edge 0   2. Not being able to stop or control worrying 0   3. Worrying too much about different things 0   4. Trouble relaxing 0   5. Being so restless that it is hard to sit still 0   6. Becoming easily annoyed or irritable 0   7. Feeling afraid, as if something awful might happen 0   LUIS-7 Total Score 0   If you checked any problems, how difficult have they made it for you to do your work, take care of things at home, or get along with other people? Not difficult at all     He is doing very well with the 20 mg of Adderall.  It is improving his symptoms much more so than the 10 mg. He has not had any side effects specifically from this increased dosage.  Specifically no chest pains, palpitations, abdominal pain, headache, or appetite suppression.  His focus and attention is much improved.  He is more effective at work and his agitation has not increased.    Suicide Assessment Five-step Evaluation and Treatment (SAFE-T)      How many servings of fruits and vegetables do you eat daily?  2-3    On average, how many sweetened beverages do you drink each day (Examples: soda, juice, sweet tea, etc.  Do NOT count diet or artificially sweetened beverages)?   0    How many days per week do you exercise enough to make your heart beat faster? 7    How many minutes a day do you exercise enough to make your heart beat faster? 60 or more    How many days per week do you miss taking your medication? 0        Patient Active Problem List   Diagnosis     Pain in joint, lower leg     Encounter for other general counseling or advice on contraception     Encounter for sterilization     Anxiety     Family history of colon cancer     Family history of prostate cancer     Attention deficit hyperactivity disorder (ADHD)     Hyperlipidemia LDL goal <130     Gastroesophageal reflux disease without esophagitis     Past Surgical History:   Procedure Laterality Date     COLONOSCOPY   2000     COLONOSCOPY  02/16/10     COLONOSCOPY N/A 2015    Procedure: COMBINED COLONOSCOPY, SINGLE OR MULTIPLE BIOPSY/POLYPECTOMY BY BIOPSY;  Surgeon: Dima Sosa MD;  Location:  GI     HC CORRECT KIM BORREGO/DARNELL/WALSH  2005    Modified Darnell bunionectomy, right foot.     HC REPAIR UMBILICAL HARDY,5+Y/O, INCARCERATED OR STRANGULATED  6/15/2004     HERNIA REPAIR, INCISIONAL  2006    Incarcerated recurrent ventral hernia.     HERNIORRHAPHY UMBILICAL  3/29/2011    HERNIORRHAPHY UMBILICAL performed by ALOK WESLEY at  [facility]       Social History     Tobacco Use     Smoking status: Former Smoker     Packs/day: 0.75     Years: 18.00     Pack years: 13.50     Types: Cigarettes     Last attempt to quit: 2006     Years since quittin.4     Smokeless tobacco: Never Used   Substance Use Topics     Alcohol use: Yes     Comment: occ     Family History   Problem Relation Age of Onset     Allergies Father         iodine     Heart Disease Father         heart attack at 59 - CABG at 60     Cerebrovascular Disease Father      Cancer Mother         colon -  at age 56     Cancer Sister         cervix -  at age 38     Neurologic Disorder Sister         CNS bleed x 2 sisters     Prostate Cancer Brother 58     Other Cancer Brother 62        stage 4 liver cancer     Colon Cancer Sister 68         Current Outpatient Medications   Medication Sig Dispense Refill     [START ON 9/3/2020] amphetamine-dextroamphetamine (ADDERALL XR) 20 MG 24 hr capsule Take 1 capsule (20 mg) by mouth daily 30 capsule 0     [START ON 8/3/2020] amphetamine-dextroamphetamine (ADDERALL XR) 20 MG 24 hr capsule Take 1 capsule (20 mg) by mouth daily 30 capsule 0     [START ON 9/3/2020] amphetamine-dextroamphetamine (ADDERALL XR) 20 MG 24 hr capsule Take 1 capsule (20 mg) by mouth daily 30 capsule 0     escitalopram (LEXAPRO) 20 MG tablet TAKE ONE TABLET BY MOUTH ONCE DAILY 90 tablet 1      amphetamine-dextroamphetamine (ADDERALL XR) 10 MG 24 hr capsule Take 1 capsule (10 mg) by mouth daily 30 capsule 0     amphetamine-dextroamphetamine (ADDERALL) 20 MG tablet Take 1 tablet (20 mg) by mouth 2 times daily 30 tablet 0     BP Readings from Last 3 Encounters:   07/15/20 114/70   07/02/20 136/78   02/11/20 130/80    Wt Readings from Last 3 Encounters:   07/15/20 91.6 kg (202 lb)   02/11/20 93.1 kg (205 lb 3.2 oz)   11/20/19 92.2 kg (203 lb 3.2 oz)                    Reviewed and updated as needed this visit by Provider  Tobacco  Allergies  Meds  Problems  Med Hx  Surg Hx  Fam Hx         Review of Systems   CONSTITUTIONAL: NEGATIVE for fever, chills, change in weight  ENT/MOUTH: NEGATIVE for ear, mouth and throat problems  RESP: NEGATIVE for significant cough or SOB  CV: NEGATIVE for chest pain, palpitations or peripheral edema  GI: NEGATIVE for nausea, abdominal pain, heartburn, or change in bowel habits  PSYCHIATRIC: NEGATIVE for changes in mood or affect      Objective    /70   Pulse 66   Temp 98  F (36.7  C)   Resp 14   Ht 1.829 m (6')   Wt 91.6 kg (202 lb)   BMI 27.40 kg/m    Body mass index is 27.4 kg/m .  Physical Exam   GENERAL: healthy, alert and no distress  NECK: no adenopathy, no asymmetry, masses, or scars and thyroid normal to palpation  RESP: lungs clear to auscultation - no rales, rhonchi or wheezes  CV: regular rate and rhythm, normal S1 S2, no S3 or S4, no murmur, click or rub, no peripheral edema and peripheral pulses strong  MS: no gross musculoskeletal defects noted, no edema  PSYCH: mentation appears normal, affect normal/bright    Diagnostic Test Results:  Labs reviewed in Epic  none         Assessment & Plan     1. Attention deficit hyperactivity disorder (ADHD), unspecified ADHD type  Improved, continue current dosage his vital signs are stable and looking good, we will recheck in 3 months  - amphetamine-dextroamphetamine (ADDERALL XR) 20 MG 24 hr capsule; Take 1  capsule (20 mg) by mouth daily  Dispense: 30 capsule; Refill: 0  - amphetamine-dextroamphetamine (ADDERALL XR) 20 MG 24 hr capsule; Take 1 capsule (20 mg) by mouth daily  Dispense: 30 capsule; Refill: 0  - amphetamine-dextroamphetamine (ADDERALL XR) 20 MG 24 hr capsule; Take 1 capsule (20 mg) by mouth daily  Dispense: 30 capsule; Refill: 0    2. Special screening for malignant neoplasms, colon  Due for colonoscopy which is ordered  - GASTROENTEROLOGY ADULT REF PROCEDURE ONLY; Future    3. Hyperlipidemia LDL goal <130  Future lab  - Lipid panel reflex to direct LDL Fasting; Future           Return in about 3 months (around 10/15/2020), or if symptoms worsen or fail to improve, for adhd.    Marilu Gomez PA-C  [facility]    Answers for HPI/ROS submitted by the patient on 7/14/2020   Chronic problems general questions HPI Form  PHQ9 TOTAL SCORE: 0  LUIS 7 TOTAL SCORE: 0

## 2020-07-14 ASSESSMENT — PATIENT HEALTH QUESTIONNAIRE - PHQ9
SUM OF ALL RESPONSES TO PHQ QUESTIONS 1-9: 0
SUM OF ALL RESPONSES TO PHQ QUESTIONS 1-9: 0

## 2020-07-14 ASSESSMENT — ANXIETY QUESTIONNAIRES
5. BEING SO RESTLESS THAT IT IS HARD TO SIT STILL: NOT AT ALL
3. WORRYING TOO MUCH ABOUT DIFFERENT THINGS: NOT AT ALL
4. TROUBLE RELAXING: NOT AT ALL
2. NOT BEING ABLE TO STOP OR CONTROL WORRYING: NOT AT ALL
7. FEELING AFRAID AS IF SOMETHING AWFUL MIGHT HAPPEN: NOT AT ALL
1. FEELING NERVOUS, ANXIOUS, OR ON EDGE: NOT AT ALL
GAD7 TOTAL SCORE: 0
7. FEELING AFRAID AS IF SOMETHING AWFUL MIGHT HAPPEN: NOT AT ALL
6. BECOMING EASILY ANNOYED OR IRRITABLE: NOT AT ALL

## 2020-07-15 ENCOUNTER — OFFICE VISIT (OUTPATIENT)
Dept: FAMILY MEDICINE | Facility: OTHER | Age: 55
End: 2020-07-15
Payer: COMMERCIAL

## 2020-07-15 VITALS
SYSTOLIC BLOOD PRESSURE: 114 MMHG | TEMPERATURE: 98 F | HEIGHT: 72 IN | WEIGHT: 202 LBS | HEART RATE: 66 BPM | DIASTOLIC BLOOD PRESSURE: 70 MMHG | BODY MASS INDEX: 27.36 KG/M2 | RESPIRATION RATE: 14 BRPM

## 2020-07-15 DIAGNOSIS — F90.9 ATTENTION DEFICIT HYPERACTIVITY DISORDER (ADHD), UNSPECIFIED ADHD TYPE: ICD-10-CM

## 2020-07-15 DIAGNOSIS — Z12.11 SPECIAL SCREENING FOR MALIGNANT NEOPLASMS, COLON: Primary | ICD-10-CM

## 2020-07-15 DIAGNOSIS — E78.5 HYPERLIPIDEMIA LDL GOAL <130: ICD-10-CM

## 2020-07-15 PROCEDURE — 99213 OFFICE O/P EST LOW 20 MIN: CPT | Performed by: PHYSICIAN ASSISTANT

## 2020-07-15 RX ORDER — DEXTROAMPHETAMINE SACCHARATE, AMPHETAMINE ASPARTATE MONOHYDRATE, DEXTROAMPHETAMINE SULFATE AND AMPHETAMINE SULFATE 5; 5; 5; 5 MG/1; MG/1; MG/1; MG/1
20 CAPSULE, EXTENDED RELEASE ORAL DAILY
Qty: 30 CAPSULE | Refills: 0 | Status: SHIPPED | OUTPATIENT
Start: 2020-09-03 | End: 2020-08-17

## 2020-07-15 RX ORDER — DEXTROAMPHETAMINE SACCHARATE, AMPHETAMINE ASPARTATE MONOHYDRATE, DEXTROAMPHETAMINE SULFATE AND AMPHETAMINE SULFATE 5; 5; 5; 5 MG/1; MG/1; MG/1; MG/1
20 CAPSULE, EXTENDED RELEASE ORAL DAILY
Qty: 30 CAPSULE | Refills: 0 | Status: SHIPPED | OUTPATIENT
Start: 2020-08-03 | End: 2020-07-28

## 2020-07-15 ASSESSMENT — ANXIETY QUESTIONNAIRES
2. NOT BEING ABLE TO STOP OR CONTROL WORRYING: NOT AT ALL
GAD7 TOTAL SCORE: 0
IF YOU CHECKED OFF ANY PROBLEMS ON THIS QUESTIONNAIRE, HOW DIFFICULT HAVE THESE PROBLEMS MADE IT FOR YOU TO DO YOUR WORK, TAKE CARE OF THINGS AT HOME, OR GET ALONG WITH OTHER PEOPLE: NOT DIFFICULT AT ALL
GAD7 TOTAL SCORE: 0
1. FEELING NERVOUS, ANXIOUS, OR ON EDGE: NOT AT ALL
6. BECOMING EASILY ANNOYED OR IRRITABLE: NOT AT ALL
3. WORRYING TOO MUCH ABOUT DIFFERENT THINGS: NOT AT ALL
5. BEING SO RESTLESS THAT IT IS HARD TO SIT STILL: NOT AT ALL
7. FEELING AFRAID AS IF SOMETHING AWFUL MIGHT HAPPEN: NOT AT ALL

## 2020-07-15 ASSESSMENT — PATIENT HEALTH QUESTIONNAIRE - PHQ9
SUM OF ALL RESPONSES TO PHQ QUESTIONS 1-9: 0
5. POOR APPETITE OR OVEREATING: NOT AT ALL

## 2020-07-15 ASSESSMENT — MIFFLIN-ST. JEOR: SCORE: 1794.27

## 2020-07-15 ASSESSMENT — PAIN SCALES - GENERAL: PAINLEVEL: NO PAIN (0)

## 2020-07-21 ENCOUNTER — TRANSFERRED RECORDS (OUTPATIENT)
Dept: HEALTH INFORMATION MANAGEMENT | Facility: CLINIC | Age: 55
End: 2020-07-21

## 2020-07-21 ENCOUNTER — TELEPHONE (OUTPATIENT)
Dept: FAMILY MEDICINE | Facility: OTHER | Age: 55
End: 2020-07-21

## 2020-07-27 ENCOUNTER — MYC MEDICAL ADVICE (OUTPATIENT)
Dept: FAMILY MEDICINE | Facility: OTHER | Age: 55
End: 2020-07-27

## 2020-07-27 DIAGNOSIS — F90.9 ATTENTION DEFICIT HYPERACTIVITY DISORDER (ADHD), UNSPECIFIED ADHD TYPE: ICD-10-CM

## 2020-07-27 RX ORDER — DEXTROAMPHETAMINE SACCHARATE, AMPHETAMINE ASPARTATE MONOHYDRATE, DEXTROAMPHETAMINE SULFATE AND AMPHETAMINE SULFATE 5; 5; 5; 5 MG/1; MG/1; MG/1; MG/1
20 CAPSULE, EXTENDED RELEASE ORAL DAILY
Qty: 30 CAPSULE | Refills: 0 | Status: CANCELLED | OUTPATIENT
Start: 2020-09-03

## 2020-07-27 NOTE — TELEPHONE ENCOUNTER
Please  Call lorie chung- ok for early fill due to travel for Rebel for his adderall.  Marilu Gomez PA-C

## 2020-07-28 RX ORDER — DEXTROAMPHETAMINE SACCHARATE, AMPHETAMINE ASPARTATE MONOHYDRATE, DEXTROAMPHETAMINE SULFATE AND AMPHETAMINE SULFATE 5; 5; 5; 5 MG/1; MG/1; MG/1; MG/1
20 CAPSULE, EXTENDED RELEASE ORAL DAILY
Qty: 30 CAPSULE | Refills: 0 | Status: SHIPPED | OUTPATIENT
Start: 2020-07-28 | End: 2020-08-17 | Stop reason: SINTOL

## 2020-07-28 NOTE — TELEPHONE ENCOUNTER
Per [facility] pharmacy, please send a new RX with date of Ok to fill early, the old script has a had written date of  on it and they can not change it   Thanks  Radha Morales RT (R)

## 2020-07-28 NOTE — TELEPHONE ENCOUNTER
Pharmacy has received they will work on it   Patient informed   Closing encounter  Radha Morales RT (R)

## 2020-08-11 ENCOUNTER — MYC MEDICAL ADVICE (OUTPATIENT)
Dept: FAMILY MEDICINE | Facility: OTHER | Age: 55
End: 2020-08-11

## 2020-08-11 ASSESSMENT — ANXIETY QUESTIONNAIRES
7. FEELING AFRAID AS IF SOMETHING AWFUL MIGHT HAPPEN: NOT AT ALL
4. TROUBLE RELAXING: NOT AT ALL
2. NOT BEING ABLE TO STOP OR CONTROL WORRYING: NOT AT ALL
5. BEING SO RESTLESS THAT IT IS HARD TO SIT STILL: NOT AT ALL
1. FEELING NERVOUS, ANXIOUS, OR ON EDGE: NOT AT ALL
3. WORRYING TOO MUCH ABOUT DIFFERENT THINGS: NOT AT ALL
GAD7 TOTAL SCORE: 0
GAD7 TOTAL SCORE: 0
7. FEELING AFRAID AS IF SOMETHING AWFUL MIGHT HAPPEN: NOT AT ALL
GAD7 TOTAL SCORE: 0
6. BECOMING EASILY ANNOYED OR IRRITABLE: NOT AT ALL

## 2020-08-11 ASSESSMENT — PATIENT HEALTH QUESTIONNAIRE - PHQ9
SUM OF ALL RESPONSES TO PHQ QUESTIONS 1-9: 2
SUM OF ALL RESPONSES TO PHQ QUESTIONS 1-9: 2
10. IF YOU CHECKED OFF ANY PROBLEMS, HOW DIFFICULT HAVE THESE PROBLEMS MADE IT FOR YOU TO DO YOUR WORK, TAKE CARE OF THINGS AT HOME, OR GET ALONG WITH OTHER PEOPLE: NOT DIFFICULT AT ALL

## 2020-08-11 NOTE — PROGRESS NOTES
"Rebel Garcia is a 54 year old male who is being evaluated via a billable telephone visit.      The patient has been notified of following:     \"This telephone visit will be conducted via a call between you and your physician/provider. We have found that certain health care needs can be provided without the need for a physical exam.  This service lets us provide the care you need with a short phone conversation.  If a prescription is necessary we can send it directly to your pharmacy.  If lab work is needed we can place an order for that and you can then stop by our lab to have the test done at a later time.    Telephone visits are billed at different rates depending on your insurance coverage. During this emergency period, for some insurers they may be billed the same as an in-person visit.  Please reach out to your insurance provider with any questions.    If during the course of the call the physician/provider feels a telephone visit is not appropriate, you will not be charged for this service.\"    Patient has given verbal consent for Telephone visit?  Yes    What phone number would you like to be contacted at? [phone]    How would you like to obtain your AVS? Chris Arias     Rebel Garcia is a 54 year old male who presents via phone visit today for the following health issues:    History of Present Illness        Mental Health Follow-up:  Patient presents to follow-up on Depression.Patient's depression since last visit has been:  Good  The patient is not having other symptoms associated with depression.  Patient's anxiety since last visit has been:  Good  The patient is not having other symptoms associated with anxiety.  Any significant life events: No  Patient is not feeling anxious or having panic attacks.  Patient has no concerns about alcohol or drug use.     Social History  Tobacco Use    Smoking status: Former Smoker      Packs/day: 0.75      Years: 18.00      Pack years: 13.5      " Types: Cigarettes      Quit date: 2006      Years since quittin.5    Smokeless tobacco: Never Used  Alcohol use: Yes    Comment: occ  Drug use: No      Today's PHQ-9         PHQ-9 Total Score:     (P) 2   PHQ-9 Q9 Thoughts of better off dead/self-harm past 2 weeks :   (P) Not at all   Thoughts of suicide or self harm:      Self-harm Plan:        Self-harm Action:          Safety concerns for self or others:           He eats 0-1 servings of fruits and vegetables daily.He consumes 0 sweetened beverage(s) daily.He exercises with enough effort to increase his heart rate 10 to 19 minutes per day.  He exercises with enough effort to increase his heart rate 7 days per week.   He is taking medications regularly.        He discontinued Adderall due to weight loss.  He went from 206 pounds down to 189.  Now that he is off of it his appetite is better and he has regained a little bit of weight.  He liked what the Adderall did at his current dosage as far as controlling his task completion, concentration, etc. however he had no appetite whatsoever.  His son has done well on Focalin and he would like to try the generic version of this.    Patient Active Problem List   Diagnosis     Pain in joint, lower leg     Encounter for other general counseling or advice on contraception     Encounter for sterilization     Anxiety     Family history of colon cancer     Family history of prostate cancer     Attention deficit hyperactivity disorder (ADHD)     Hyperlipidemia LDL goal <130     Gastroesophageal reflux disease without esophagitis     Past Surgical History:   Procedure Laterality Date     COLONOSCOPY  2000     COLONOSCOPY  02/16/10     COLONOSCOPY N/A 2015    Procedure: COMBINED COLONOSCOPY, SINGLE OR MULTIPLE BIOPSY/POLYPECTOMY BY BIOPSY;  Surgeon: Dima Sosa MD;  Location:  GI     HC CORRECT KIM BORREGO/DARNELL/JILLIAN  2005    Modified Darnell bunionectomy, right foot.     HC REPAIR  UMBILICAL HARDY,5+Y/O, INCARCERATED OR STRANGULATED  6/15/2004     HERNIA REPAIR, INCISIONAL  2006    Incarcerated recurrent ventral hernia.     HERNIORRHAPHY UMBILICAL  3/29/2011    HERNIORRHAPHY UMBILICAL performed by ALOK WESLEY at [facility]       Social History     Tobacco Use     Smoking status: Former Smoker     Packs/day: 0.75     Years: 18.00     Pack years: 13.50     Types: Cigarettes     Last attempt to quit: 2006     Years since quittin.5     Smokeless tobacco: Never Used   Substance Use Topics     Alcohol use: Yes     Comment: occ     Family History   Problem Relation Age of Onset     Allergies Father         iodine     Heart Disease Father         heart attack at 59 - CABG at 60     Cerebrovascular Disease Father      Cancer Mother         colon -  at age 56     Cancer Sister         cervix -  at age 38     Neurologic Disorder Sister         CNS bleed x 2 sisters     Prostate Cancer Brother 58     Other Cancer Brother 62        stage 4 liver cancer     Colon Cancer Sister 68         Current Outpatient Medications   Medication Sig Dispense Refill     escitalopram (LEXAPRO) 20 MG tablet TAKE ONE TABLET BY MOUTH ONCE DAILY 90 tablet 1     Allergies   Allergen Reactions     No Known Allergies      BP Readings from Last 3 Encounters:   07/15/20 114/70   20 136/78   20 130/80    Wt Readings from Last 3 Encounters:   07/15/20 91.6 kg (202 lb)   20 93.1 kg (205 lb 3.2 oz)   19 92.2 kg (203 lb 3.2 oz)                    Reviewed and updated as needed this visit by Provider         Review of Systems   CONSTITUTIONAL: NEGATIVE for fever, chills, change in weight  ENT/MOUTH: NEGATIVE for ear, mouth and throat problems  RESP: NEGATIVE for significant cough or SOB  CV: NEGATIVE for chest pain, palpitations or peripheral edema  PSYCHIATRIC: NEGATIVE for changes in mood or affect       Objective   Reported vitals:  There were no vitals taken for this visit.    healthy, alert and no distress  PSYCH: Alert and oriented times 3; coherent speech, normal   rate and volume, able to articulate logical thoughts, able   to abstract reason, no tangential thoughts, no hallucinations   or delusions  His affect is normal and pleasant  RESP: No cough, no audible wheezing, able to talk in full sentences  Remainder of exam unable to be completed due to telephone visits    Diagnostic Test Results:  Labs reviewed in Epic  none         Assessment/Plan:    1. Attention deficit hyperactivity disorder (ADHD), unspecified ADHD type  He will have [facility] bp check today and f2f visit in 1 month, he will contact me at once if having any side effects or issues with this med  - dexmethylphenidate (FOCALIN XR) 15 MG 24 hr capsule; Take 1 capsule (15 mg) by mouth daily  Dispense: 30 capsule; Refill: 0    No follow-ups on file.      Phone call duration:  5 minutes    Marilu Gomez PA-C      Answers for HPI/ROS submitted by the patient on 8/11/2020   Chronic problems general questions HPI Form  If you checked off any problems, how difficult have these problems made it for you to do your work, take care of things at home, or get along with other people?: Not difficult at all  PHQ9 TOTAL SCORE: 2  LUIS 7 TOTAL SCORE: 0

## 2020-08-11 NOTE — TELEPHONE ENCOUNTER
"I am not sure what he means by \"put in for an appointment\"  I don't have any openings this week ,evisit would work anytime or next week is estella Gomez PA-C   "

## 2020-08-12 ASSESSMENT — PATIENT HEALTH QUESTIONNAIRE - PHQ9: SUM OF ALL RESPONSES TO PHQ QUESTIONS 1-9: 2

## 2020-08-12 ASSESSMENT — ANXIETY QUESTIONNAIRES: GAD7 TOTAL SCORE: 0

## 2020-08-17 ENCOUNTER — VIRTUAL VISIT (OUTPATIENT)
Dept: FAMILY MEDICINE | Facility: OTHER | Age: 55
End: 2020-08-17
Payer: COMMERCIAL

## 2020-08-17 ENCOUNTER — ALLIED HEALTH/NURSE VISIT (OUTPATIENT)
Dept: FAMILY MEDICINE | Facility: OTHER | Age: 55
End: 2020-08-17
Payer: COMMERCIAL

## 2020-08-17 ENCOUNTER — TELEPHONE (OUTPATIENT)
Dept: FAMILY MEDICINE | Facility: OTHER | Age: 55
End: 2020-08-17

## 2020-08-17 VITALS — HEART RATE: 65 BPM | SYSTOLIC BLOOD PRESSURE: 129 MMHG | DIASTOLIC BLOOD PRESSURE: 78 MMHG

## 2020-08-17 DIAGNOSIS — F90.9 ATTENTION DEFICIT HYPERACTIVITY DISORDER (ADHD), UNSPECIFIED ADHD TYPE: Primary | ICD-10-CM

## 2020-08-17 DIAGNOSIS — Z01.30 BP CHECK: Primary | ICD-10-CM

## 2020-08-17 PROCEDURE — 99207 ZZC NO CHARGE NURSE ONLY: CPT

## 2020-08-17 PROCEDURE — 99213 OFFICE O/P EST LOW 20 MIN: CPT | Mod: TEL | Performed by: PHYSICIAN ASSISTANT

## 2020-08-17 RX ORDER — DEXMETHYLPHENIDATE HYDROCHLORIDE 15 MG/1
15 CAPSULE, EXTENDED RELEASE ORAL DAILY
Qty: 30 CAPSULE | Refills: 0 | Status: SHIPPED | OUTPATIENT
Start: 2020-08-17 | End: 2020-09-29

## 2020-08-17 NOTE — PROGRESS NOTES
Patient consents to receive outdoor care: Yes    Upon arrival, patient instructed to proceed to designated location, place vehicle in park, turn off, and remove keys     If we are unable to safely and ergonomically able to provide care- is the patient able to safely able to get out of car and transfer to a chair? Yes    Patient would like to receive their AVS via [employer].    Rebel Garcia is a 55 year old patient who comes in today for a Blood Pressure check.  Initial BP:  /78   Pulse 65      71  Disposition: follow-up as previously indicated by provider and results routed to provider      Cassia Funes MA

## 2020-08-17 NOTE — TELEPHONE ENCOUNTER
Left message for pt to return our call    Seen virtually by NP today.     0  Need float nurse appt bp recheck today, will be in [place] around 2:30 if that works, please set up f2f appointment with me in 1 month also let him know that we cannot sign controlled substance prescriptions at this point due to computer issues hopefully this will be fixed within the day

## 2020-08-27 ENCOUNTER — MYC MEDICAL ADVICE (OUTPATIENT)
Dept: FAMILY MEDICINE | Facility: OTHER | Age: 55
End: 2020-08-27

## 2020-09-08 NOTE — TELEPHONE ENCOUNTER
Please assist patient in scheduling a face-to-face appointment with me for ADHD recheck  Marilu Gomez PA-C

## 2020-09-11 NOTE — PROGRESS NOTES
Subjective     Rebel Garcia is a 55 year old male who presents to clinic today for the following health issues:    History of Present Illness        Hypertension: He presents for follow up of hypertension.  He does not check blood pressure  regularly outside of the clinic. Outpatient blood pressures have not been over 140/90. He does not follow a low salt diet.     He eats 0-1 servings of fruits and vegetables daily.He consumes 0 sweetened beverage(s) daily.He exercises with enough effort to increase his heart rate 9 or less minutes per day.  He exercises with enough effort to increase his heart rate 4 days per week.   He is taking medications regularly.       He has noticed improvement in his ADD with Focalin however the med abruptly wears off by 1/1/1930.  He does take it at 5:00 in the morning.  Works until about 3 PM.  He does not have any side effects with this med.  He is able to sleep without issues.  His appetite is not suppressed.  He had significant appetite suppression with Adderall.  He lost significant weight, he was down to 179 pounds at one point.  Ritalin made him irritable        Review of Systems   CONSTITUTIONAL: NEGATIVE for fever, chills, change in weight  ENT/MOUTH: NEGATIVE for ear, mouth and throat problems  RESP: NEGATIVE for significant cough or SOB  CV: NEGATIVE for chest pain, palpitations or peripheral edema  GI: NEGATIVE for nausea, abdominal pain, heartburn, or change in bowel habits  MUSCULOSKELETAL: he fell off a ladder 3 days ago and bruised up his chest and anterior thighs, it is much better today he can breath more comfortably  PSYCHIATRIC: NEGATIVE for changes in mood or affect, he is going to marriage counseling with his wife, they are helping her aging parents and it is very stressful on them      Objective    /80   Pulse 68   Temp 97.8  F (36.6  C) (Temporal)   Wt 198 lb (89.8 kg)   SpO2 98%   BMI 26.85 kg/m    Body mass index is 26.85 kg/m .  Physical Exam    GENERAL: healthy, alert and no distress  NECK: no adenopathy, no asymmetry, masses, or scars and thyroid normal to palpation  RESP: lungs clear to auscultation - no rales, rhonchi or wheezes  CV: regular rate and rhythm, normal S1 S2, no S3 or S4, no murmur, click or rub, no peripheral edema and peripheral pulses strong  PSYCH: mentation appears normal, affect normal/bright            Assessment & Plan     Hyperlipidemia LDL goal <130  Pt will complete test today  - Lipid panel reflex to direct LDL Fasting        Screen for colon cancer  Already orderred    Need for prophylactic vaccination and inoculation against influenza  declined    Screening for diabetes mellitus    - Glucose    Attention deficit hyperactivity disorder (ADHD), unspecified ADHD type  Increased dosage, float visit for vitals and phone visit with me in 3-4 weeks  - dexmethylphenidate (FOCALIN XR) 25 MG 24 hr capsule; Take 1 capsule (25 mg) by mouth daily     BMI:   Estimated body mass index is 26.85 kg/m  as calculated from the following:    Height as of 7/15/20: 6' (1.829 m).    Weight as of this encounter: 198 lb (89.8 kg).   Weight management plan: Discussed healthy diet and exercise guidelines            Return in about 1 month (around 10/15/2020), or if symptoms worsen or fail to improve.    Marilu Gomez PA-C  [facility]

## 2020-09-15 ENCOUNTER — OFFICE VISIT (OUTPATIENT)
Dept: FAMILY MEDICINE | Facility: OTHER | Age: 55
End: 2020-09-15
Payer: COMMERCIAL

## 2020-09-15 VITALS
OXYGEN SATURATION: 98 % | DIASTOLIC BLOOD PRESSURE: 80 MMHG | BODY MASS INDEX: 26.85 KG/M2 | WEIGHT: 198 LBS | TEMPERATURE: 97.8 F | SYSTOLIC BLOOD PRESSURE: 118 MMHG | HEART RATE: 68 BPM

## 2020-09-15 DIAGNOSIS — Z23 NEED FOR PROPHYLACTIC VACCINATION AND INOCULATION AGAINST INFLUENZA: Primary | ICD-10-CM

## 2020-09-15 DIAGNOSIS — F90.9 ATTENTION DEFICIT HYPERACTIVITY DISORDER (ADHD), UNSPECIFIED ADHD TYPE: ICD-10-CM

## 2020-09-15 DIAGNOSIS — Z11.4 SCREENING FOR HIV (HUMAN IMMUNODEFICIENCY VIRUS): ICD-10-CM

## 2020-09-15 DIAGNOSIS — Z87.891 PERSONAL HISTORY OF TOBACCO USE: ICD-10-CM

## 2020-09-15 DIAGNOSIS — E78.5 HYPERLIPIDEMIA LDL GOAL <130: ICD-10-CM

## 2020-09-15 DIAGNOSIS — Z12.11 SCREEN FOR COLON CANCER: ICD-10-CM

## 2020-09-15 DIAGNOSIS — Z13.1 SCREENING FOR DIABETES MELLITUS: ICD-10-CM

## 2020-09-15 LAB
CHOLEST SERPL-MCNC: 184 MG/DL
GLUCOSE SERPL-MCNC: 120 MG/DL (ref 70–99)
HDLC SERPL-MCNC: 74 MG/DL
LDLC SERPL CALC-MCNC: 93 MG/DL
NONHDLC SERPL-MCNC: 110 MG/DL
TRIGL SERPL-MCNC: 85 MG/DL

## 2020-09-15 PROCEDURE — 99213 OFFICE O/P EST LOW 20 MIN: CPT | Performed by: PHYSICIAN ASSISTANT

## 2020-09-15 PROCEDURE — 82947 ASSAY GLUCOSE BLOOD QUANT: CPT | Performed by: PHYSICIAN ASSISTANT

## 2020-09-15 PROCEDURE — 80061 LIPID PANEL: CPT | Performed by: PHYSICIAN ASSISTANT

## 2020-09-15 PROCEDURE — 36415 COLL VENOUS BLD VENIPUNCTURE: CPT | Performed by: PHYSICIAN ASSISTANT

## 2020-09-15 RX ORDER — DEXMETHYLPHENIDATE HYDROCHLORIDE 25 MG/1
25 CAPSULE, EXTENDED RELEASE ORAL DAILY
Qty: 30 CAPSULE | Refills: 0 | Status: SHIPPED | OUTPATIENT
Start: 2020-09-15 | End: 2020-10-12

## 2020-09-15 ASSESSMENT — PAIN SCALES - GENERAL: PAINLEVEL: NO PAIN (1)

## 2020-09-29 ENCOUNTER — APPOINTMENT (OUTPATIENT)
Dept: CT IMAGING | Facility: CLINIC | Age: 55
End: 2020-09-29
Attending: EMERGENCY MEDICINE
Payer: COMMERCIAL

## 2020-09-29 ENCOUNTER — APPOINTMENT (OUTPATIENT)
Dept: ULTRASOUND IMAGING | Facility: CLINIC | Age: 55
End: 2020-09-29
Attending: EMERGENCY MEDICINE
Payer: COMMERCIAL

## 2020-09-29 ENCOUNTER — HOSPITAL ENCOUNTER (EMERGENCY)
Facility: CLINIC | Age: 55
Discharge: HOME | End: 2020-09-29
Attending: EMERGENCY MEDICINE | Admitting: EMERGENCY MEDICINE
Payer: COMMERCIAL

## 2020-09-29 VITALS
BODY MASS INDEX: 26.85 KG/M2 | HEART RATE: 76 BPM | SYSTOLIC BLOOD PRESSURE: 145 MMHG | TEMPERATURE: 97.6 F | HEIGHT: 72 IN | DIASTOLIC BLOOD PRESSURE: 102 MMHG | RESPIRATION RATE: 22 BRPM | OXYGEN SATURATION: 93 %

## 2020-09-29 DIAGNOSIS — R10.9 RIGHT-SIDED ABDOMINAL PAIN OF UNKNOWN ETIOLOGY: ICD-10-CM

## 2020-09-29 LAB
ALBUMIN SERPL-MCNC: 4.5 G/DL (ref 3.4–5)
ALBUMIN UR-MCNC: NEGATIVE MG/DL
ALP SERPL-CCNC: 50 U/L (ref 40–150)
ALT SERPL W P-5'-P-CCNC: 48 U/L (ref 0–70)
ANION GAP SERPL CALCULATED.3IONS-SCNC: 5 MMOL/L (ref 3–14)
APPEARANCE UR: CLEAR
APTT PPP: 25 SEC (ref 22–37)
AST SERPL W P-5'-P-CCNC: 29 U/L (ref 0–45)
BASOPHILS # BLD AUTO: 0 10E9/L (ref 0–0.2)
BASOPHILS NFR BLD AUTO: 0.4 %
BILIRUB SERPL-MCNC: 0.8 MG/DL (ref 0.2–1.3)
BILIRUB UR QL STRIP: NEGATIVE
BUN SERPL-MCNC: 12 MG/DL (ref 7–30)
CALCIUM SERPL-MCNC: 10.1 MG/DL (ref 8.5–10.1)
CHLORIDE SERPL-SCNC: 106 MMOL/L (ref 94–109)
CO2 SERPL-SCNC: 28 MMOL/L (ref 20–32)
COLOR UR AUTO: ABNORMAL
CREAT SERPL-MCNC: 0.92 MG/DL (ref 0.66–1.25)
DIFFERENTIAL METHOD BLD: NORMAL
EOSINOPHIL NFR BLD AUTO: 0.6 %
ERYTHROCYTE [DISTWIDTH] IN BLOOD BY AUTOMATED COUNT: 12.5 % (ref 10–15)
GFR SERPL CREATININE-BSD FRML MDRD: >90 ML/MIN/{1.73_M2}
GLUCOSE SERPL-MCNC: 115 MG/DL (ref 70–99)
GLUCOSE UR STRIP-MCNC: NEGATIVE MG/DL
HCT VFR BLD AUTO: 47.4 % (ref 40–53)
HGB BLD-MCNC: 16 G/DL (ref 13.3–17.7)
HGB UR QL STRIP: NEGATIVE
IMM GRANULOCYTES # BLD: 0 10E9/L (ref 0–0.4)
IMM GRANULOCYTES NFR BLD: 0.2 %
INR PPP: 1.01 (ref 0.86–1.14)
KETONES UR STRIP-MCNC: 20 MG/DL
LACTATE BLD-SCNC: 0.9 MMOL/L (ref 0.7–2)
LEUKOCYTE ESTERASE UR QL STRIP: NEGATIVE
LIPASE SERPL-CCNC: 126 U/L (ref 73–393)
LYMPHOCYTES # BLD AUTO: 1.1 10E9/L (ref 0.8–5.3)
LYMPHOCYTES NFR BLD AUTO: 22.8 %
MCH RBC QN AUTO: 30.2 PG (ref 26.5–33)
MCHC RBC AUTO-ENTMCNC: 33.8 G/DL (ref 31.5–36.5)
MCV RBC AUTO: 90 FL (ref 78–100)
MONOCYTES # BLD AUTO: 0.5 10E9/L (ref 0–1.3)
MONOCYTES NFR BLD AUTO: 10.3 %
NEUTROPHILS # BLD AUTO: 3 10E9/L (ref 1.6–8.3)
NEUTROPHILS NFR BLD AUTO: 65.7 %
NITRATE UR QL: NEGATIVE
NRBC # BLD AUTO: 0 10*3/UL
NRBC BLD AUTO-RTO: 0 /100
PH UR STRIP: 7 PH (ref 5–7)
PLATELET # BLD AUTO: 239 10E9/L (ref 150–450)
POTASSIUM SERPL-SCNC: 3.8 MMOL/L (ref 3.4–5.3)
PROT SERPL-MCNC: 8 G/DL (ref 6.8–8.8)
RBC # BLD AUTO: 5.29 10E12/L (ref 4.4–5.9)
SODIUM SERPL-SCNC: 139 MMOL/L (ref 133–144)
SOURCE: ABNORMAL
SP GR UR STRIP: 1.02 (ref 1–1.03)
UROBILINOGEN UR STRIP-MCNC: 0 MG/DL (ref 0–2)
WBC # BLD AUTO: 4.6 10E9/L (ref 4–11)

## 2020-09-29 PROCEDURE — 80053 COMPREHEN METABOLIC PANEL: CPT | Performed by: EMERGENCY MEDICINE

## 2020-09-29 PROCEDURE — 99285 EMERGENCY DEPT VISIT HI MDM: CPT | Mod: Z6 | Performed by: EMERGENCY MEDICINE

## 2020-09-29 PROCEDURE — 74177 CT ABD & PELVIS W/CONTRAST: CPT

## 2020-09-29 PROCEDURE — 96361 HYDRATE IV INFUSION ADD-ON: CPT | Performed by: EMERGENCY MEDICINE

## 2020-09-29 PROCEDURE — 85610 PROTHROMBIN TIME: CPT | Performed by: EMERGENCY MEDICINE

## 2020-09-29 PROCEDURE — 25000128 H RX IP 250 OP 636: Performed by: EMERGENCY MEDICINE

## 2020-09-29 PROCEDURE — 25000125 ZZHC RX 250: Performed by: EMERGENCY MEDICINE

## 2020-09-29 PROCEDURE — 96375 TX/PRO/DX INJ NEW DRUG ADDON: CPT | Performed by: EMERGENCY MEDICINE

## 2020-09-29 PROCEDURE — 25800030 ZZH RX IP 258 OP 636: Performed by: EMERGENCY MEDICINE

## 2020-09-29 PROCEDURE — 83605 ASSAY OF LACTIC ACID: CPT | Performed by: EMERGENCY MEDICINE

## 2020-09-29 PROCEDURE — 96376 TX/PRO/DX INJ SAME DRUG ADON: CPT | Performed by: EMERGENCY MEDICINE

## 2020-09-29 PROCEDURE — 85025 COMPLETE CBC W/AUTO DIFF WBC: CPT | Performed by: EMERGENCY MEDICINE

## 2020-09-29 PROCEDURE — 76705 ECHO EXAM OF ABDOMEN: CPT

## 2020-09-29 PROCEDURE — 83690 ASSAY OF LIPASE: CPT | Performed by: EMERGENCY MEDICINE

## 2020-09-29 PROCEDURE — 81003 URINALYSIS AUTO W/O SCOPE: CPT | Performed by: EMERGENCY MEDICINE

## 2020-09-29 PROCEDURE — 96374 THER/PROPH/DIAG INJ IV PUSH: CPT | Mod: 59 | Performed by: EMERGENCY MEDICINE

## 2020-09-29 PROCEDURE — 36415 COLL VENOUS BLD VENIPUNCTURE: CPT | Performed by: EMERGENCY MEDICINE

## 2020-09-29 PROCEDURE — 85730 THROMBOPLASTIN TIME PARTIAL: CPT | Performed by: EMERGENCY MEDICINE

## 2020-09-29 PROCEDURE — 99285 EMERGENCY DEPT VISIT HI MDM: CPT | Mod: 25 | Performed by: EMERGENCY MEDICINE

## 2020-09-29 RX ORDER — HYDROMORPHONE HYDROCHLORIDE 1 MG/ML
0.5 INJECTION, SOLUTION INTRAMUSCULAR; INTRAVENOUS; SUBCUTANEOUS
Status: COMPLETED | OUTPATIENT
Start: 2020-09-29 | End: 2020-09-29

## 2020-09-29 RX ORDER — HYDROMORPHONE HYDROCHLORIDE 1 MG/ML
0.5 INJECTION, SOLUTION INTRAMUSCULAR; INTRAVENOUS; SUBCUTANEOUS ONCE
Status: COMPLETED | OUTPATIENT
Start: 2020-09-29 | End: 2020-09-29

## 2020-09-29 RX ORDER — TRAMADOL HYDROCHLORIDE 50 MG/1
50 TABLET ORAL EVERY 6 HOURS PRN
Qty: 10 TABLET | Refills: 0 | Status: SHIPPED | OUTPATIENT
Start: 2020-09-29 | End: 2020-10-02

## 2020-09-29 RX ORDER — IOPAMIDOL 755 MG/ML
500 INJECTION, SOLUTION INTRAVASCULAR ONCE
Status: COMPLETED | OUTPATIENT
Start: 2020-09-29 | End: 2020-09-29

## 2020-09-29 RX ORDER — LORAZEPAM 2 MG/ML
0.5 INJECTION INTRAMUSCULAR
Status: DISCONTINUED | OUTPATIENT
Start: 2020-09-29 | End: 2020-09-29 | Stop reason: HOSPADM

## 2020-09-29 RX ORDER — ONDANSETRON 2 MG/ML
4 INJECTION INTRAMUSCULAR; INTRAVENOUS EVERY 30 MIN PRN
Status: DISCONTINUED | OUTPATIENT
Start: 2020-09-29 | End: 2020-09-29 | Stop reason: HOSPADM

## 2020-09-29 RX ORDER — SODIUM CHLORIDE 9 MG/ML
INJECTION, SOLUTION INTRAVENOUS CONTINUOUS
Status: DISCONTINUED | OUTPATIENT
Start: 2020-09-29 | End: 2020-09-29 | Stop reason: HOSPADM

## 2020-09-29 RX ADMIN — HYDROMORPHONE HYDROCHLORIDE 0.5 MG: 1 INJECTION, SOLUTION INTRAMUSCULAR; INTRAVENOUS; SUBCUTANEOUS at 13:56

## 2020-09-29 RX ADMIN — SODIUM CHLORIDE 1000 ML: 9 INJECTION, SOLUTION INTRAVENOUS at 10:45

## 2020-09-29 RX ADMIN — HYDROMORPHONE HYDROCHLORIDE 0.5 MG: 1 INJECTION, SOLUTION INTRAMUSCULAR; INTRAVENOUS; SUBCUTANEOUS at 13:00

## 2020-09-29 RX ADMIN — SODIUM CHLORIDE: 9 INJECTION, SOLUTION INTRAVENOUS at 13:58

## 2020-09-29 RX ADMIN — IOPAMIDOL 100 ML: 755 INJECTION, SOLUTION INTRAVENOUS at 11:22

## 2020-09-29 RX ADMIN — ONDANSETRON 4 MG: 2 INJECTION INTRAMUSCULAR; INTRAVENOUS at 10:46

## 2020-09-29 RX ADMIN — HYDROMORPHONE HYDROCHLORIDE 0.5 MG: 1 INJECTION, SOLUTION INTRAMUSCULAR; INTRAVENOUS; SUBCUTANEOUS at 11:08

## 2020-09-29 RX ADMIN — HYDROMORPHONE HYDROCHLORIDE 0.5 MG: 1 INJECTION, SOLUTION INTRAMUSCULAR; INTRAVENOUS; SUBCUTANEOUS at 10:49

## 2020-09-29 RX ADMIN — LORAZEPAM 0.5 MG: 2 INJECTION INTRAMUSCULAR; INTRAVENOUS at 10:56

## 2020-09-29 RX ADMIN — SODIUM CHLORIDE 60 ML: 9 INJECTION, SOLUTION INTRAVENOUS at 11:22

## 2020-09-29 NOTE — ED AVS SNAPSHOT
[facility] Emergency Department  [address] [place] [address]  Phone:  [phone]  Fax:  [phone]                                    Rebel Garcia   MRN: [id number]    Department:  [facility] Emergency Department   Date of Visit:  9/29/2020           After Visit Summary Signature Page    I have received my discharge instructions, and my questions have been answered. I have discussed any challenges I see with this plan with the nurse or doctor.    ..........................................................................................................................................  Patient/Patient Representative Signature      ..........................................................................................................................................  Patient Representative Print Name and Relationship to Patient    ..................................................               ................................................  Date                                   Time    ..........................................................................................................................................  Reviewed by Signature/Title    ...................................................              ..............................................  Date                                               Time          22EPIC Rev 08/18

## 2020-09-29 NOTE — DISCHARGE INSTRUCTIONS
Continue to monitor your discomfort.  Recommend trying ibuprofen Tylenol first -if this is not sufficient you to then may take Tramadol as prescribed.  This is a narcotic.  Do not mix with alcohol.  Recommend follow-up with Dr. Yousif Law -general surgeon at [facility] if your symptoms persist.  At that time would consider obtaining a HIDA scan to assess functionality of the gallbladder.    Medical work-up including CT of the chest, CT of the abdomen, CT of the pelvis, ultrasound of the abdomen, detailed urine testing and conference of blood testing identified no specific etiology for your abdominal discomfort.  No sign for any internal injury from recent fall off a ladder.  No sign for kidney stone, appendicitis, intestinal problem, cancer.     Results for orders placed or performed during the hospital encounter of 09/29/20   CT Chest/Abdomen/Pelvis w Contrast     Status: None (Preliminary result)    Narrative    CT CHEST/ABDOMEN/PELVIS WITH CONTRAST 9/29/2020 11:24 AM    CLINICAL HISTORY: Trauma. Question of chest, abdomen, and pelvis  injury. Fell off ladder approximately 12 feet and now has right-sided  lower abdominal pain.    TECHNIQUE: CT scan of the chest, abdomen, and pelvis was performed  following injection of IV contrast. Multiplanar reformats were  obtained. Dose reduction techniques were used.   CONTRAST: Isovue-370,100 mL    COMPARISON: Chest x-ray dated 7/23/2019, CT chest dated 11/14/2017.    FINDINGS:   LUNGS AND PLEURA: Minimal atelectasis is seen in the posterior  bilateral lungs. Lungs are otherwise grossly clear without nodule,  mass, infiltrate, effusion, or pneumothorax.    MEDIASTINUM/AXILLAE: The heart is normal in size. No mediastinal,  hilar, or axillary lymphadenopathy is identified. Visualized portions  of the thyroid are unremarkable. No central pulmonary artery filling  defects to suggest central pulmonary artery embolism. This study was  not optimized for pulmonary artery  evaluation. Thoracic aorta is of  normal caliber and demonstrates no evidence for dissection.    HEPATOBILIARY: There is mild diffuse fatty infiltration of the liver.  Liver and gallbladder otherwise enhance normally. No biliary ductal  dilatation is seen.    PANCREAS: Normal.    SPLEEN: Normal.    ADRENAL GLANDS: Normal.    KIDNEYS/BLADDER: Kidneys enhance normally. No hydronephrosis,  nephrolithiasis, hydroureter or ureteral calculus is identified.  Urinary bladder is grossly normal in appearance.    BOWEL: There are a few scattered diverticuli, most prominently seen in  the sigmoid colon. No pericolonic inflammatory change to suggest acute  diverticulitis. Appendix extends posteriorly, inferiorly and medially  from the cecum and is normal in appearance. Small bowel contains a  small amount of fluid but is otherwise normal in appearance. There is  a small amount of fluid and air in the stomach. Stomach is otherwise  mostly decompressed and unremarkable.    PELVIC ORGANS: Prostate gland is mildly enlarged but otherwise  unremarkable. There are small fat-containing bilateral inguinal  hernias.    ADDITIONAL FINDINGS: No adenopathy, free fluid or free air is seen in  the peritoneal cavity. There is mild nonaneurysmal atherosclerosis.    MUSCULOSKELETAL: There are mild degenerative changes in the  midthoracic spine and in the lower lumbar spine. No acute osseous  fracture or aggressive osseous lesion is seen.      Impression    IMPRESSION:  1.  Diffuse fatty infiltration of the liver.  2.  Small fat-containing bilateral inguinal hernias.  3.  No evidence for acute traumatic injury of the chest, abdomen or  pelvis is identified.   US Abdomen Limited (RUQ)     Status: None (Preliminary result)    Narrative    ULTRASOUND ABDOMEN LIMITED  9/29/2020 3:26 PM     HISTORY: Right upper quadrant abdominal pain.    COMPARISON: CT chest, abdomen and pelvis on 9/29/2020    FINDINGS: Technically challenging exam due to patient's  body habitus  and overlying bowel gas.    Gallbladder: Normal with no cholelithiasis, wall thickening or focal  tenderness.     Bile ducts: CHD is normal diameter. No intrahepatic biliary  dilatation. The distal portion of the common bile duct is obscured by  overlying bowel gas.    Liver: Demonstrate increased parenchymal echogenicity. No focal  hepatic mass visualized.    Pancreas: Partially obscured by overlying bowel gas, but grossly  unremarkable.     Right kidney: No hydronephrosis or shadowing calculi.    Aorta and IVC: Not specifically assessed.       Impression    IMPRESSION:   1. Hepatic stenosis. No focal hepatic mass.  2. No cholelithiasis or sonographic evidence of acute cholecystitis.   CBC with platelets differential     Status: None   Result Value Ref Range    WBC 4.6 4.0 - 11.0 10e9/L    RBC Count 5.29 4.4 - 5.9 10e12/L    Hemoglobin 16.0 13.3 - 17.7 g/dL    Hematocrit 47.4 40.0 - 53.0 %    MCV 90 78 - 100 fl    MCH 30.2 26.5 - 33.0 pg    MCHC 33.8 31.5 - 36.5 g/dL    RDW 12.5 10.0 - 15.0 %    Platelet Count 239 150 - 450 10e9/L    Diff Method Automated Method     % Neutrophils 65.7 %    % Lymphocytes 22.8 %    % Monocytes 10.3 %    % Eosinophils 0.6 %    % Basophils 0.4 %    % Immature Granulocytes 0.2 %    Nucleated RBCs 0 0 /100    Absolute Neutrophil 3.0 1.6 - 8.3 10e9/L    Absolute Lymphocytes 1.1 0.8 - 5.3 10e9/L    Absolute Monocytes 0.5 0.0 - 1.3 10e9/L    Absolute Basophils 0.0 0.0 - 0.2 10e9/L    Abs Immature Granulocytes 0.0 0 - 0.4 10e9/L    Absolute Nucleated RBC 0.0    Comprehensive metabolic panel     Status: Abnormal   Result Value Ref Range    Sodium 139 133 - 144 mmol/L    Potassium 3.8 3.4 - 5.3 mmol/L    Chloride 106 94 - 109 mmol/L    Carbon Dioxide 28 20 - 32 mmol/L    Anion Gap 5 3 - 14 mmol/L    Glucose 115 (H) 70 - 99 mg/dL    Urea Nitrogen 12 7 - 30 mg/dL    Creatinine 0.92 0.66 - 1.25 mg/dL    GFR Estimate >90 >60 mL/min/[1.73_m2]    GFR Estimate If Black >90 >60  mL/min/[1.73_m2]    Calcium 10.1 8.5 - 10.1 mg/dL    Bilirubin Total 0.8 0.2 - 1.3 mg/dL    Albumin 4.5 3.4 - 5.0 g/dL    Protein Total 8.0 6.8 - 8.8 g/dL    Alkaline Phosphatase 50 40 - 150 U/L    ALT 48 0 - 70 U/L    AST 29 0 - 45 U/L   INR     Status: None   Result Value Ref Range    INR 1.01 0.86 - 1.14   Partial thromboplastin time     Status: None   Result Value Ref Range    PTT 25 22 - 37 sec   UA reflex to Microscopic and Culture     Status: Abnormal    Specimen: Unspecified Urine   Result Value Ref Range    Color Urine Straw     Appearance Urine Clear     Glucose Urine Negative NEG^Negative mg/dL    Bilirubin Urine Negative NEG^Negative    Ketones Urine 20 (A) NEG^Negative mg/dL    Specific Gravity Urine 1.020 1.003 - 1.035    Blood Urine Negative NEG^Negative    pH Urine 7.0 5.0 - 7.0 pH    Protein Albumin Urine Negative NEG^Negative mg/dL    Urobilinogen mg/dL 0.0 0.0 - 2.0 mg/dL    Nitrite Urine Negative NEG^Negative    Leukocyte Esterase Urine Negative NEG^Negative    Source Unspecified Urine    Lipase     Status: None   Result Value Ref Range    Lipase 126 73 - 393 U/L   Lactic acid whole blood     Status: None   Result Value Ref Range    Lactic Acid 0.9 0.7 - 2.0 mmol/L

## 2020-09-29 NOTE — ED NOTES
Pt reports pain is climbing, 5/10, He would like to drink some H2O. I will check with MD. He voided 700 ml of clear lc urine. UA sent to lab.

## 2020-09-29 NOTE — ED PROVIDER NOTES
History     Chief Complaint   Patient presents with     Trauma     HPI  Rebel Garcia is a 55 year old male who presents for evaluation of right-sided abdominal pain.  Significant past medical history for GERD, dyslipidemia, generalized anxiety.    Patient reports 9 days ago he was on a ladder.  Ladder slipped and he fell 12 feet landing on the ladder.  He did not seek medical attention.  He was having right-sided chest and abdominal pain.  States the bruising has been getting better as has the pain.  In the last few days and now specifically today he is developed much more intense pain in the right side of his abdomen.  Denies fever, chills, night sweats.  No altered diet.  Normal bowel movements.  No hematuria.  Patient denies neck, mid spine pain.  Is having some mild low back pain.  No radicular symptoms.  Prior abdominal surgery includes incarcerated umbilical hernia.  Currently rates abdominal pain 9/10 intensity.  Worse with movement.    Patient reports the first 3 to 4 days he was very sore for the right side of chest and abdomen after falling but states it was getting much better and the bruising had fully resolved.  He is back to normal activity and then 2 days ago started having more intense right-sided abdominal pain.  Allergies:  Allergies   Allergen Reactions     No Known Allergies        Problem List:    Patient Active Problem List    Diagnosis Date Noted     Hyperlipidemia LDL goal <130 12/06/2018     Priority: Medium     Gastroesophageal reflux disease without esophagitis 12/06/2018     Priority: Medium     Attention deficit hyperactivity disorder (ADHD) 05/01/2014     Priority: Medium     Problem list name updated by automated process. Provider to review       Family history of colon cancer 09/19/2011     Priority: Medium     Family history of prostate cancer 09/19/2011     Priority: Medium     Anxiety 07/09/2009     Priority: Medium     Encounter for sterilization 02/01/2008     Priority:  Medium     Problem list name updated by automated process. Provider to review       Encounter for other general counseling or advice on contraception 2007     Priority: Medium     Diagnosis updated by automated process. Provider to review and confirm.       Pain in joint, lower leg 2001     Priority: Medium        Past Medical History:    Past Medical History:   Diagnosis Date     Gastroesophageal reflux disease without esophagitis 2018     Hyperlipidemia LDL goal <130 2018     Migraine, unspecified, without mention of intractable migraine without mention of status migrainosus      Sleep apnea        Past Surgical History:    Past Surgical History:   Procedure Laterality Date     COLONOSCOPY  2000     COLONOSCOPY  02/16/10     COLONOSCOPY N/A 2015    Procedure: COMBINED COLONOSCOPY, SINGLE OR MULTIPLE BIOPSY/POLYPECTOMY BY BIOPSY;  Surgeon: Dima Sosa MD;  Location:  GI     HC CORRECT KIM BORREGO/DARNELL/JILLIAN  2005    Modified Darnell bunionectomy, right foot.     HC REPAIR UMBILICAL HARDY,5+Y/O, INCARCERATED OR STRANGULATED  6/15/2004     HERNIA REPAIR, INCISIONAL  2006    Incarcerated recurrent ventral hernia.     HERNIORRHAPHY UMBILICAL  3/29/2011    HERNIORRHAPHY UMBILICAL performed by AOLK WESLEY at  OR       Family History:    Family History   Problem Relation Age of Onset     Allergies Father         iodine     Heart Disease Father         heart attack at 59 - CABG at 60     Cerebrovascular Disease Father      Cancer Mother         colon -  at age 56     Cancer Sister         cervix -  at age 38     Neurologic Disorder Sister         CNS bleed x 2 sisters     Prostate Cancer Brother 58     Other Cancer Brother 62        stage 4 liver cancer     Colon Cancer Sister 68       Social History:  Marital Status:   [2]  Social History     Tobacco Use     Smoking status: Former Smoker     Packs/day: 0.75     Years: 18.00      Pack years: 13.50     Types: Cigarettes     Last attempt to quit: 2006     Years since quittin.6     Smokeless tobacco: Never Used   Substance Use Topics     Alcohol use: Yes     Comment: occ     Drug use: No        Medications:    dexmethylphenidate (FOCALIN XR) 25 MG 24 hr capsule  escitalopram (LEXAPRO) 20 MG tablet          Review of Systems   All other systems reviewed and are negative.      Physical Exam   BP: (!) 144/105  Pulse: 85  Temp: 97.6  F (36.4  C)  Resp: 20  Height: 182.9 cm (6')  SpO2: 99 %      Physical Exam  Vitals signs and nursing note reviewed.   Constitutional:       General: He is not in acute distress.     Appearance: He is not diaphoretic.   HENT:      Head: Normocephalic and atraumatic.      Mouth/Throat:      Mouth: Mucous membranes are moist.   Eyes:      Pupils: Pupils are equal, round, and reactive to light.   Cardiovascular:      Rate and Rhythm: Regular rhythm.      Heart sounds: Normal heart sounds.   Pulmonary:      Effort: No respiratory distress.      Breath sounds: Normal breath sounds.   Chest:      Chest wall: No tenderness.   Abdominal:      General: Bowel sounds are normal. There is no distension.      Palpations: Abdomen is soft. There is no mass.      Tenderness: There is abdominal tenderness. There is guarding and rebound. There is no right CVA tenderness or left CVA tenderness.      Hernia: No hernia is present.   Genitourinary:     Penis: Normal.    Musculoskeletal: Normal range of motion.         General: No tenderness or signs of injury.      Cervical back: He exhibits no tenderness.      Thoracic back: He exhibits no tenderness.      Lumbar back: He exhibits no tenderness.   Skin:     General: Skin is warm and dry.      Capillary Refill: Capillary refill takes less than 2 seconds.      Coloration: Skin is not jaundiced.      Findings: No abrasion or laceration.   Neurological:      General: No focal deficit present.      Mental Status: He is alert and  oriented to person, place, and time.   Psychiatric:      Comments: Anxious         ED Course        Procedures                 Trauma Summary Disposition     Patient is trauma admission:  Trauma  Evaluation        Spine  Backboard removal time: Backboard not applied   C-collar and immobilization: not indicated, cleared.  CSpine Clearance: by Nexus Criteria  Full Primary and Secondary survey with appropriate immobilization of spine completed in exam section.     Neuro  GCS at arrival:  Motor 6=Obeys commands   Verbal 5=Oriented   Eye Opening 4=Spontaneous   Total: 15     GCS at disposition: unchanged        Admitting Consultants:  Surgery consult with Kip Law MD at [facility]. Plan: Check of ultrasound upper quadrant, consider HIDA scan and arrange for outpatient follow-up             Results for orders placed or performed during the hospital encounter of 09/29/20 (from the past 24 hour(s))   CBC with platelets differential   Result Value Ref Range    WBC 4.6 4.0 - 11.0 10e9/L    RBC Count 5.29 4.4 - 5.9 10e12/L    Hemoglobin 16.0 13.3 - 17.7 g/dL    Hematocrit 47.4 40.0 - 53.0 %    MCV 90 78 - 100 fl    MCH 30.2 26.5 - 33.0 pg    MCHC 33.8 31.5 - 36.5 g/dL    RDW 12.5 10.0 - 15.0 %    Platelet Count 239 150 - 450 10e9/L    Diff Method Automated Method     % Neutrophils 65.7 %    % Lymphocytes 22.8 %    % Monocytes 10.3 %    % Eosinophils 0.6 %    % Basophils 0.4 %    % Immature Granulocytes 0.2 %    Nucleated RBCs 0 0 /100    Absolute Neutrophil 3.0 1.6 - 8.3 10e9/L    Absolute Lymphocytes 1.1 0.8 - 5.3 10e9/L    Absolute Monocytes 0.5 0.0 - 1.3 10e9/L    Absolute Basophils 0.0 0.0 - 0.2 10e9/L    Abs Immature Granulocytes 0.0 0 - 0.4 10e9/L    Absolute Nucleated RBC 0.0    Comprehensive metabolic panel   Result Value Ref Range    Sodium 139 133 - 144 mmol/L    Potassium 3.8 3.4 - 5.3 mmol/L    Chloride 106 94 - 109 mmol/L    Carbon Dioxide 28 20 - 32 mmol/L    Anion Gap 5 3 - 14 mmol/L    Glucose  115 (H) 70 - 99 mg/dL    Urea Nitrogen 12 7 - 30 mg/dL    Creatinine 0.92 0.66 - 1.25 mg/dL    GFR Estimate >90 >60 mL/min/[1.73_m2]    GFR Estimate If Black >90 >60 mL/min/[1.73_m2]    Calcium 10.1 8.5 - 10.1 mg/dL    Bilirubin Total 0.8 0.2 - 1.3 mg/dL    Albumin 4.5 3.4 - 5.0 g/dL    Protein Total 8.0 6.8 - 8.8 g/dL    Alkaline Phosphatase 50 40 - 150 U/L    ALT 48 0 - 70 U/L    AST 29 0 - 45 U/L   INR   Result Value Ref Range    INR 1.01 0.86 - 1.14   Partial thromboplastin time   Result Value Ref Range    PTT 25 22 - 37 sec   Lipase   Result Value Ref Range    Lipase 126 73 - 393 U/L   CT Chest/Abdomen/Pelvis w Contrast    Narrative    CT CHEST/ABDOMEN/PELVIS WITH CONTRAST 9/29/2020 11:24 AM    CLINICAL HISTORY: Trauma. Question of chest, abdomen, and pelvis  injury. Fell off ladder approximately 12 feet and now has right-sided  lower abdominal pain.    TECHNIQUE: CT scan of the chest, abdomen, and pelvis was performed  following injection of IV contrast. Multiplanar reformats were  obtained. Dose reduction techniques were used.   CONTRAST: Isovue-370,100 mL    COMPARISON: Chest x-ray dated 7/23/2019, CT chest dated 11/14/2017.    FINDINGS:   LUNGS AND PLEURA: Minimal atelectasis is seen in the posterior  bilateral lungs. Lungs are otherwise grossly clear without nodule,  mass, infiltrate, effusion, or pneumothorax.    MEDIASTINUM/AXILLAE: The heart is normal in size. No mediastinal,  hilar, or axillary lymphadenopathy is identified. Visualized portions  of the thyroid are unremarkable. No central pulmonary artery filling  defects to suggest central pulmonary artery embolism. This study was  not optimized for pulmonary artery evaluation. Thoracic aorta is of  normal caliber and demonstrates no evidence for dissection.    HEPATOBILIARY: There is mild diffuse fatty infiltration of the liver.  Liver and gallbladder otherwise enhance normally. No biliary ductal  dilatation is seen.    PANCREAS: Normal.    SPLEEN:  Normal.    ADRENAL GLANDS: Normal.    KIDNEYS/BLADDER: Kidneys enhance normally. No hydronephrosis,  nephrolithiasis, hydroureter or ureteral calculus is identified.  Urinary bladder is grossly normal in appearance.    BOWEL: There are a few scattered diverticuli, most prominently seen in  the sigmoid colon. No pericolonic inflammatory change to suggest acute  diverticulitis. Appendix extends posteriorly, inferiorly and medially  from the cecum and is normal in appearance. Small bowel contains a  small amount of fluid but is otherwise normal in appearance. There is  a small amount of fluid and air in the stomach. Stomach is otherwise  mostly decompressed and unremarkable.    PELVIC ORGANS: Prostate gland is mildly enlarged but otherwise  unremarkable. There are small fat-containing bilateral inguinal  hernias.    ADDITIONAL FINDINGS: No adenopathy, free fluid or free air is seen in  the peritoneal cavity. There is mild nonaneurysmal atherosclerosis.    MUSCULOSKELETAL: There are mild degenerative changes in the  midthoracic spine and in the lower lumbar spine. No acute osseous  fracture or aggressive osseous lesion is seen.      Impression    IMPRESSION:  1.  Diffuse fatty infiltration of the liver.  2.  Small fat-containing bilateral inguinal hernias.  3.  No evidence for acute traumatic injury of the chest, abdomen or  pelvis is identified.   UA reflex to Microscopic and Culture    Specimen: Unspecified Urine   Result Value Ref Range    Color Urine Straw     Appearance Urine Clear     Glucose Urine Negative NEG^Negative mg/dL    Bilirubin Urine Negative NEG^Negative    Ketones Urine 20 (A) NEG^Negative mg/dL    Specific Gravity Urine 1.020 1.003 - 1.035    Blood Urine Negative NEG^Negative    pH Urine 7.0 5.0 - 7.0 pH    Protein Albumin Urine Negative NEG^Negative mg/dL    Urobilinogen mg/dL 0.0 0.0 - 2.0 mg/dL    Nitrite Urine Negative NEG^Negative    Leukocyte Esterase Urine Negative NEG^Negative    Source  Unspecified Urine    Lactic acid whole blood   Result Value Ref Range    Lactic Acid 0.9 0.7 - 2.0 mmol/L   US Abdomen Limited (RUQ)    Narrative    ULTRASOUND ABDOMEN LIMITED  9/29/2020 3:26 PM     HISTORY: Right upper quadrant abdominal pain.    COMPARISON: CT chest, abdomen and pelvis on 9/29/2020    FINDINGS: Technically challenging exam due to patient's body habitus  and overlying bowel gas.    Gallbladder: Normal with no cholelithiasis, wall thickening or focal  tenderness.     Bile ducts: CHD is normal diameter. No intrahepatic biliary  dilatation. The distal portion of the common bile duct is obscured by  overlying bowel gas.    Liver: Demonstrate increased parenchymal echogenicity. No focal  hepatic mass visualized.    Pancreas: Partially obscured by overlying bowel gas, but grossly  unremarkable.     Right kidney: No hydronephrosis or shadowing calculi.    Aorta and IVC: Not specifically assessed.       Impression    IMPRESSION:   1. Hepatic stenosis. No focal hepatic mass.  2. No cholelithiasis or sonographic evidence of acute cholecystitis.       Medications   ondansetron (ZOFRAN) injection 4 mg (has no administration in time range)   HYDROmorphone (PF) (DILAUDID) injection 0.5 mg (has no administration in time range)       Assessments & Plan (with Medical Decision Making)  Rebel is 55 years of age.  Presents with right-sided abdominal pain.  Reports falling off a ladder 9 days ago and landing on the ladder.  States he was very sore with some right-sided chest and abdominal pain that was getting better along with bruising resolving.  In the last few days he is developed intense pain in the right side which is much worse today.  Worse with movement and with breathing.  Rates his pain 9/10 intensity.  With delayed presentation of 9 days but having some acute trauma for 12 foot fall off of ladder we initially proceeded with trauma assessment.  Only finding was some localized pain to the right side of his  abdomen.  It seems to be more in the right lower quadrant than in the right upper quadrant.  He does have guarding and rebound.  Positive peritoneal signs.  Differential diagnosis: Acute intra-abdominal trauma/liver from fall 9 days ago with delayed bleeding versus possible acute medical process such as appendicitis, biliary colic, renal colic.  4:15 PM  Patient is continued having intractable pain of undetermined etiology.  Trauma CT series of chest abdomen pelvis with IV contrast will due to no acute cause further trauma or acute medical cause for his right side abdominal pain.  CBC was normal.  CMP normal.  Lipase 1.  Lactic acid normal.  Urinalysis unremarkable.  Right upper quadrant ultrasound negative.  Discussed care with Kip Cedillo MD on-call for general surgery.  He recommended completion of upper quadrant ultrasound to make sure there was no stones are just not visible on CT.  And suggested follow-up and consideration for HIDA scan.       I have reviewed the nursing notes.    I have reviewed the findings, diagnosis, plan and need for follow up with the patient.      New Prescriptions    No medications on file       Final diagnoses:   Right-sided abdominal pain of unknown etiology       9/29/2020   [facility] EMERGENCY DEPARTMENT     Jamey Montero,   09/29/20 [id number]     Dana Dumas

## 2020-09-29 NOTE — ED TRIAGE NOTES
TRAUMA EVAL: Patient presents with severe RUQ abdominal pain that woke him at 0300 this morning. He reports he fell 12 feet from a ladder a week ago Sunday. He was not seen at that time. Shefali Zavaleta RN

## 2020-09-29 NOTE — ED NOTES
"Patient is reporting he has been having \"blurred vision\" since waking at 0300. He reports no other neurological sx at this time. Shefali Zavaleta RN   "

## 2020-09-30 ENCOUNTER — OFFICE VISIT (OUTPATIENT)
Dept: SURGERY | Facility: CLINIC | Age: 55
End: 2020-09-30
Payer: COMMERCIAL

## 2020-09-30 VITALS
WEIGHT: 188 LBS | DIASTOLIC BLOOD PRESSURE: 80 MMHG | BODY MASS INDEX: 25.47 KG/M2 | HEIGHT: 72 IN | TEMPERATURE: 98 F | SYSTOLIC BLOOD PRESSURE: 130 MMHG

## 2020-09-30 DIAGNOSIS — R10.9 RIGHT SIDED ABDOMINAL PAIN: Primary | ICD-10-CM

## 2020-09-30 PROCEDURE — 99203 OFFICE O/P NEW LOW 30 MIN: CPT | Performed by: SURGERY

## 2020-09-30 ASSESSMENT — MIFFLIN-ST. JEOR: SCORE: 1725.76

## 2020-09-30 NOTE — LETTER
9/30/2020         RE: Rebel Garcia  [id number]  [place] [address]        Dear Colleague,    Thank you for referring your patient, Rebel Garcia, to the [facility]. Please see a copy of my visit note below.    Patient seen in consultation for right sided abdominal pain by [place] [facility]    HPI:  Patient is a 55 year old male who was recently evaluated in the ED after a fall from ladder for right sided abdominal pain. His traumatic evaluation did not reveal any obvious musculoskeletal injury but given his location of pain he was recommended to follow up with general surgery to further workup gallbladder if necessary. He states that the pain is slightly better today but definitely still there. It is just to the right of his umbilicus and is very focal. Mild bruising visible. No changes to his bowel movements or urination.  He has had colonoscopies in the past. Food and drink do not aggravate his symptoms. He has had multiple abdominal wall hernias repaired in the past.    Review Of Systems    Skin: negative  Ears/Nose/Throat: negative  Respiratory: No shortness of breath, dyspnea on exertion, cough, or hemoptysis  Cardiovascular: negative  Gastrointestinal: negative  Genitourinary: negative  Musculoskeletal: negative  Neurologic: negative  Hematologic/Lymphatic/Immunologic: negative  Endocrine: negative      Past Medical History:   Diagnosis Date     Gastroesophageal reflux disease without esophagitis 12/6/2018     Hyperlipidemia LDL goal <130 12/6/2018     Migraine, unspecified, without mention of intractable migraine without mention of status migrainosus      Sleep apnea        Past Surgical History:   Procedure Laterality Date     COLONOSCOPY  2/25/2000     COLONOSCOPY  02/16/10     COLONOSCOPY N/A 7/17/2015    Procedure: COMBINED COLONOSCOPY, SINGLE OR MULTIPLE BIOPSY/POLYPECTOMY BY BIOPSY;  Surgeon: Dima Sosa MD;  Location:  GI      CORRECT  KIM BORREGO/DARNELL/JILLIAN  2005    Modified Darnell bunionectomy, right foot.     HC REPAIR UMBILICAL HARDY,5+Y/O, INCARCERATED OR STRANGULATED  6/15/2004     HERNIA REPAIR, INCISIONAL  2006    Incarcerated recurrent ventral hernia.     HERNIORRHAPHY UMBILICAL  3/29/2011    HERNIORRHAPHY UMBILICAL performed by ALOK WESLEY at [facility]       Family History   Problem Relation Age of Onset     Allergies Father         iodine     Heart Disease Father         heart attack at 59 - CABG at 60     Cerebrovascular Disease Father      Cancer Mother         colon -  at age 56     Cancer Sister         cervix -  at age 38     Neurologic Disorder Sister         CNS bleed x 2 sisters     Prostate Cancer Brother 58     Other Cancer Brother 62        stage 4 liver cancer     Colon Cancer Sister 68       Social History     Socioeconomic History     Marital status:      Spouse name: Not on file     Number of children: 2     Years of education: Not on file     Highest education level: Not on file   Occupational History     Occupation: [employer]     Employer: [employer]   Social Needs     Financial resource strain: Not on file     Food insecurity     Worry: Not on file     Inability: Not on file     Transportation needs     Medical: Not on file     Non-medical: Not on file   Tobacco Use     Smoking status: Former Smoker     Packs/day: 0.75     Years: 18.00     Pack years: 13.50     Types: Cigarettes     Quit date: 2006     Years since quittin.6     Smokeless tobacco: Never Used   Substance and Sexual Activity     Alcohol use: Yes     Comment: occ     Drug use: No     Sexual activity: Yes     Partners: Female     Birth control/protection: Pill     Comment: S.O. on birth control pills   Lifestyle     Physical activity     Days per week: Not on file     Minutes per session: Not on file     Stress: Not on file   Relationships     Social connections     Talks on phone: Not on file      Gets together: Not on file     Attends Episcopal service: Not on file     Active member of club or organization: Not on file     Attends meetings of clubs or organizations: Not on file     Relationship status: Not on file     Intimate partner violence     Fear of current or ex partner: Not on file     Emotionally abused: Not on file     Physically abused: Not on file     Forced sexual activity: Not on file   Other Topics Concern      Service No     Blood Transfusions No     Caffeine Concern Yes     Comment: 2 pots of coffee per day     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 No   Social History Narrative     Not on file       Current Outpatient Medications   Medication Sig Dispense Refill     dexmethylphenidate (FOCALIN XR) 25 MG 24 hr capsule Take 1 capsule (25 mg) by mouth daily 30 capsule 0     escitalopram (LEXAPRO) 20 MG tablet TAKE ONE TABLET BY MOUTH ONCE DAILY 90 tablet 1     traMADol (ULTRAM) 50 MG tablet Take 1 tablet (50 mg) by mouth every 6 hours as needed for severe pain 10 tablet 0       Medications and history reviewed    Physical exam:  Vitals: /80   Temp 98  F (36.7  C) (Temporal)   Ht 1.829 m (6')   Wt 85.3 kg (188 lb)   BMI 25.50 kg/m    BMI= Body mass index is 25.5 kg/m .    Constitutional: Healthy, alert, non-distressed   Head: Normo-cephalic, atraumatic, no lesions, masses or tenderness   Cardiovascular: RRR, no new murmurs, +S1, +S2 heart sounds, no clicks, rubs or gallops   Respiratory: CTAB, no rales, rhonchi or wheezing, equal chest rise, good respiratory effort   Gastrointestinal: Soft, +point tenderness 2cm to the right of the umbilicus, there is some bruising in this  area. No palpable hematoma, no hernia or mass, non distended, negative murphys sign   : Deferred  Musculoskeletal: Moves all extremities, normal  strength, no deformities noted   Skin: No suspicious lesions or rashes   Psychiatric: Mentation appears normal, affect appropriate   Hematologic/Lymphatic/Immunologic: Normal cervical and supraclavicular lymph nodes   Patient able to get up on table without difficulty.    Labs show:  No results found for this or any previous visit (from the past 24 hour(s)).    Imaging shows:  Recent Results (from the past 744 hour(s))   CT Chest/Abdomen/Pelvis w Contrast    Narrative    CT CHEST/ABDOMEN/PELVIS WITH CONTRAST 9/29/2020 11:24 AM    CLINICAL HISTORY: Trauma. Question of chest, abdomen, and pelvis  injury. Fell off ladder approximately 12 feet and now has right-sided  lower abdominal pain.    TECHNIQUE: CT scan of the chest, abdomen, and pelvis was performed  following injection of IV contrast. Multiplanar reformats were  obtained. Dose reduction techniques were used.   CONTRAST: Isovue-370,100 mL    COMPARISON: Chest x-ray dated 7/23/2019, CT chest dated 11/14/2017.    FINDINGS:   LUNGS AND PLEURA: Minimal atelectasis is seen in the posterior  bilateral lungs. Lungs are otherwise grossly clear without nodule,  mass, infiltrate, effusion, or pneumothorax.    MEDIASTINUM/AXILLAE: The heart is normal in size. No mediastinal,  hilar, or axillary lymphadenopathy is identified. Visualized portions  of the thyroid are unremarkable. No central pulmonary artery filling  defects to suggest central pulmonary artery embolism. This study was  not optimized for pulmonary artery evaluation. Thoracic aorta is of  normal caliber and demonstrates no evidence for dissection.    HEPATOBILIARY: There is mild diffuse fatty infiltration of the liver.  Liver and gallbladder otherwise enhance normally. No biliary ductal  dilatation is seen.    PANCREAS: Normal.    SPLEEN: Normal.    ADRENAL  GLANDS: Normal.    KIDNEYS/BLADDER: Kidneys enhance normally. No hydronephrosis,  nephrolithiasis, hydroureter or ureteral calculus is identified.  Urinary bladder is grossly normal in appearance.    BOWEL: There are a few scattered diverticuli, most prominently seen in  the sigmoid colon. No pericolonic inflammatory change to suggest acute  diverticulitis. Appendix extends posteriorly, inferiorly and medially  from the cecum and is normal in appearance. Small bowel contains a  small amount of fluid but is otherwise normal in appearance. There is  a small amount of fluid and air in the stomach. Stomach is otherwise  mostly decompressed and unremarkable.    PELVIC ORGANS: Prostate gland is mildly enlarged but otherwise  unremarkable. There are small fat-containing bilateral inguinal  hernias.    ADDITIONAL FINDINGS: No adenopathy, free fluid or free air is seen in  the peritoneal cavity. There is mild nonaneurysmal atherosclerosis.    MUSCULOSKELETAL: There are mild degenerative changes in the  midthoracic spine and in the lower lumbar spine. No acute osseous  fracture or aggressive osseous lesion is seen.      Impression    IMPRESSION:  1.  Diffuse fatty infiltration of the liver.  2.  Small fat-containing bilateral inguinal hernias.  3.  No evidence for acute traumatic injury of the chest, abdomen or  pelvis is identified.    NICOLE SCHAFFER MD   US Abdomen Limited (RUQ)    Narrative    ULTRASOUND ABDOMEN LIMITED  9/29/2020 3:26 PM     HISTORY: Right upper quadrant abdominal pain.    COMPARISON: CT chest, abdomen and pelvis on 9/29/2020    FINDINGS: Technically challenging exam due to patient's body habitus  and overlying bowel gas.    Gallbladder: Normal with no cholelithiasis, wall thickening or focal  tenderness.     Bile ducts: CHD is normal diameter. No intrahepatic biliary  dilatation. The distal portion of the common bile duct is obscured by  overlying bowel gas.    Liver: Demonstrate increased parenchymal  echogenicity. No focal  hepatic mass visualized.    Pancreas: Partially obscured by overlying bowel gas, but grossly  unremarkable.     Right kidney: No hydronephrosis or shadowing calculi.    Aorta and IVC: Not specifically assessed.       Impression    IMPRESSION:   1. Hepatic stenosis. No focal hepatic mass.  2. No cholelithiasis or sonographic evidence of acute cholecystitis.    ALEC CHANDLER MD         Assessment:     ICD-10-CM    1. Right sided abdominal pain  R10.9      Plan: There is no clear hernia or hematoma in the area. Doubt gallbladder etiology. This is below the rib cage and above the pelvis. Likely muscular tear/strain with subtle hematoma. I do not recommend further gallbladder workup at this time. He was reassured that there did not seem to be any urgent or emergent issue and if this is a musculoskeletal injury it could take weeks to months for healing. He understands.    Ramírez Mackey,         Again, thank you for allowing me to participate in the care of your patient.        Sincerely,        Ramírez Mackey, DO

## 2020-09-30 NOTE — PROGRESS NOTES
Patient seen in consultation for right sided abdominal pain by [place] [facility]    HPI:  Patient is a 55 year old male who was recently evaluated in the ED after a fall from ladder for right sided abdominal pain. His traumatic evaluation did not reveal any obvious musculoskeletal injury but given his location of pain he was recommended to follow up with general surgery to further workup gallbladder if necessary. He states that the pain is slightly better today but definitely still there. It is just to the right of his umbilicus and is very focal. Mild bruising visible. No changes to his bowel movements or urination.  He has had colonoscopies in the past. Food and drink do not aggravate his symptoms. He has had multiple abdominal wall hernias repaired in the past.    Review Of Systems    Skin: negative  Ears/Nose/Throat: negative  Respiratory: No shortness of breath, dyspnea on exertion, cough, or hemoptysis  Cardiovascular: negative  Gastrointestinal: negative  Genitourinary: negative  Musculoskeletal: negative  Neurologic: negative  Hematologic/Lymphatic/Immunologic: negative  Endocrine: negative      Past Medical History:   Diagnosis Date     Gastroesophageal reflux disease without esophagitis 12/6/2018     Hyperlipidemia LDL goal <130 12/6/2018     Migraine, unspecified, without mention of intractable migraine without mention of status migrainosus      Sleep apnea        Past Surgical History:   Procedure Laterality Date     COLONOSCOPY  2/25/2000     COLONOSCOPY  02/16/10     COLONOSCOPY N/A 7/17/2015    Procedure: COMBINED COLONOSCOPY, SINGLE OR MULTIPLE BIOPSY/POLYPECTOMY BY BIOPSY;  Surgeon: Dima Sosa MD;  Location:  GI     HC CORRECT KIM BORREGO/DARNELL/JILLIAN  05/04/2005    Modified Darnell bunionectomy, right foot.     HC REPAIR UMBILICAL HARDY,5+Y/O, INCARCERATED OR STRANGULATED  6/15/2004     HERNIA REPAIR, INCISIONAL  08/25/2006    Incarcerated recurrent ventral hernia.     HERNIORRHAPHY  UMBILICAL  3/29/2011    HERNIORRHAPHY UMBILICAL performed by ALOK WESLEY at [facility]       Family History   Problem Relation Age of Onset     Allergies Father         iodine     Heart Disease Father         heart attack at 59 - CABG at 60     Cerebrovascular Disease Father      Cancer Mother         colon -  at age 56     Cancer Sister         cervix -  at age 38     Neurologic Disorder Sister         CNS bleed x 2 sisters     Prostate Cancer Brother 58     Other Cancer Brother 62        stage 4 liver cancer     Colon Cancer Sister 68       Social History     Socioeconomic History     Marital status:      Spouse name: Not on file     Number of children: 2     Years of education: Not on file     Highest education level: Not on file   Occupational History     Occupation: [employer]     Employer: [employer]   Social Needs     Financial resource strain: Not on file     Food insecurity     Worry: Not on file     Inability: Not on file     Transportation needs     Medical: Not on file     Non-medical: Not on file   Tobacco Use     Smoking status: Former Smoker     Packs/day: 0.75     Years: 18.00     Pack years: 13.50     Types: Cigarettes     Quit date: 2006     Years since quittin.6     Smokeless tobacco: Never Used   Substance and Sexual Activity     Alcohol use: Yes     Comment: occ     Drug use: No     Sexual activity: Yes     Partners: Female     Birth control/protection: Pill     Comment: S.O. on birth control pills   Lifestyle     Physical activity     Days per week: Not on file     Minutes per session: Not on file     Stress: Not on file   Relationships     Social connections     Talks on phone: Not on file     Gets together: Not on file     Attends [employer] service: Not on file     Active member of club or organization: Not on file     Attends meetings of clubs or organizations: Not on file     Relationship status: Not on file     Intimate partner violence     Fear of  current or ex partner: Not on file     Emotionally abused: Not on file     Physically abused: Not on file     Forced sexual activity: Not on file   Other Topics Concern      Service No     Blood Transfusions No     Caffeine Concern Yes     Comment: 2 pots of coffee per day     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 No   Social History Narrative     Not on file       Current Outpatient Medications   Medication Sig Dispense Refill     dexmethylphenidate (FOCALIN XR) 25 MG 24 hr capsule Take 1 capsule (25 mg) by mouth daily 30 capsule 0     escitalopram (LEXAPRO) 20 MG tablet TAKE ONE TABLET BY MOUTH ONCE DAILY 90 tablet 1     traMADol (ULTRAM) 50 MG tablet Take 1 tablet (50 mg) by mouth every 6 hours as needed for severe pain 10 tablet 0       Medications and history reviewed    Physical exam:  Vitals: /80   Temp 98  F (36.7  C) (Temporal)   Ht 1.829 m (6')   Wt 85.3 kg (188 lb)   BMI 25.50 kg/m    BMI= Body mass index is 25.5 kg/m .    Constitutional: Healthy, alert, non-distressed   Head: Normo-cephalic, atraumatic, no lesions, masses or tenderness   Cardiovascular: RRR, no new murmurs, +S1, +S2 heart sounds, no clicks, rubs or gallops   Respiratory: CTAB, no rales, rhonchi or wheezing, equal chest rise, good respiratory effort   Gastrointestinal: Soft, +point tenderness 2cm to the right of the umbilicus, there is some bruising in this area. No palpable hematoma, no hernia or mass, non distended, negative murphys sign   : Deferred  Musculoskeletal: Moves all extremities, normal  strength, no deformities noted   Skin: No suspicious lesions or rashes   Psychiatric: Mentation appears normal,  affect appropriate   Hematologic/Lymphatic/Immunologic: Normal cervical and supraclavicular lymph nodes   Patient able to get up on table without difficulty.    Labs show:  No results found for this or any previous visit (from the past 24 hour(s)).    Imaging shows:  Recent Results (from the past 744 hour(s))   CT Chest/Abdomen/Pelvis w Contrast    Narrative    CT CHEST/ABDOMEN/PELVIS WITH CONTRAST 9/29/2020 11:24 AM    CLINICAL HISTORY: Trauma. Question of chest, abdomen, and pelvis  injury. Fell off ladder approximately 12 feet and now has right-sided  lower abdominal pain.    TECHNIQUE: CT scan of the chest, abdomen, and pelvis was performed  following injection of IV contrast. Multiplanar reformats were  obtained. Dose reduction techniques were used.   CONTRAST: Isovue-370,100 mL    COMPARISON: Chest x-ray dated 7/23/2019, CT chest dated 11/14/2017.    FINDINGS:   LUNGS AND PLEURA: Minimal atelectasis is seen in the posterior  bilateral lungs. Lungs are otherwise grossly clear without nodule,  mass, infiltrate, effusion, or pneumothorax.    MEDIASTINUM/AXILLAE: The heart is normal in size. No mediastinal,  hilar, or axillary lymphadenopathy is identified. Visualized portions  of the thyroid are unremarkable. No central pulmonary artery filling  defects to suggest central pulmonary artery embolism. This study was  not optimized for pulmonary artery evaluation. Thoracic aorta is of  normal caliber and demonstrates no evidence for dissection.    HEPATOBILIARY: There is mild diffuse fatty infiltration of the liver.  Liver and gallbladder otherwise enhance normally. No biliary ductal  dilatation is seen.    PANCREAS: Normal.    SPLEEN: Normal.    ADRENAL GLANDS: Normal.    KIDNEYS/BLADDER: Kidneys enhance normally. No hydronephrosis,  nephrolithiasis, hydroureter or ureteral calculus is identified.  Urinary bladder is grossly normal in appearance.    BOWEL: There are a few scattered diverticuli, most prominently seen  in  the sigmoid colon. No pericolonic inflammatory change to suggest acute  diverticulitis. Appendix extends posteriorly, inferiorly and medially  from the cecum and is normal in appearance. Small bowel contains a  small amount of fluid but is otherwise normal in appearance. There is  a small amount of fluid and air in the stomach. Stomach is otherwise  mostly decompressed and unremarkable.    PELVIC ORGANS: Prostate gland is mildly enlarged but otherwise  unremarkable. There are small fat-containing bilateral inguinal  hernias.    ADDITIONAL FINDINGS: No adenopathy, free fluid or free air is seen in  the peritoneal cavity. There is mild nonaneurysmal atherosclerosis.    MUSCULOSKELETAL: There are mild degenerative changes in the  midthoracic spine and in the lower lumbar spine. No acute osseous  fracture or aggressive osseous lesion is seen.      Impression    IMPRESSION:  1.  Diffuse fatty infiltration of the liver.  2.  Small fat-containing bilateral inguinal hernias.  3.  No evidence for acute traumatic injury of the chest, abdomen or  pelvis is identified.    NICOLE SCHAFFER MD   US Abdomen Limited (RUQ)    Narrative    ULTRASOUND ABDOMEN LIMITED  9/29/2020 3:26 PM     HISTORY: Right upper quadrant abdominal pain.    COMPARISON: CT chest, abdomen and pelvis on 9/29/2020    FINDINGS: Technically challenging exam due to patient's body habitus  and overlying bowel gas.    Gallbladder: Normal with no cholelithiasis, wall thickening or focal  tenderness.     Bile ducts: CHD is normal diameter. No intrahepatic biliary  dilatation. The distal portion of the common bile duct is obscured by  overlying bowel gas.    Liver: Demonstrate increased parenchymal echogenicity. No focal  hepatic mass visualized.    Pancreas: Partially obscured by overlying bowel gas, but grossly  unremarkable.     Right kidney: No hydronephrosis or shadowing calculi.    Aorta and IVC: Not specifically assessed.       Impression    IMPRESSION:   1.  Hepatic stenosis. No focal hepatic mass.  2. No cholelithiasis or sonographic evidence of acute cholecystitis.    ALEC CHANDLER MD         Assessment:     ICD-10-CM    1. Right sided abdominal pain  R10.9      Plan: There is no clear hernia or hematoma in the area. Doubt gallbladder etiology. This is below the rib cage and above the pelvis. Likely muscular tear/strain with subtle hematoma. I do not recommend further gallbladder workup at this time. He was reassured that there did not seem to be any urgent or emergent issue and if this is a musculoskeletal injury it could take weeks to months for healing. He understands.    Ramírez Mackey, DO

## 2020-10-01 ENCOUNTER — MYC MEDICAL ADVICE (OUTPATIENT)
Dept: FAMILY MEDICINE | Facility: OTHER | Age: 55
End: 2020-10-01

## 2020-10-04 ENCOUNTER — TRANSFERRED RECORDS (OUTPATIENT)
Dept: HEALTH INFORMATION MANAGEMENT | Facility: CLINIC | Age: 55
End: 2020-10-04

## 2020-10-08 ENCOUNTER — ALLIED HEALTH/NURSE VISIT (OUTPATIENT)
Dept: FAMILY MEDICINE | Facility: OTHER | Age: 55
End: 2020-10-08
Payer: COMMERCIAL

## 2020-10-08 VITALS — HEART RATE: 80 BPM | DIASTOLIC BLOOD PRESSURE: 86 MMHG | SYSTOLIC BLOOD PRESSURE: 116 MMHG

## 2020-10-08 DIAGNOSIS — Z01.30 BP CHECK: Primary | ICD-10-CM

## 2020-10-08 PROCEDURE — 99207 PR NO CHARGE NURSE ONLY: CPT

## 2020-10-08 NOTE — PROGRESS NOTES
Rebel Garcia is a 55 year old patient who comes in today for a Blood Pressure check.  Initial BP:  /86   Pulse 80      80  Disposition: follow-up as previously indicated by provider    Roxane Oropeza MA
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
## 2020-10-12 ENCOUNTER — OFFICE VISIT (OUTPATIENT)
Dept: FAMILY MEDICINE | Facility: OTHER | Age: 55
End: 2020-10-12
Payer: COMMERCIAL

## 2020-10-12 VITALS
TEMPERATURE: 98.4 F | WEIGHT: 188 LBS | HEIGHT: 72 IN | DIASTOLIC BLOOD PRESSURE: 80 MMHG | OXYGEN SATURATION: 97 % | RESPIRATION RATE: 16 BRPM | HEART RATE: 74 BPM | SYSTOLIC BLOOD PRESSURE: 112 MMHG | BODY MASS INDEX: 25.47 KG/M2

## 2020-10-12 DIAGNOSIS — F90.9 ATTENTION DEFICIT HYPERACTIVITY DISORDER (ADHD), UNSPECIFIED ADHD TYPE: ICD-10-CM

## 2020-10-12 DIAGNOSIS — K25.9 GASTRIC ULCER WITHOUT HEMORRHAGE OR PERFORATION, UNSPECIFIED CHRONICITY: Primary | ICD-10-CM

## 2020-10-12 DIAGNOSIS — Z80.42 FAMILY HISTORY OF PROSTATE CANCER: ICD-10-CM

## 2020-10-12 DIAGNOSIS — F41.9 ANXIETY: ICD-10-CM

## 2020-10-12 PROCEDURE — 96127 BRIEF EMOTIONAL/BEHAV ASSMT: CPT | Performed by: PHYSICIAN ASSISTANT

## 2020-10-12 PROCEDURE — 99214 OFFICE O/P EST MOD 30 MIN: CPT | Performed by: PHYSICIAN ASSISTANT

## 2020-10-12 RX ORDER — DEXMETHYLPHENIDATE HYDROCHLORIDE 25 MG/1
25 CAPSULE, EXTENDED RELEASE ORAL DAILY
Qty: 30 CAPSULE | Refills: 0 | Status: SHIPPED | OUTPATIENT
Start: 2020-10-12 | End: 2020-10-14

## 2020-10-12 RX ORDER — PANTOPRAZOLE SODIUM 40 MG/1
40 TABLET, DELAYED RELEASE ORAL
COMMUNITY
Start: 2020-10-05 | End: 2020-11-30

## 2020-10-12 RX ORDER — HYDROXYZINE HYDROCHLORIDE 25 MG/1
25-50 TABLET, FILM COATED ORAL 3 TIMES DAILY PRN
Qty: 60 TABLET | Refills: 1 | Status: SHIPPED | OUTPATIENT
Start: 2020-10-12 | End: 2021-05-24

## 2020-10-12 RX ORDER — ESCITALOPRAM OXALATE 20 MG/1
20 TABLET ORAL DAILY
Qty: 90 TABLET | Refills: 1 | Status: SHIPPED | OUTPATIENT
Start: 2020-10-12 | End: 2021-01-15

## 2020-10-12 ASSESSMENT — MIFFLIN-ST. JEOR: SCORE: 1725.76

## 2020-10-12 NOTE — LETTER
October 12, 2020      Rebel Garcia  [id number] [facility] [address]        To Whom It May Concern:    Rebel Garcia  was seen on October 12, 2020 . He may return to work on Oct. 19,2020.      Sincerely,        Marilu Gomez PA-C

## 2020-10-13 ASSESSMENT — ANXIETY QUESTIONNAIRES
1. FEELING NERVOUS, ANXIOUS, OR ON EDGE: NEARLY EVERY DAY
6. BECOMING EASILY ANNOYED OR IRRITABLE: MORE THAN HALF THE DAYS
3. WORRYING TOO MUCH ABOUT DIFFERENT THINGS: MORE THAN HALF THE DAYS
2. NOT BEING ABLE TO STOP OR CONTROL WORRYING: NEARLY EVERY DAY
5. BEING SO RESTLESS THAT IT IS HARD TO SIT STILL: SEVERAL DAYS
IF YOU CHECKED OFF ANY PROBLEMS ON THIS QUESTIONNAIRE, HOW DIFFICULT HAVE THESE PROBLEMS MADE IT FOR YOU TO DO YOUR WORK, TAKE CARE OF THINGS AT HOME, OR GET ALONG WITH OTHER PEOPLE: SOMEWHAT DIFFICULT
GAD7 TOTAL SCORE: 13
7. FEELING AFRAID AS IF SOMETHING AWFUL MIGHT HAPPEN: SEVERAL DAYS

## 2020-10-13 ASSESSMENT — PATIENT HEALTH QUESTIONNAIRE - PHQ9: 5. POOR APPETITE OR OVEREATING: SEVERAL DAYS

## 2020-10-14 ENCOUNTER — MYC MEDICAL ADVICE (OUTPATIENT)
Dept: FAMILY MEDICINE | Facility: OTHER | Age: 55
End: 2020-10-14

## 2020-10-14 DIAGNOSIS — F90.9 ATTENTION DEFICIT HYPERACTIVITY DISORDER (ADHD), UNSPECIFIED ADHD TYPE: ICD-10-CM

## 2020-10-14 RX ORDER — DEXMETHYLPHENIDATE HYDROCHLORIDE 25 MG/1
25 CAPSULE, EXTENDED RELEASE ORAL DAILY
Qty: 30 CAPSULE | Refills: 0 | Status: SHIPPED | OUTPATIENT
Start: 2020-10-14 | End: 2020-11-10

## 2020-10-14 ASSESSMENT — ANXIETY QUESTIONNAIRES: GAD7 TOTAL SCORE: 13

## 2020-10-14 NOTE — TELEPHONE ENCOUNTER
Per chart last fill on 10/12/2020 # 30 sent to  in [place]. Patient is wanting alternative pharmacy. See [employer] message. Please advise    Janel Zamora RN

## 2020-10-26 ENCOUNTER — MYC MEDICAL ADVICE (OUTPATIENT)
Dept: FAMILY MEDICINE | Facility: OTHER | Age: 55
End: 2020-10-26

## 2020-11-02 ENCOUNTER — MYC MEDICAL ADVICE (OUTPATIENT)
Dept: FAMILY MEDICINE | Facility: OTHER | Age: 55
End: 2020-11-02

## 2020-11-03 ENCOUNTER — HOSPITAL ENCOUNTER (EMERGENCY)
Facility: CLINIC | Age: 55
Discharge: HOME | End: 2020-11-03
Attending: PHYSICIAN ASSISTANT | Admitting: PHYSICIAN ASSISTANT
Payer: COMMERCIAL

## 2020-11-03 ENCOUNTER — APPOINTMENT (OUTPATIENT)
Dept: CT IMAGING | Facility: CLINIC | Age: 55
End: 2020-11-03
Attending: PHYSICIAN ASSISTANT
Payer: COMMERCIAL

## 2020-11-03 VITALS
BODY MASS INDEX: 25.66 KG/M2 | TEMPERATURE: 98.6 F | OXYGEN SATURATION: 99 % | DIASTOLIC BLOOD PRESSURE: 78 MMHG | WEIGHT: 189.2 LBS | RESPIRATION RATE: 16 BRPM | HEART RATE: 72 BPM | SYSTOLIC BLOOD PRESSURE: 140 MMHG

## 2020-11-03 DIAGNOSIS — R10.9 BILATERAL FLANK PAIN: ICD-10-CM

## 2020-11-03 LAB
ALBUMIN SERPL-MCNC: 4 G/DL (ref 3.4–5)
ALBUMIN UR-MCNC: NEGATIVE MG/DL
ALP SERPL-CCNC: 57 U/L (ref 40–150)
ALT SERPL W P-5'-P-CCNC: 36 U/L (ref 0–70)
ANION GAP SERPL CALCULATED.3IONS-SCNC: 6 MMOL/L (ref 3–14)
APPEARANCE UR: CLEAR
AST SERPL W P-5'-P-CCNC: 31 U/L (ref 0–45)
BACTERIA #/AREA URNS HPF: ABNORMAL /HPF
BASOPHILS # BLD AUTO: 0 10E9/L (ref 0–0.2)
BASOPHILS NFR BLD AUTO: 0.5 %
BILIRUB SERPL-MCNC: 0.2 MG/DL (ref 0.2–1.3)
BILIRUB UR QL STRIP: NEGATIVE
BUN SERPL-MCNC: 19 MG/DL (ref 7–30)
CALCIUM SERPL-MCNC: 9.1 MG/DL (ref 8.5–10.1)
CHLORIDE SERPL-SCNC: 106 MMOL/L (ref 94–109)
CK SERPL-CCNC: 129 U/L (ref 30–300)
CO2 SERPL-SCNC: 25 MMOL/L (ref 20–32)
COLOR UR AUTO: YELLOW
CREAT SERPL-MCNC: 0.86 MG/DL (ref 0.66–1.25)
CRP SERPL-MCNC: <2.9 MG/L (ref 0–8)
DIFFERENTIAL METHOD BLD: NORMAL
EOSINOPHIL NFR BLD AUTO: 1 %
ERYTHROCYTE [DISTWIDTH] IN BLOOD BY AUTOMATED COUNT: 12.9 % (ref 10–15)
ERYTHROCYTE [SEDIMENTATION RATE] IN BLOOD BY WESTERGREN METHOD: 5 MM/H (ref 0–20)
GFR SERPL CREATININE-BSD FRML MDRD: >90 ML/MIN/{1.73_M2}
GLUCOSE SERPL-MCNC: 100 MG/DL (ref 70–99)
GLUCOSE UR STRIP-MCNC: NEGATIVE MG/DL
HCT VFR BLD AUTO: 48.8 % (ref 40–53)
HGB BLD-MCNC: 16.6 G/DL (ref 13.3–17.7)
HGB UR QL STRIP: NEGATIVE
IMM GRANULOCYTES # BLD: 0 10E9/L (ref 0–0.4)
IMM GRANULOCYTES NFR BLD: 0.2 %
KETONES UR STRIP-MCNC: NEGATIVE MG/DL
LEUKOCYTE ESTERASE UR QL STRIP: ABNORMAL
LYMPHOCYTES # BLD AUTO: 1.2 10E9/L (ref 0.8–5.3)
LYMPHOCYTES NFR BLD AUTO: 28.8 %
MCH RBC QN AUTO: 30.8 PG (ref 26.5–33)
MCHC RBC AUTO-ENTMCNC: 34 G/DL (ref 31.5–36.5)
MCV RBC AUTO: 91 FL (ref 78–100)
MONOCYTES # BLD AUTO: 0.7 10E9/L (ref 0–1.3)
MONOCYTES NFR BLD AUTO: 17.1 %
MUCOUS THREADS #/AREA URNS LPF: PRESENT /LPF
NEUTROPHILS # BLD AUTO: 2.2 10E9/L (ref 1.6–8.3)
NEUTROPHILS NFR BLD AUTO: 52.4 %
NITRATE UR QL: NEGATIVE
NRBC # BLD AUTO: 0 10*3/UL
NRBC BLD AUTO-RTO: 0 /100
PH UR STRIP: 5 PH (ref 5–7)
PLATELET # BLD AUTO: 201 10E9/L (ref 150–450)
POTASSIUM SERPL-SCNC: 4.1 MMOL/L (ref 3.4–5.3)
PROT SERPL-MCNC: 7.6 G/DL (ref 6.8–8.8)
RBC # BLD AUTO: 5.39 10E12/L (ref 4.4–5.9)
RBC #/AREA URNS AUTO: 1 /HPF (ref 0–2)
SODIUM SERPL-SCNC: 137 MMOL/L (ref 133–144)
SOURCE: ABNORMAL
SP GR UR STRIP: 1.02 (ref 1–1.03)
SQUAMOUS #/AREA URNS AUTO: 2 /HPF (ref 0–1)
UROBILINOGEN UR STRIP-MCNC: 0 MG/DL (ref 0–2)
WBC # BLD AUTO: 4.1 10E9/L (ref 4–11)
WBC #/AREA URNS AUTO: 3 /HPF (ref 0–5)

## 2020-11-03 PROCEDURE — 99285 EMERGENCY DEPT VISIT HI MDM: CPT | Performed by: PHYSICIAN ASSISTANT

## 2020-11-03 PROCEDURE — 85025 COMPLETE CBC W/AUTO DIFF WBC: CPT | Performed by: PHYSICIAN ASSISTANT

## 2020-11-03 PROCEDURE — 74176 CT ABD & PELVIS W/O CONTRAST: CPT

## 2020-11-03 PROCEDURE — 258N000003 HC RX IP 258 OP 636: Performed by: PHYSICIAN ASSISTANT

## 2020-11-03 PROCEDURE — 82550 ASSAY OF CK (CPK): CPT | Performed by: PHYSICIAN ASSISTANT

## 2020-11-03 PROCEDURE — 250N000011 HC RX IP 250 OP 636: Performed by: PHYSICIAN ASSISTANT

## 2020-11-03 PROCEDURE — 96374 THER/PROPH/DIAG INJ IV PUSH: CPT | Performed by: PHYSICIAN ASSISTANT

## 2020-11-03 PROCEDURE — 96361 HYDRATE IV INFUSION ADD-ON: CPT | Performed by: PHYSICIAN ASSISTANT

## 2020-11-03 PROCEDURE — 99285 EMERGENCY DEPT VISIT HI MDM: CPT | Mod: 25 | Performed by: PHYSICIAN ASSISTANT

## 2020-11-03 PROCEDURE — 96376 TX/PRO/DX INJ SAME DRUG ADON: CPT | Performed by: PHYSICIAN ASSISTANT

## 2020-11-03 PROCEDURE — 81001 URINALYSIS AUTO W/SCOPE: CPT | Performed by: PHYSICIAN ASSISTANT

## 2020-11-03 PROCEDURE — 86140 C-REACTIVE PROTEIN: CPT | Performed by: PHYSICIAN ASSISTANT

## 2020-11-03 PROCEDURE — 80053 COMPREHEN METABOLIC PANEL: CPT | Performed by: PHYSICIAN ASSISTANT

## 2020-11-03 PROCEDURE — 85652 RBC SED RATE AUTOMATED: CPT | Performed by: PHYSICIAN ASSISTANT

## 2020-11-03 RX ORDER — CYCLOBENZAPRINE HCL 10 MG
10 TABLET ORAL 3 TIMES DAILY PRN
Qty: 20 TABLET | Refills: 0 | Status: SHIPPED | OUTPATIENT
Start: 2020-11-03 | End: 2020-11-09

## 2020-11-03 RX ORDER — HYDROMORPHONE HYDROCHLORIDE 1 MG/ML
0.5 INJECTION, SOLUTION INTRAMUSCULAR; INTRAVENOUS; SUBCUTANEOUS
Status: DISCONTINUED | OUTPATIENT
Start: 2020-11-03 | End: 2020-11-03 | Stop reason: HOSPADM

## 2020-11-03 RX ADMIN — SODIUM CHLORIDE 1000 ML: 9 INJECTION, SOLUTION INTRAVENOUS at 14:15

## 2020-11-03 RX ADMIN — HYDROMORPHONE HYDROCHLORIDE 0.5 MG: 1 INJECTION, SOLUTION INTRAMUSCULAR; INTRAVENOUS; SUBCUTANEOUS at 15:27

## 2020-11-03 RX ADMIN — HYDROMORPHONE HYDROCHLORIDE 0.5 MG: 1 INJECTION, SOLUTION INTRAMUSCULAR; INTRAVENOUS; SUBCUTANEOUS at 14:21

## 2020-11-03 NOTE — ED TRIAGE NOTES
Pt presents with severe bilateral flank pain. Pt states started one week ago but worsening today. Dark urine. Pt was seen at [facility] just PTA.Pt had urine and blood drawn but told pt it would be 3 hour wait.Pt states wanted to come here.

## 2020-11-03 NOTE — DISCHARGE INSTRUCTIONS
Overall your work-up today was very reassuring.  I am not finding a clear cause for your pain at this time.  Try the Flexeril which is a muscle relaxer to see if that will help with some of your discomfort.  Continue with Tylenol as needed.  Contact your clinic provider to schedule follow-up this week for reassessment.  Your CT scan showed your prostate gland was enlarged which can cause some urinary retention issues which may cause some discomfort in the kidneys.  As discussed if you develop any worsening symptoms please return to the emergency department.    Thank you for choosing [facility]'s Emergency Department. It was a pleasure taking care of you today. If you have any questions, please call [phone].    Carie Mathews PA-C

## 2020-11-03 NOTE — ED AVS SNAPSHOT
[facility] Emergency Dept  [address] [place] [address]  Phone: [phone]  Fax: [phone]                                    Rebel Garcia   MRN: [id number]    Department: [facility] Emergency Dept   Date of Visit: 11/3/2020           After Visit Summary Signature Page    I have received my discharge instructions, and my questions have been answered. I have discussed any challenges I see with this plan with the nurse or doctor.    ..........................................................................................................................................  Patient/Patient Representative Signature      ..........................................................................................................................................  Patient Representative Print Name and Relationship to Patient    ..................................................               ................................................  Date                                   Time    ..........................................................................................................................................  Reviewed by Signature/Title    ...................................................              ..............................................  Date                                               Time          22EPIC Rev 08/18

## 2020-11-06 ENCOUNTER — OFFICE VISIT (OUTPATIENT)
Dept: FAMILY MEDICINE | Facility: OTHER | Age: 55
End: 2020-11-06
Payer: COMMERCIAL

## 2020-11-06 VITALS
WEIGHT: 190.2 LBS | TEMPERATURE: 97.6 F | DIASTOLIC BLOOD PRESSURE: 72 MMHG | HEART RATE: 68 BPM | BODY MASS INDEX: 25.76 KG/M2 | HEIGHT: 72 IN | RESPIRATION RATE: 16 BRPM | SYSTOLIC BLOOD PRESSURE: 120 MMHG

## 2020-11-06 DIAGNOSIS — R39.14 BENIGN PROSTATIC HYPERPLASIA WITH INCOMPLETE BLADDER EMPTYING: Primary | ICD-10-CM

## 2020-11-06 DIAGNOSIS — Z12.5 SCREENING FOR PROSTATE CANCER: ICD-10-CM

## 2020-11-06 DIAGNOSIS — N40.1 BENIGN PROSTATIC HYPERPLASIA WITH INCOMPLETE BLADDER EMPTYING: Primary | ICD-10-CM

## 2020-11-06 LAB — PSA SERPL-ACNC: 0.87 UG/L (ref 0–4)

## 2020-11-06 PROCEDURE — G0103 PSA SCREENING: HCPCS | Performed by: PHYSICIAN ASSISTANT

## 2020-11-06 RX ORDER — TAMSULOSIN HYDROCHLORIDE 0.4 MG/1
0.4 CAPSULE ORAL AT BEDTIME
Qty: 30 CAPSULE | Refills: 5 | Status: SHIPPED | OUTPATIENT
Start: 2020-11-06 | End: 2021-01-06 | Stop reason: SINTOL

## 2020-11-06 ASSESSMENT — MIFFLIN-ST. JEOR: SCORE: 1735.74

## 2020-11-06 ASSESSMENT — PAIN SCALES - GENERAL: PAINLEVEL: NO PAIN (0)

## 2020-11-06 NOTE — PATIENT INSTRUCTIONS
You have a slightly enlarged prostate but no abnormal nodules.  I recommend a medication called Flomax to help with the urinary symptoms.  Avoid drinking within 2 hours of going to bed.  Will check a PSA today given your urinary symptoms and family history of prostate cancer.  Follow up as needed.

## 2020-11-06 NOTE — PROGRESS NOTES
Subjective     Rebel Garcia is a 55 year old male who presents to clinic today for the following health issues:    HPI     ED/UC Followup:    Facility:  [facility]  Date of visit: 11/03/2020  Reason for visit: Flank Pain  Current Status: Patient was told he had an enlarged prostate. He is here to follow p on the next steps.        He was seen in the ED for flank pain a few days ago and had a normal work up although CT revealed an enlarged prostate so he would like to discuss this further. He does have nocturia x 1 and sometimes has difficulty completely emptying his bladder. There is a family history of prostate cancer in his brother. His flank pain has completely resolved.     ED Summary  Assessments & Plan (with Medical Decision Making)  Rebel Garcia is a 55 year old male who presented to the ED complaining of bilateral flank pain.  Ongoing for the last few weeks but worsening in the last few days.  Associated dark urine but no fevers, nausea/vomiting.  On arrival to the ED he was afebrile with unremarkable vital signs.  Exam demonstrated bilateral CVA tenderness.  He also had tenderness to the thoracic and lumbar paraspinous musculature into the latissimus dorsi.  With some muscular tenderness and report of dark-colored urine I did have some concern with possible rhabdomyolysis.  IV was established and labs are drawn.  He was given fluids and Dilaudid here with good relief of symptoms.  Labs reviewed and were unremarkable, white count and hemoglobin within normal limits with negative CRP.  His CK was normal at 129 and CMP unremarkable, bilirubin normal.  Urinalysis demonstrated no significant amount of blood, infectious signs, or any other significant abnormalities.  We did do a CT scan to evaluate for obstructive process such as renal stone and this was also negative.  No evidence of hydronephrosis.  I did mention that he had a prominent prostate gland which could be contributing to some of his increased  urination with decreased amounts.  He denied any pain with bowel movements and no fever or leukocytosis to suggest infection of prostate gland.  I did go over all of these results with the patient and his significant other.  I am not finding a clear cause for his symptoms however work-up today very reassuring.  Discussed possibility of musculoskeletal cause for pain.  He reports that it feels much deeper than that but was willing to try muscle relaxers to see if that would help things.  I advised to continue with Tylenol use for pain control as well.  He agrees to contact his clinic provider to schedule close follow-up this week for reassessment.  May benefit from urology referral to discuss enlarged prostate.  I did go over warning signs and symptoms of when to return to the ED.  All questions were answered and patient was discharged home in suitable condition.      Review of Systems   GENERAL: Denies fever, fatigue, weakness, weight gain, or weight loss.  CARDIOVASCULAR: Denies chest pain, shortness of breath, irregular heartbeats,  palpitations, or edema.  RESPIRATORY: Denies cough, hemoptysis, and shortness of breath.  GASTROINTESTINAL: Denies nausea, vomiting, change in appetite, abdominal pain, diarrhea, or constipation.  GENITOURINARY: +Incomplete emptying/decreased stream, nocturia. Denies increased urgency, dysuria, hematuria, or incontinence.          Objective    /72   Pulse 68   Temp 97.6  F (36.4  C) (Temporal)   Resp 16   Ht 1.829 m (6')   Wt 86.3 kg (190 lb 3.2 oz)   BMI 25.80 kg/m    Body mass index is 25.8 kg/m .  Physical Exam   GENERAL: healthy, alert and no distress  RESP: lungs clear to auscultation - no rales, rhonchi or wheezes  CV: regular rate and rhythm, normal S1 S2, no S3 or S4, no murmur, click or rub  RECTAL (male): normal sphincter tone, no rectal masses, prostate minimally enlarged, smooth, nontender without nodules or masses        Assessment & Plan       ICD-10-CM    1.  Benign prostatic hyperplasia with incomplete bladder emptying  N40.1 tamsulosin (FLOMAX) 0.4 MG capsule    R39.14 PSA, screen   2. Screening for prostate cancer  Z12.5 PSA, screen       Recent ED visit for flank pain which has completely resolved but CT revealed a slightly enlarged prostate which was appreciated on exam but no obvious nodules.  He does have some urinary symptoms due to BPH so we discussed medication management and he would like to try Flomax 0.4 mg nightly.  I discussed common side effects and recommend he avoid drinking anything within 2 hours of going to bed.  If symptoms are still not improving, can consider increasing the dose.  Will check a PSA given his prostate enlargement and FH of prostate cancer.   He is in agreement with this plan.      Tyree Mane PA-C  [facility]

## 2020-11-10 ENCOUNTER — MYC MEDICAL ADVICE (OUTPATIENT)
Dept: FAMILY MEDICINE | Facility: OTHER | Age: 55
End: 2020-11-10

## 2020-11-10 DIAGNOSIS — F90.9 ATTENTION DEFICIT HYPERACTIVITY DISORDER (ADHD), UNSPECIFIED ADHD TYPE: ICD-10-CM

## 2020-11-10 RX ORDER — DEXMETHYLPHENIDATE HYDROCHLORIDE 25 MG/1
25 CAPSULE, EXTENDED RELEASE ORAL DAILY
Qty: 30 CAPSULE | Refills: 0 | Status: SHIPPED | OUTPATIENT
Start: 2020-11-10 | End: 2020-12-02

## 2020-11-10 NOTE — TELEPHONE ENCOUNTER
Marilu please review and advise refill. Sent mychart asking what pharmacy.     dexmethylphenidate (FOCALIN XR) 25 MG 24 hr capsule 30 capsule 0 10/14/2020  No   Sig - Route: Take 1 capsule (25 mg) by mouth daily - Oral     Last Seen: 11/06/20  Last Refilled: 10/14/20 30Tablets/Capsules  Refills: 0  Next Visit: CHAD Stone RN  November 10, 2020

## 2020-12-02 ENCOUNTER — MYC MEDICAL ADVICE (OUTPATIENT)
Dept: FAMILY MEDICINE | Facility: OTHER | Age: 55
End: 2020-12-02

## 2020-12-02 ENCOUNTER — MYC REFILL (OUTPATIENT)
Dept: FAMILY MEDICINE | Facility: OTHER | Age: 55
End: 2020-12-02

## 2020-12-02 DIAGNOSIS — F90.9 ATTENTION DEFICIT HYPERACTIVITY DISORDER (ADHD), UNSPECIFIED ADHD TYPE: ICD-10-CM

## 2020-12-02 RX ORDER — DEXMETHYLPHENIDATE HYDROCHLORIDE 25 MG/1
25 CAPSULE, EXTENDED RELEASE ORAL DAILY
Qty: 30 CAPSULE | Refills: 0 | Status: SHIPPED | OUTPATIENT
Start: 2020-12-06 | End: 2021-01-08

## 2020-12-02 RX ORDER — DEXMETHYLPHENIDATE HYDROCHLORIDE 25 MG/1
25 CAPSULE, EXTENDED RELEASE ORAL DAILY
Qty: 30 CAPSULE | Refills: 0 | Status: SHIPPED | OUTPATIENT
Start: 2020-12-06 | End: 2020-12-02

## 2020-12-02 NOTE — TELEPHONE ENCOUNTER
Responded to reginehart.   Marilu please refill medication. Refill encounter in your basket.   Catrachito Stone RN  December 2, 2020

## 2020-12-02 NOTE — TELEPHONE ENCOUNTER
Pending Prescriptions:                       Disp   Refills    dexmethylphenidate (FOCALIN XR) 25 MG 24 h*30 cap*0        Sig: Take 1 capsule (25 mg) by mouth daily        Routing refill request to provider for review/approval because:  Drug not on the [facility] refill protocol   Last Seen: 11/06/20  Last Refilled: 11/10/20 30Tablets/Capsules  Refills: 0  Next Visit: CHAD Stone RN  December 2, 2020

## 2020-12-05 ENCOUNTER — MYC REFILL (OUTPATIENT)
Dept: FAMILY MEDICINE | Facility: OTHER | Age: 55
End: 2020-12-05

## 2020-12-05 DIAGNOSIS — R39.14 BENIGN PROSTATIC HYPERPLASIA WITH INCOMPLETE BLADDER EMPTYING: ICD-10-CM

## 2020-12-05 DIAGNOSIS — N40.1 BENIGN PROSTATIC HYPERPLASIA WITH INCOMPLETE BLADDER EMPTYING: ICD-10-CM

## 2020-12-07 RX ORDER — TAMSULOSIN HYDROCHLORIDE 0.4 MG/1
0.4 CAPSULE ORAL AT BEDTIME
Qty: 30 CAPSULE | Refills: 5 | OUTPATIENT
Start: 2020-12-07

## 2020-12-23 ENCOUNTER — MYC MEDICAL ADVICE (OUTPATIENT)
Dept: FAMILY MEDICINE | Facility: OTHER | Age: 55
End: 2020-12-23

## 2020-12-23 NOTE — TELEPHONE ENCOUNTER
LM for the patient to return call to the clinic.   Please transfer to any triage RN.   Responded to [employer] message.   Catrachito Stone RN  October 21, 2020

## 2020-12-28 ENCOUNTER — E-VISIT (OUTPATIENT)
Dept: FAMILY MEDICINE | Facility: OTHER | Age: 55
End: 2020-12-28
Payer: COMMERCIAL

## 2020-12-28 DIAGNOSIS — F41.9 ANXIETY: Primary | ICD-10-CM

## 2020-12-28 PROCEDURE — 99421 OL DIG E/M SVC 5-10 MIN: CPT | Performed by: PHYSICIAN ASSISTANT

## 2020-12-28 ASSESSMENT — PATIENT HEALTH QUESTIONNAIRE - PHQ9
SUM OF ALL RESPONSES TO PHQ QUESTIONS 1-9: 13
10. IF YOU CHECKED OFF ANY PROBLEMS, HOW DIFFICULT HAVE THESE PROBLEMS MADE IT FOR YOU TO DO YOUR WORK, TAKE CARE OF THINGS AT HOME, OR GET ALONG WITH OTHER PEOPLE: NOT DIFFICULT AT ALL
SUM OF ALL RESPONSES TO PHQ QUESTIONS 1-9: 13

## 2020-12-28 ASSESSMENT — ANXIETY QUESTIONNAIRES
7. FEELING AFRAID AS IF SOMETHING AWFUL MIGHT HAPPEN: SEVERAL DAYS
3. WORRYING TOO MUCH ABOUT DIFFERENT THINGS: MORE THAN HALF THE DAYS
1. FEELING NERVOUS, ANXIOUS, OR ON EDGE: MORE THAN HALF THE DAYS
6. BECOMING EASILY ANNOYED OR IRRITABLE: MORE THAN HALF THE DAYS
5. BEING SO RESTLESS THAT IT IS HARD TO SIT STILL: SEVERAL DAYS
GAD7 TOTAL SCORE: 12
4. TROUBLE RELAXING: MORE THAN HALF THE DAYS
2. NOT BEING ABLE TO STOP OR CONTROL WORRYING: MORE THAN HALF THE DAYS
7. FEELING AFRAID AS IF SOMETHING AWFUL MIGHT HAPPEN: SEVERAL DAYS

## 2020-12-29 ASSESSMENT — ANXIETY QUESTIONNAIRES: GAD7 TOTAL SCORE: 12

## 2020-12-29 ASSESSMENT — PATIENT HEALTH QUESTIONNAIRE - PHQ9: SUM OF ALL RESPONSES TO PHQ QUESTIONS 1-9: 13

## 2021-01-06 ENCOUNTER — OFFICE VISIT (OUTPATIENT)
Dept: FAMILY MEDICINE | Facility: OTHER | Age: 56
End: 2021-01-06
Payer: COMMERCIAL

## 2021-01-06 VITALS
TEMPERATURE: 98.2 F | OXYGEN SATURATION: 99 % | BODY MASS INDEX: 25.9 KG/M2 | RESPIRATION RATE: 16 BRPM | WEIGHT: 185 LBS | HEIGHT: 71 IN

## 2021-01-06 DIAGNOSIS — R55 SYNCOPE, UNSPECIFIED SYNCOPE TYPE: Primary | ICD-10-CM

## 2021-01-06 DIAGNOSIS — R39.15 URINARY URGENCY: ICD-10-CM

## 2021-01-06 DIAGNOSIS — R39.14 BENIGN PROSTATIC HYPERPLASIA WITH INCOMPLETE BLADDER EMPTYING: ICD-10-CM

## 2021-01-06 DIAGNOSIS — N40.1 BENIGN PROSTATIC HYPERPLASIA WITH INCOMPLETE BLADDER EMPTYING: ICD-10-CM

## 2021-01-06 LAB
ALBUMIN SERPL-MCNC: 4.2 G/DL (ref 3.4–5)
ALBUMIN UR-MCNC: NEGATIVE MG/DL
ALP SERPL-CCNC: 50 U/L (ref 40–150)
ALT SERPL W P-5'-P-CCNC: 27 U/L (ref 0–70)
ANION GAP SERPL CALCULATED.3IONS-SCNC: 3 MMOL/L (ref 3–14)
APPEARANCE UR: CLEAR
AST SERPL W P-5'-P-CCNC: 16 U/L (ref 0–45)
BASOPHILS # BLD AUTO: 0 10E9/L (ref 0–0.2)
BASOPHILS NFR BLD AUTO: 0.2 %
BILIRUB SERPL-MCNC: 0.7 MG/DL (ref 0.2–1.3)
BILIRUB UR QL STRIP: NEGATIVE
BUN SERPL-MCNC: 18 MG/DL (ref 7–30)
CALCIUM SERPL-MCNC: 9.7 MG/DL (ref 8.5–10.1)
CHLORIDE SERPL-SCNC: 103 MMOL/L (ref 94–109)
CO2 SERPL-SCNC: 31 MMOL/L (ref 20–32)
COLOR UR AUTO: YELLOW
CREAT SERPL-MCNC: 0.98 MG/DL (ref 0.66–1.25)
DIFFERENTIAL METHOD BLD: NORMAL
EOSINOPHIL # BLD AUTO: 0.1 10E9/L (ref 0–0.7)
EOSINOPHIL NFR BLD AUTO: 1.9 %
ERYTHROCYTE [DISTWIDTH] IN BLOOD BY AUTOMATED COUNT: 13.5 % (ref 10–15)
GFR SERPL CREATININE-BSD FRML MDRD: 86 ML/MIN/{1.73_M2}
GLUCOSE SERPL-MCNC: 108 MG/DL (ref 70–99)
GLUCOSE UR STRIP-MCNC: NEGATIVE MG/DL
HCT VFR BLD AUTO: 48.9 % (ref 40–53)
HGB BLD-MCNC: 16.5 G/DL (ref 13.3–17.7)
HGB UR QL STRIP: NEGATIVE
KETONES UR STRIP-MCNC: NEGATIVE MG/DL
LEUKOCYTE ESTERASE UR QL STRIP: NEGATIVE
LYMPHOCYTES # BLD AUTO: 1 10E9/L (ref 0.8–5.3)
LYMPHOCYTES NFR BLD AUTO: 15.8 %
MCH RBC QN AUTO: 30.4 PG (ref 26.5–33)
MCHC RBC AUTO-ENTMCNC: 33.7 G/DL (ref 31.5–36.5)
MCV RBC AUTO: 90 FL (ref 78–100)
MONOCYTES # BLD AUTO: 0.7 10E9/L (ref 0–1.3)
MONOCYTES NFR BLD AUTO: 11.5 %
NEUTROPHILS # BLD AUTO: 4.6 10E9/L (ref 1.6–8.3)
NEUTROPHILS NFR BLD AUTO: 70.6 %
NITRATE UR QL: NEGATIVE
PH UR STRIP: 6 PH (ref 5–7)
PLATELET # BLD AUTO: 204 10E9/L (ref 150–450)
POTASSIUM SERPL-SCNC: 5.1 MMOL/L (ref 3.4–5.3)
PROT SERPL-MCNC: 7.7 G/DL (ref 6.8–8.8)
RBC # BLD AUTO: 5.42 10E12/L (ref 4.4–5.9)
SODIUM SERPL-SCNC: 137 MMOL/L (ref 133–144)
SOURCE: NORMAL
SP GR UR STRIP: >1.03 (ref 1–1.03)
TSH SERPL DL<=0.005 MIU/L-ACNC: 1.08 MU/L (ref 0.4–4)
UROBILINOGEN UR STRIP-ACNC: 0.2 EU/DL (ref 0.2–1)
WBC # BLD AUTO: 6.5 10E9/L (ref 4–11)

## 2021-01-06 PROCEDURE — 99214 OFFICE O/P EST MOD 30 MIN: CPT | Performed by: PHYSICIAN ASSISTANT

## 2021-01-06 PROCEDURE — 93000 ELECTROCARDIOGRAM COMPLETE: CPT | Performed by: PHYSICIAN ASSISTANT

## 2021-01-06 PROCEDURE — 81003 URINALYSIS AUTO W/O SCOPE: CPT | Performed by: PHYSICIAN ASSISTANT

## 2021-01-06 PROCEDURE — 80050 GENERAL HEALTH PANEL: CPT | Performed by: PHYSICIAN ASSISTANT

## 2021-01-06 PROCEDURE — 36415 COLL VENOUS BLD VENIPUNCTURE: CPT | Performed by: PHYSICIAN ASSISTANT

## 2021-01-06 ASSESSMENT — ANXIETY QUESTIONNAIRES
1. FEELING NERVOUS, ANXIOUS, OR ON EDGE: SEVERAL DAYS
2. NOT BEING ABLE TO STOP OR CONTROL WORRYING: SEVERAL DAYS
GAD7 TOTAL SCORE: 4
6. BECOMING EASILY ANNOYED OR IRRITABLE: SEVERAL DAYS
GAD7 TOTAL SCORE: 4
5. BEING SO RESTLESS THAT IT IS HARD TO SIT STILL: NOT AT ALL
7. FEELING AFRAID AS IF SOMETHING AWFUL MIGHT HAPPEN: NOT AT ALL
3. WORRYING TOO MUCH ABOUT DIFFERENT THINGS: SEVERAL DAYS
7. FEELING AFRAID AS IF SOMETHING AWFUL MIGHT HAPPEN: NOT AT ALL
4. TROUBLE RELAXING: NOT AT ALL
GAD7 TOTAL SCORE: 4

## 2021-01-06 ASSESSMENT — PATIENT HEALTH QUESTIONNAIRE - PHQ9
SUM OF ALL RESPONSES TO PHQ QUESTIONS 1-9: 4
10. IF YOU CHECKED OFF ANY PROBLEMS, HOW DIFFICULT HAVE THESE PROBLEMS MADE IT FOR YOU TO DO YOUR WORK, TAKE CARE OF THINGS AT HOME, OR GET ALONG WITH OTHER PEOPLE: NOT DIFFICULT AT ALL
SUM OF ALL RESPONSES TO PHQ QUESTIONS 1-9: 4

## 2021-01-06 ASSESSMENT — MIFFLIN-ST. JEOR: SCORE: 1700.4

## 2021-01-06 NOTE — LETTER
[facility]  [address]  [place] [address]  Phone: [phone]    January 6, 2021        Rebel Garcia  [id number] [facility] [address]          To whom it may concern:    RE: Rebel Garcia    Patient was seen and treated today at our clinic.  Please excuse from work 01/06/21.  Return to work date to be determined after results from tomorrow but do not want patient working with his current symptoms as he could potentially be at higher risk for injury. We will further assess and determine when he can return to work safely and will update as soon as able.     Please contact me for questions or concerns.      Sincerely,        Ranjan Au PA-C

## 2021-01-06 NOTE — PROGRESS NOTES
Assessment & Plan     Syncope, unspecified syncope type  Rebel presents today with episodes of near syncope and a couple of syncopal episodes, his symptoms started 2 weeks ago but have escalated.  He is currently on Flomax which was started in November for BPH found on CT.  He notes no improvement on the medication, see below for recommendations for BPH treatment.  Uncertain if Flomax is contributing or the root cause of his current issues but he has been taking it regularly in the PM and would be odd to suddenly start developing side effects.  His neuro examination did show possible positive Romberg, his EKG showed junctional bradycardia but review of past EKGs does show he has had bradycardia in the past.  He has no acute STchanges. Did obtain lab work up to rule out electrolyte, thyroid, WBC and hemoglobin concerns, no concerns on the lab work-up.  He does have significant family history of cardiac disease and not sure what to make out of the history of sister with brain bleeds if this could have been due to aneurysm or not.  Because of the positive Romberg and his syncopal episodes which have continued despite being off Flomax for > 24 hours elected to obtain MRI/MRA for other central NS concern as well as Echocardiogram to rule out abnormality contributing to the symptoms.  If work-up remains negative he will still follow-up with Urology for BPH symptoms but recommend Cardiac evaluation first and then consider Neurology consult.  For now advised that he be off of work, he should not be elevated or on any ladders, he should try to rest as much as possible to avoid injury due to syncopal episodes.  Advised no driving as well.  Advised ED if worse or other  serious symptoms develop in meantime.   - Comprehensive metabolic panel  - TSH with free T4 reflex  - CBC with platelets differential  - EKG 12-lead complete w/read - Clinics  - MR Brain w/o Contrast; Future  - MRA Brain ([place]) wo Contrast;  Future  - Echocardiogram Complete; Future    Benign prostatic hyperplasia with incomplete bladder emptying  Recommended evaluation with Urology, no response on Flomax, discussed potential other medical treatment but uncertain if his recent symptoms are medication related or another concern.   - UROLOGY ADULT REFERRAL; Future    Urinary urgency  No concerns on UA, no acute UTI symptoms, still urinating.  Likely BPH causing these symptoms.   - *UA reflex to Microscopic  - UROLOGY ADULT REFERRAL; Future    Review of the result(s) of each unique test - CBC, CMP, TSH, UA, pending MRI, MRA, and Echo     40 minutes spent on the date of the encounter doing chart review, review of test results, interpretation of tests, patient visit and documentation       Return in about 3 days (around 1/9/2021) for If not improving, sooner if worse or new concerns, pending work-up.    Options for treatment and follow-up care were reviewed with the patient and/or guardian. Patient and/or guardian engaged in the decision making process and verbalized understanding of the options discussed and agreed with the final plan.    Ranjan Au PA-C  [facility]     Rebel Garcia is a 55 year old who presents to clinic today for the following health issues     History of Present Illness       He eats 2-3 servings of fruits and vegetables daily.He consumes 0 sweetened beverage(s) daily.He exercises with enough effort to increase his heart rate 9 or less minutes per day.  He exercises with enough effort to increase his heart rate 5 days per week.   He is taking medications regularly.     Answers for HPI/ROS submitted by the patient on 1/6/2021   Chronic problems general questions HPI Form  If you checked off any problems, how difficult have these problems made it for you to do your work, take care of things at home, or get along with other people?: Not difficult at all  PHQ9 TOTAL SCORE: 4  LUIS 7 TOTAL SCORE:  4      Genitourinary - Male  Onset/Duration:    Description:   Dysuria (painful urination): no  Hematuria (blood in urine): no  Frequency: YES- urgency though, not actual frequency.   Waking at night to urinate: YES- drinks a lot of water.   Hesitancy (delay in urine): YES  Retention (unable to empty): YES  Decrease in urinary flow: YES  Incontinence: no  Progression of Symptoms:  worsening  Accompanying Signs & Symptoms:  Fever: no  Back/Flank pain: no  Urethral discharge: no  Testicle lumps/masses/pain: no  Nausea and/or vomiting: no  Abdominal pain: no  History:   History of frequent UTI s: no  History of kidney stones: no  History of hernias: YES- a long time ago.   Personal or Family history of Prostate problems: YES- brother has prostate cancer.   Sexually active: YES  Precipitating or alleviating factors: None  Therapies tried and outcome: flomax but noticed no change to the urination, has been on it for a month.     - For the past 2 weeks he has been feeling like he is going to pass out.  It doesn't happen when he is sitting it only happens when he is upright and standing.  It doesn't happen when going through a position change such as laying to sitting or sitting to standing it happens when he is upright and walking already.  Yesterday was very bad. He fell flat on his face at work, everything went dark, the sound gets muted in his ears.  He doesn't think he lost consciousness because he remembers up to the fall and immediately afterwards.  He got up and sat down and then felt better after a bit.   - Feels like it gets quiet, vision starts to close in, lightheadedness, no room spinning sensation.  Was shaky when sitting back up in a chair after the episode, lasts 10 minutes or so. Denies associated chest pain, shortness of breath, palpitations, racing heart.  Sometimes feels like his heart will beat hard once he sits down. He denies headaches, other vision changes (not associated with his syncope),  paresthesias.   - The last two weeks he is waking up soaking wet at night.  But has no daytime fevers or chills.   - FH of CAD (brother and father triple bypass but was a smoker), sister had two brain bleeds, he doesn't know why or what the circumstances were around the bleeds.  He does note he has had imaging of his head in the past.    - No benefits from Flomax. Urinary symptoms are unchanged.  Still has no issues with flank pain but still has problems with urine retention, seems like he can't empty his bladder entirely.  No pain with urination, no blood with urination.  Bowel movements have been fine.    - He has no history of cardiac or neurologic disorders. .   - drinks at least six 20 oz bottles per day and 1 coffee in the AM.     RN Triage 01/05/2021:  Called to discuss further with the patient.      Patient states that he has been taking the Flomax, which he feels has been causing dizziness. The dizziness spells occur intermittently throughout the day. His last episode was an hour ago and it caused him to pass out/faint. This only occurs when the patient is standing, but he feels fine sitting. Patient was alert and orientated during conversation. Did not sound or feel confused. Patient was prescribed medication on 11/6/20  Patient also states that he still feels the urge to urinate. States that it takes a lot of work to get a urine flow started. Patient denies any pain, chest pain, sob, or fevers. Patient had his prostate checked by [facility] on 11/03/20. States his urine is dark yellow in color and there is an odor to the urine.         PLAN:   Huddled with Marilu Gomez.      1. Patient should see urology.   2. Stop the Flomax to see if this decreases the dizziness. However, if the patient is unable to urinate then he needs to contact us immediately.   3. Patient should have a visit with provider to leave a urine sample to check of a UTI or Prostatitis. At that visit a referral should be made for urology for  further evaluation.      Catrachito Stone RN  January 5, 2021    Plan from MILDRED MCNAMARA 11/06/2020:  Assessment & Plan             ICD-10-CM     1. Benign prostatic hyperplasia with incomplete bladder emptying  N40.1 tamsulosin (FLOMAX) 0.4 MG capsule     R39.14 PSA, screen   2. Screening for prostate cancer  Z12.5 PSA, screen         Recent ED visit for flank pain which has completely resolved but CT revealed a slightly enlarged prostate which was appreciated on exam but no obvious nodules.  He does have some urinary symptoms due to BPH so we discussed medication management and he would like to try Flomax 0.4 mg nightly.  I discussed common side effects and recommend he avoid drinking anything within 2 hours of going to bed.  If symptoms are still not improving, can consider increasing the dose.  Will check a PSA given his prostate enlargement and FH of prostate cancer.   He is in agreement with this plan.        Tyree Mane PA-C  [facility]    CT Abdomen/Pelvis 11/03/2020:  CT ABDOMEN PELVIS W/O CONTRAST 11/3/2020 3:19 PM     CLINICAL HISTORY: bilateral flank pain, rule out renal stone or other  pathology causing pain  TECHNIQUE: CT scan of the abdomen and pelvis was performed without IV  contrast. Multiplanar reformats were obtained. Dose reduction  techniques were used.  CONTRAST: None.     COMPARISON: CT 9/29/2020     FINDINGS:   LOWER CHEST: Normal.     HEPATOBILIARY: Hepatic steatosis. Unremarkable gallbladder and bile  ducts.     PANCREAS: Normal.     SPLEEN: Normal.     ADRENAL GLANDS: Normal.     KIDNEYS/BLADDER: Normal.     BOWEL: Normal caliber small bowel. Normal appendix. Colonic  diverticulosis without evidence of acute diverticulitis. No free air  or free fluid.     LYMPH NODES: Normal.     VASCULATURE: Mild atherosclerosis.     PELVIC ORGANS: Small fat-containing right inguinal hernia. Prominent  prostate gland.     OTHER: None.     MUSCULOSKELETAL: Mild spinal degenerative  changes.                                                                      IMPRESSION:   1.  No acute or suspicious findings in the abdomen or pelvis to  explain the patient's symptoms. No urinary tract calculus is  identified and there is no evidence of hydronephrosis.     LISA RASMUSSEN MD    Component      Latest Ref Rng & Units 11/3/2020 11/6/2020   WBC      4.0 - 11.0 10e9/L 4.1    RBC Count      4.4 - 5.9 10e12/L 5.39    Hemoglobin      13.3 - 17.7 g/dL 16.6    Hematocrit      40.0 - 53.0 % 48.8    MCV      78 - 100 fl 91    MCH      26.5 - 33.0 pg 30.8    MCHC      31.5 - 36.5 g/dL 34.0    RDW      10.0 - 15.0 % 12.9    Platelet Count      150 - 450 10e9/L 201    Diff Method       Automated Method    % Neutrophils      % 52.4    % Lymphocytes      % 28.8    % Monocytes      % 17.1    % Eosinophils      % 1.0    % Basophils      % 0.5    % Immature Granulocytes      % 0.2    Nucleated RBCs      0 /100 0    Absolute Neutrophil      1.6 - 8.3 10e9/L 2.2    Absolute Lymphocytes      0.8 - 5.3 10e9/L 1.2    Absolute Monocytes      0.0 - 1.3 10e9/L 0.7    Absolute Basophils      0.0 - 0.2 10e9/L 0.0    Abs Immature Granulocytes      0 - 0.4 10e9/L 0.0    Absolute Nucleated RBC       0.0    Color Urine       Yellow    Appearance Urine       Clear    Glucose Urine      NEG:Negative mg/dL Negative    Bilirubin Urine      NEG:Negative Negative    Ketones Urine      NEG:Negative mg/dL Negative    Specific Gravity Urine      1.003 - 1.035 1.018    Blood Urine      NEG:Negative Negative    pH Urine      5.0 - 7.0 pH 5.0    Protein Albumin Urine      NEG:Negative mg/dL Negative    Urobilinogen mg/dL      0.0 - 2.0 mg/dL 0.0    Nitrite Urine      NEG:Negative Negative    Leukocyte Esterase Urine      NEG:Negative Trace (A)    Source       Midstream Urine    WBC Urine      0 - 5 /HPF 3    RBC Urine      0 - 2 /HPF 1    Bacteria Urine      NEG:Negative /HPF Few (A)    Squamous Epithelial /HPF Urine      0 - 1 /HPF 2  "(H)    Mucous Urine      NEG:Negative /LPF Present (A)    Sodium      133 - 144 mmol/L 137    Potassium      3.4 - 5.3 mmol/L 4.1    Chloride      94 - 109 mmol/L 106    Carbon Dioxide      20 - 32 mmol/L 25    Anion Gap      3 - 14 mmol/L 6    Glucose      70 - 99 mg/dL 100 (H)    Urea Nitrogen      7 - 30 mg/dL 19    Creatinine      0.66 - 1.25 mg/dL 0.86    GFR Estimate      >60 mL/min/1.73:m2 >90    GFR Estimate If Black      >60 mL/min/1.73:m2 >90    Calcium      8.5 - 10.1 mg/dL 9.1    Bilirubin Total      0.2 - 1.3 mg/dL 0.2    Albumin      3.4 - 5.0 g/dL 4.0    Protein Total      6.8 - 8.8 g/dL 7.6    Alkaline Phosphatase      40 - 150 U/L 57    ALT      0 - 70 U/L 36    AST      0 - 45 U/L 31    CRP Inflammation      0.0 - 8.0 mg/L <2.9    Sed Rate      0 - 20 mm/h 5    CK Total      30 - 300 U/L 129    PSA      0 - 4 ug/L  0.87     Review of Systems   Constitutional, HEENT, cardiovascular, pulmonary, GI, , musculoskeletal, neuro, skin, endocrine and psych systems are negative, except as otherwise noted.      Objective    Temp 98.2  F (36.8  C) (Temporal)   Resp 16   Ht 1.81 m (5' 11.26\")   Wt 83.9 kg (185 lb)   SpO2 99%   BMI 25.61 kg/m    Body mass index is 25.61 kg/m .  Physical Exam   GENERAL: healthy, alert and no distress  EYES: Eyes grossly normal to inspection, PERRL and conjunctivae and sclerae normal  HENT: ear canals and TM's normal, nose and mouth without ulcers or lesions  NECK: no adenopathy, no asymmetry, masses, or scars and thyroid normal to palpation  RESP: lungs clear to auscultation - no rales, rhonchi or wheezes  CV: regular rate and rhythm, normal S1 S2, no S3 or S4, no murmur, click or rub, no peripheral edema and peripheral pulses strong  ABDOMEN: soft, nontender, no hepatosplenomegaly, no masses and bowel sounds normal  MS: no gross musculoskeletal defects noted, no edema  SKIN: no suspicious lesions or rashes  NEURO: Normal strength and tone, sensory exam grossly normal, " mentation intact, speech normal, cranial nerves 2-12 intact, DTR's normal and symmetric 1+ biceps, 1+ patellar, gait normal, Romberg Positive and rapid alternating movements normal  PSYCH: mentation appears normal, affect normal/bright    Results for orders placed or performed in visit on 01/06/21 (from the past 24 hour(s))   *UA reflex to Microscopic   Result Value Ref Range    Color Urine Yellow     Appearance Urine Clear     Glucose Urine Negative NEG^Negative mg/dL    Bilirubin Urine Negative NEG^Negative    Ketones Urine Negative NEG^Negative mg/dL    Specific Gravity Urine >1.030 1.003 - 1.035    Blood Urine Negative NEG^Negative    pH Urine 6.0 5.0 - 7.0 pH    Protein Albumin Urine Negative NEG^Negative mg/dL    Urobilinogen Urine 0.2 0.2 - 1.0 EU/dL    Nitrite Urine Negative NEG^Negative    Leukocyte Esterase Urine Negative NEG^Negative    Source Unspecified Urine    Comprehensive metabolic panel   Result Value Ref Range    Sodium 137 133 - 144 mmol/L    Potassium 5.1 3.4 - 5.3 mmol/L    Chloride 103 94 - 109 mmol/L    Carbon Dioxide 31 20 - 32 mmol/L    Anion Gap 3 3 - 14 mmol/L    Glucose 108 (H) 70 - 99 mg/dL    Urea Nitrogen 18 7 - 30 mg/dL    Creatinine 0.98 0.66 - 1.25 mg/dL    GFR Estimate 86 >60 mL/min/[1.73_m2]    GFR Estimate If Black >90 >60 mL/min/[1.73_m2]    Calcium 9.7 8.5 - 10.1 mg/dL    Bilirubin Total 0.7 0.2 - 1.3 mg/dL    Albumin 4.2 3.4 - 5.0 g/dL    Protein Total 7.7 6.8 - 8.8 g/dL    Alkaline Phosphatase 50 40 - 150 U/L    ALT 27 0 - 70 U/L    AST 16 0 - 45 U/L   TSH with free T4 reflex   Result Value Ref Range    TSH 1.08 0.40 - 4.00 mU/L   CBC with platelets differential   Result Value Ref Range    WBC 6.5 4.0 - 11.0 10e9/L    RBC Count 5.42 4.4 - 5.9 10e12/L    Hemoglobin 16.5 13.3 - 17.7 g/dL    Hematocrit 48.9 40.0 - 53.0 %    MCV 90 78 - 100 fl    MCH 30.4 26.5 - 33.0 pg    MCHC 33.7 31.5 - 36.5 g/dL    RDW 13.5 10.0 - 15.0 %    Platelet Count 204 150 - 450 10e9/L    %  Neutrophils 70.6 %    % Lymphocytes 15.8 %    % Monocytes 11.5 %    % Eosinophils 1.9 %    % Basophils 0.2 %    Absolute Neutrophil 4.6 1.6 - 8.3 10e9/L    Absolute Lymphocytes 1.0 0.8 - 5.3 10e9/L    Absolute Monocytes 0.7 0.0 - 1.3 10e9/L    Absolute Eosinophils 0.1 0.0 - 0.7 10e9/L    Absolute Basophils 0.0 0.0 - 0.2 10e9/L    Diff Method Automated Method      EKG: Junctional bradycardia

## 2021-01-07 ENCOUNTER — HOSPITAL ENCOUNTER (OUTPATIENT)
Dept: MRI IMAGING | Facility: CLINIC | Age: 56
End: 2021-01-07
Attending: PHYSICIAN ASSISTANT
Payer: COMMERCIAL

## 2021-01-07 DIAGNOSIS — R55 SYNCOPE, UNSPECIFIED SYNCOPE TYPE: ICD-10-CM

## 2021-01-07 PROCEDURE — 70551 MRI BRAIN STEM W/O DYE: CPT

## 2021-01-07 PROCEDURE — 70544 MR ANGIOGRAPHY HEAD W/O DYE: CPT

## 2021-01-07 ASSESSMENT — ANXIETY QUESTIONNAIRES: GAD7 TOTAL SCORE: 4

## 2021-01-07 ASSESSMENT — PATIENT HEALTH QUESTIONNAIRE - PHQ9: SUM OF ALL RESPONSES TO PHQ QUESTIONS 1-9: 4

## 2021-01-08 ENCOUNTER — MYC REFILL (OUTPATIENT)
Dept: FAMILY MEDICINE | Facility: OTHER | Age: 56
End: 2021-01-08

## 2021-01-08 ENCOUNTER — MYC MEDICAL ADVICE (OUTPATIENT)
Dept: FAMILY MEDICINE | Facility: OTHER | Age: 56
End: 2021-01-08

## 2021-01-08 ENCOUNTER — TELEPHONE (OUTPATIENT)
Dept: FAMILY MEDICINE | Facility: OTHER | Age: 56
End: 2021-01-08

## 2021-01-08 ENCOUNTER — HOSPITAL ENCOUNTER (OUTPATIENT)
Dept: CARDIOLOGY | Facility: CLINIC | Age: 56
Discharge: HOME | End: 2021-01-08
Attending: PHYSICIAN ASSISTANT | Admitting: PHYSICIAN ASSISTANT
Payer: COMMERCIAL

## 2021-01-08 DIAGNOSIS — R55 SYNCOPE, UNSPECIFIED SYNCOPE TYPE: Primary | ICD-10-CM

## 2021-01-08 DIAGNOSIS — F90.9 ATTENTION DEFICIT HYPERACTIVITY DISORDER (ADHD), UNSPECIFIED ADHD TYPE: ICD-10-CM

## 2021-01-08 DIAGNOSIS — R55 SYNCOPE, UNSPECIFIED SYNCOPE TYPE: ICD-10-CM

## 2021-01-08 PROCEDURE — 93306 TTE W/DOPPLER COMPLETE: CPT | Mod: 26 | Performed by: INTERNAL MEDICINE

## 2021-01-08 PROCEDURE — 93306 TTE W/DOPPLER COMPLETE: CPT

## 2021-01-08 NOTE — TELEPHONE ENCOUNTER
Lm to call back- please give both results    Please call patient. His Echo showed normal function for the most part, possibly somewhat thickened area of the heart but hard to see without contrast, no signs of valve issues.  How are his symptoms today? Has he continued to pass out?     Ranjan Au PA-C

## 2021-01-08 NOTE — TELEPHONE ENCOUNTER
I scheduled patient with cardiology 1/15/21 8:00 am video visit.  Instructions will come to his my chart.  Called patient and informed him, also notified him of ES my chart reply.

## 2021-01-08 NOTE — TELEPHONE ENCOUNTER
----- Message from Ranjan Au PA-C sent at 1/8/2021  6:27 AM CST -----  Please call patient. His MRI and MRA images are unremarkable which is good, we will see what his Echo shows today.     Ranjan Au PA-C

## 2021-01-08 NOTE — TELEPHONE ENCOUNTER
Pending Prescriptions:                       Disp   Refills    dexmethylphenidate (FOCALIN XR) 25 MG 24 h*30 cap*0        Sig: Take 1 capsule (25 mg) by mouth daily    Routing refill request to provider for review/approval because:  Drug not on the [facility] refill protocol

## 2021-01-10 ENCOUNTER — HEALTH MAINTENANCE LETTER (OUTPATIENT)
Age: 56
End: 2021-01-10

## 2021-01-12 RX ORDER — DEXMETHYLPHENIDATE HYDROCHLORIDE 25 MG/1
25 CAPSULE, EXTENDED RELEASE ORAL DAILY
Qty: 30 CAPSULE | Refills: 0 | OUTPATIENT
Start: 2021-01-12

## 2021-01-12 RX ORDER — DEXMETHYLPHENIDATE HYDROCHLORIDE 25 MG/1
25 CAPSULE, EXTENDED RELEASE ORAL DAILY
Qty: 30 CAPSULE | Refills: 0 | Status: SHIPPED | OUTPATIENT
Start: 2021-01-12 | End: 2021-05-24

## 2021-01-13 ENCOUNTER — MYC MEDICAL ADVICE (OUTPATIENT)
Dept: FAMILY MEDICINE | Facility: OTHER | Age: 56
End: 2021-01-13

## 2021-01-13 NOTE — TELEPHONE ENCOUNTER
Spoke with patient.  This is the first episode he has had while sitting.     Has had about 5 episodes since he was seen in clinic but they were otherwise all while active.   He just wants to know if video visit is appropriate.  Discussed that the work-up is available to the cardiologist to review and they can make recommendations based on history since he just recently had cardiac exam in clinic along with the other work up.     Continued to advise he be safe since he is still working, he is, he is doing paperwork and bored out of his mind. No other new symptoms just change to symptoms with sitting.      Ranjan Au PA-C

## 2021-01-13 NOTE — TELEPHONE ENCOUNTER
To: [facility] CTA POOL      From: Rebel Garcia      Created: 1/13/2021 8:32 AM        *-*-*This message has not been handled.*-*-*    Still passing out,  I had to pull over on my way to work and have someone else drive me to work.   i have a Video appointment on friday. My question is what is a video appt going to do for me ?        Provider please review and advise.       Gaviota Chin, CMA

## 2021-01-15 ENCOUNTER — VIRTUAL VISIT (OUTPATIENT)
Dept: CARDIOLOGY | Facility: CLINIC | Age: 56
End: 2021-01-15
Attending: PHYSICIAN ASSISTANT
Payer: COMMERCIAL

## 2021-01-15 ENCOUNTER — MYC MEDICAL ADVICE (OUTPATIENT)
Dept: FAMILY MEDICINE | Facility: OTHER | Age: 56
End: 2021-01-15

## 2021-01-15 DIAGNOSIS — R55 SYNCOPE, UNSPECIFIED SYNCOPE TYPE: ICD-10-CM

## 2021-01-15 DIAGNOSIS — F41.9 ANXIETY: ICD-10-CM

## 2021-01-15 PROCEDURE — 99204 OFFICE O/P NEW MOD 45 MIN: CPT | Mod: 95 | Performed by: INTERNAL MEDICINE

## 2021-01-15 RX ORDER — TAMSULOSIN HYDROCHLORIDE 0.4 MG/1
0.4 CAPSULE ORAL
COMMUNITY
Start: 2020-12-13 | End: 2021-05-24

## 2021-01-15 RX ORDER — ESCITALOPRAM OXALATE 20 MG/1
20 TABLET ORAL DAILY
Qty: 90 TABLET | Refills: 0 | Status: SHIPPED | OUTPATIENT
Start: 2021-01-15 | End: 2021-04-16

## 2021-01-15 NOTE — PROGRESS NOTES
Rebel is a 55 year old who is being evaluated via a billable video visit.      Video-Visit Details    Type of service:  Video Visit    Video Start Time: 8:19 AM    Video End Time: 8:39 AM    Originating Location (pt. Location): Home    Distant Location (provider location):  [facility] HEART [facility]     Platform used for Video Visit: [facility] Cardiology Consultation:    Assessment and Plan:     1. Syncope, possibly orthostatic vs vasovagal, cannot exclude cardiogenic - pt does have some syncope features c/w orthostatic syncope (recently taking tamsulosin) vs vasovagal syncope (sometimes experiencing prodrome, fatigue after awaking), but he also reports some sx concerning for cardiogenic syncope (lack of prodrome, sudden LOC). His two most recent EKGs demonstrate a possible retrograde-conducted P waves and bradycardia, suggesting a junctional rhythm. His TTE does mention some apical thickness, but this is not currently diagnostic for apical variant HCM. For now, will perform ambulatory cardiac monitoring first before considering additional studies.     - 1 week Zio Patch for now.  Given educational material on vasovagal syncope, and advised to supplement hydration with electrolyte drinks.  - additional workup pending results  -Avoid Flomax due to propensity for hypotension.  Use alternate medication such as Proscar if appropriate.  - RTC in 6 weeks      Patient was seen and discussed with attending physician, Dr. Yogi Todd MD.     Delroy Montiel MD, PhD  Cardiology Fellow  Click to text page [phone]    [facility] Cardiovascular Division    I have seen and examined the patient, reviewed labs and tests. I have discussed my findings and treatment recommendations with the house staff and/or Cardiology fellow and agree with their assessment and plan as outlined in the note.    Yogi Todd MD    Cardiac Imaging and  "Prevention  [facility]  Pager: [phone]        HPI:     Rebel Garcia is a 55 year old male with a history of ADHD and BPH who is referred to cardiology for \"passing out\".     First syncopal event started 1.5 weeks ago, for first time in pt's life. Pt was standing at work, talking to his colleagues. Pt then felt like he was \"looking through a telescope backwards\" - ie vision narrowed. \"It got quiet\" as well, for about 15 seconds. Pt felt lightheaded. Pt did feel some kind of palpitation, heart beating fast. Pt fell forward onto his knees. He was braced by his colleagues and shifted to a chair. Pt did not recall completely losing consciousness. After sitting for about 5 minutes, pt's sx resolved. Pt otherwise denied feeling chest pain or shortness of breath at the time.     Since that event, pt has had 4-5 additional passing out events. One time, pt had to go back to look at his own garage Nest camera, to see his syncopal event. He was just walking when he suddenly fell forward into the snow. He was only unconscious for about 20 seconds, when he then \"came to\". He did feel somewhat confused when he woke up. In addition to the aforementioned sx, pt would also feel his hands turn \"super cold\". Pt has also been experiencing severe fatigue in the past week. One other time, pt was driving, when he felt pre-syncopal and had to pull over to the side of the road - this was particularly concerning.     Pt otherwise denies recent fevers, new onset headache. Of note, pt did recently start tamsulosin for BPH. Although he has stopped this medication, his syncopal sx have continued.       EXAM:  There were no vitals taken for this visit.  CONSITUTIONAL: no acute distress  HEENT: no icterus, sclerae white  CV: no visible edema of visualized upper extremities, no gross JVD  CHEST: respirations nonlabored, no audible wheezing  NEURO: AA&Ox3, speech fluent/appropriate, motor grossly nonfocal  PSYCH: cooperative, " affect appropriate  DERM: no rashes on visualized face/neck/upper extremities     The rest of a comprehensive physical examination is deferred due to PHE (public health emergency) video visit restrictions.    PAST MEDICAL HISTORY:  Past Medical History:   Diagnosis Date     Gastroesophageal reflux disease without esophagitis 2018     Hyperlipidemia LDL goal <130 2018     Migraine, unspecified, without mention of intractable migraine without mention of status migrainosus      Sleep apnea        CURRENT MEDICATIONS:  Current Outpatient Medications   Medication     escitalopram (LEXAPRO) 20 MG tablet     hydrOXYzine (ATARAX) 25 MG tablet     dexmethylphenidate (FOCALIN XR) 25 MG 24 hr capsule     tamsulosin (FLOMAX) 0.4 MG capsule     No current facility-administered medications for this visit.        PAST SURGICAL HISTORY:  Past Surgical History:   Procedure Laterality Date     COLONOSCOPY  2000     COLONOSCOPY  02/16/10     COLONOSCOPY N/A 2015    Procedure: COMBINED COLONOSCOPY, SINGLE OR MULTIPLE BIOPSY/POLYPECTOMY BY BIOPSY;  Surgeon: Dima Sosa MD;  Location:  GI     HC CORRECT KIM BORERGO/DARNELL/JILLIAN  2005    Modified Darnell bunionectomy, right foot.     HC REPAIR UMBILICAL HARDY,5+Y/O, INCARCERATED OR STRANGULATED  6/15/2004     HERNIA REPAIR, INCISIONAL  2006    Incarcerated recurrent ventral hernia.     HERNIORRHAPHY UMBILICAL  3/29/2011    HERNIORRHAPHY UMBILICAL performed by ALOK WESLEY at  OR       ALLERGIES     Allergies   Allergen Reactions     No Known Allergies        FAMILY HISTORY:  Family History   Problem Relation Age of Onset     Allergies Father         iodine     Heart Disease Father         heart attack at 59 - CABG at 60     Cerebrovascular Disease Father      Cancer Mother         colon -  at age 56     Cancer Sister         cervix -  at age 38     Neurologic Disorder Sister         CNS bleed x 2 sisters     Prostate  Cancer Brother 58     Other Cancer Brother 62        stage 4 liver cancer     Colon Cancer Sister 68       SOCIAL HISTORY:  Social History     Socioeconomic History     Marital status:      Spouse name: Not on file     Number of children: 2     Years of education: Not on file     Highest education level: Not on file   Occupational History     Occupation: [employer]     Employer: [employer]   Social Needs     Financial resource strain: Not on file     Food insecurity     Worry: Not on file     Inability: Not on file     Transportation needs     Medical: Not on file     Non-medical: Not on file   Tobacco Use     Smoking status: Former Smoker     Packs/day: 0.75     Years: 18.00     Pack years: 13.50     Types: Cigarettes     Quit date: 2006     Years since quittin.9     Smokeless tobacco: Never Used   Substance and Sexual Activity     Alcohol use: Yes     Comment: occ     Drug use: No     Sexual activity: Yes     Partners: Female     Birth control/protection: Pill     Comment: S.O. on birth control pills   Lifestyle     Physical activity     Days per week: Not on file     Minutes per session: Not on file     Stress: Not on file   Relationships     Social connections     Talks on phone: Not on file     Gets together: Not on file     Attends Sabianist service: Not on file     Active member of club or organization: Not on file     Attends meetings of clubs or organizations: Not on file     Relationship status: Not on file     Intimate partner violence     Fear of current or ex partner: Not on file     Emotionally abused: Not on file     Physically abused: Not on file     Forced sexual activity: Not on file   Other Topics Concern      Service No     Blood Transfusions No     Caffeine Concern Yes     Comment: 2 pots of coffee per day     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 No   Social History Narrative     Not on file       ROS:   Constitutional: No fever, chills, or sweats. No weight gain/loss   ENT: No visual disturbance, ear ache, epistaxis, sore throat  Allergies/Immunologic: Negative.   Respiratory: No cough, hemoptysia  Cardiovascular: As per HPI  GI: No nausea, vomiting, hematemesis, melena, or hematochezia  : No urinary frequency, dysuria, or hematuria  Integument: Negative  Psychiatric: Negative  Neuro: Negative  Endocrinology: Negative   Musculoskeletal: Negative    ADDITIONAL COMMENTS:     I reviewed the patient's medications:     I reviewed the patient's pertinent clinical laboratory studies:     I reviewed the patient's pertinent imaging studies:   I reviewed the patient's ECG:  Possible junctional bradycardia       Echocardiogram 1/8/2021  1. Normal biventricular size and systolic function. Left ventricular ejection  fraction of around 55%. Left ventricular apex not well visualized in absence  of contrast use. On certain views the apex appears somewhat thick and  trabeculated. Would recommend use of contrast to better visualize apex in the  future.  2. In normal-sized atria.  3. No significant valvular disease. Thick mitral valve leaflets.  No prior study available for comparison.    MRA brain 1/7/2021  FINDINGS:  The bilateral distal internal carotid, basilar, bilateral  anterior cerebral, bilateral middle cerebral and bilateral posterior  cerebral arteries are patent and unremarkable with no evidence for  cerebral artery stenosis or aneurysm. The anterior communicating  artery is patent and unremarkable.

## 2021-01-15 NOTE — TELEPHONE ENCOUNTER
"Requested Prescriptions   Pending Prescriptions Disp Refills     escitalopram (LEXAPRO) 20 MG tablet 90 tablet 1     Sig: Take 1 tablet (20 mg) by mouth daily       SSRIs Protocol Passed - 1/15/2021 11:23 AM        Passed - Recent (12 mo) or future (30 days) visit within the authorizing provider's specialty     Patient has had an office visit with the authorizing provider or a provider within the authorizing providers department within the previous 12 mos or has a future within next 30 days. See \"Patient Info\" tab in inbasket, or \"Choose Columns\" in Meds & Orders section of the refill encounter.              Passed - Medication is active on med list        Passed - Patient is age 18 or older           Prescription approved per [facility] [place].    Enmanuel Zimmerman, RN, BSN    "

## 2021-02-10 ENCOUNTER — MYC REFILL (OUTPATIENT)
Dept: FAMILY MEDICINE | Facility: OTHER | Age: 56
End: 2021-02-10

## 2021-02-10 DIAGNOSIS — F90.9 ATTENTION DEFICIT HYPERACTIVITY DISORDER (ADHD), UNSPECIFIED ADHD TYPE: ICD-10-CM

## 2021-02-10 RX ORDER — DEXMETHYLPHENIDATE HYDROCHLORIDE 25 MG/1
25 CAPSULE, EXTENDED RELEASE ORAL DAILY
Qty: 30 CAPSULE | Refills: 0 | Status: CANCELLED | OUTPATIENT
Start: 2021-02-10

## 2021-02-10 NOTE — TELEPHONE ENCOUNTER
Pending Prescriptions:                       Disp   Refills    dexmethylphenidate (FOCALIN XR) 25 MG 24 h*30 cap*0        Sig: Take 1 capsule (25 mg) by mouth daily        Routing refill request to provider for review/approval because:  Drug not on the [facility] refill protocol   Last Seen: 01/06/21  Last Refilled: 01/06/21   Next Visit: CHAD Stone RN, BSN  [place]  February 10, 2021

## 2021-02-10 NOTE — TELEPHONE ENCOUNTER
"Spoke with patient, he stopped the medication himself, he says that he is feeling pretty well without and does not need the refill.  States that \"we can re-visit it in the future\".    Please refuse Rx and Close Encounter.    Mikayla Ortiz XRO/  "

## 2021-02-10 NOTE — TELEPHONE ENCOUNTER
Please call pt --Did he request this medication, his med list is showing that he is not taking it currently.  Was it stopped by cardiology or another provider due to his passing out?  Marilu Gomez PA-C

## 2021-02-26 ENCOUNTER — VIRTUAL VISIT (OUTPATIENT)
Dept: CARDIOLOGY | Facility: CLINIC | Age: 56
End: 2021-02-26
Payer: COMMERCIAL

## 2021-02-26 DIAGNOSIS — R55 SYNCOPE, UNSPECIFIED SYNCOPE TYPE: Primary | ICD-10-CM

## 2021-02-26 DIAGNOSIS — R93.1 ABNORMAL ECHOCARDIOGRAM: ICD-10-CM

## 2021-02-26 PROCEDURE — 99214 OFFICE O/P EST MOD 30 MIN: CPT | Mod: 95 | Performed by: INTERNAL MEDICINE

## 2021-02-26 NOTE — PATIENT INSTRUCTIONS
Cardiac MRI at [facility] next available.  Stay hydrated with electrolyte drinks, and change positions slowly.  No follow-up needed.

## 2021-02-26 NOTE — PROGRESS NOTES
Rebel is a 55 year old who is being evaluated via a billable video visit.        Video-Visit Details    Type of service:  Video Visit    Video Start Time: 1:33 PM    Video End Time:1:53 PM    Originating Location (pt. Location): Home    Distant Location (provider location):  [facility] HEART CLINIC [place]     Platform used for Video Visit: HESKA     [facility] Cardiology Virtual Visit:    Assessment and Plan:     1. Syncope, likely orthostatic related to tamsulosin, normal cardiac monitor: Advised to stay hydrated with electrolyte drinks, and change positions slowly.  2. Possible apical HCM on TTE: check cardiac MRI   3. GLENROY on CPAP     Yogi Todd MD    Cardiac Imaging and Prevention  [facility]  [email or web address] Pager: [phone]    HPI: Syncope routine follow-up, video visit.  7-day cardiac monitor was normal without any arrhythmias.  He has not had any further syncopal events.  He continues to have occasional postural lightheadedness.  He also complains of daytime fatigue, and feeling lightheaded and out of breath on minimal exertion.  He has been using his CPAP machine, and feels that he gets good sleep.  We again discussed the possibility of apical HCM on his echocardiogram.  I reiterated that my suspicion for that condition is low, however I cannot rule it out definitively based on his echocardiogram.  We discussed the possibility of doing a cardiac MRI for definitive diagnosis, and he is interested.  Denies any chest pain or pressure, orthopnea, pnd, palpitations, or edema.    EXAM:  GEN/CONSTITUIONAL: Appears comfortable, in no apparent distress   EYES: No icterus noted.   JVP:  Not visible  RESPIRATORY: No cough or labored breathing   NEUROLOGIC: No tremor noted  PSYCHIATRIC: Normal affect  EXT: No edema noted.  Skin: No rash visible  The rest of a comprehensive physical examination is deferred due to PHE (public health emergency) video  visit restrictions.    PAST MEDICAL HISTORY:  Past Medical History:   Diagnosis Date     Gastroesophageal reflux disease without esophagitis 2018     Hyperlipidemia LDL goal <130 2018     Migraine, unspecified, without mention of intractable migraine without mention of status migrainosus      Sleep apnea        CURRENT MEDICATIONS:  Current Outpatient Medications   Medication     escitalopram (LEXAPRO) 20 MG tablet     tamsulosin (FLOMAX) 0.4 MG capsule     dexmethylphenidate (FOCALIN XR) 25 MG 24 hr capsule     hydrOXYzine (ATARAX) 25 MG tablet     No current facility-administered medications for this visit.        PAST SURGICAL HISTORY:  Past Surgical History:   Procedure Laterality Date     COLONOSCOPY  2000     COLONOSCOPY  02/16/10     COLONOSCOPY N/A 2015    Procedure: COMBINED COLONOSCOPY, SINGLE OR MULTIPLE BIOPSY/POLYPECTOMY BY BIOPSY;  Surgeon: Dima Sosa MD;  Location:  GI     HC CORRECT KIM BORREGO/DARNELL/JILLIAN  2005    Modified Darnell bunionectomy, right foot.     HC REPAIR UMBILICAL HARDY,5+Y/O, INCARCERATED OR STRANGULATED  6/15/2004     HERNIA REPAIR, INCISIONAL  2006    Incarcerated recurrent ventral hernia.     HERNIORRHAPHY UMBILICAL  3/29/2011    HERNIORRHAPHY UMBILICAL performed by ALOK WESLEY at  OR       ALLERGIES     Allergies   Allergen Reactions     No Known Allergies        FAMILY HISTORY:  Family History   Problem Relation Age of Onset     Allergies Father         iodine     Heart Disease Father         heart attack at 59 - CABG at 60     Cerebrovascular Disease Father      Cancer Mother         colon -  at age 56     Cancer Sister         cervix -  at age 38     Neurologic Disorder Sister         CNS bleed x 2 sisters     Prostate Cancer Brother 58     Other Cancer Brother 62        stage 4 liver cancer     Colon Cancer Sister 68       SOCIAL HISTORY:  Social History     Socioeconomic History     Marital status:       Spouse name: Not on file     Number of children: 2     Years of education: Not on file     Highest education level: Not on file   Occupational History     Occupation: Printer     Employer: [employer]   Social Needs     Financial resource strain: Not on file     Food insecurity     Worry: Not on file     Inability: Not on file     Transportation needs     Medical: Not on file     Non-medical: Not on file   Tobacco Use     Smoking status: Former Smoker     Packs/day: 0.75     Years: 18.00     Pack years: 13.50     Types: Cigarettes     Quit date: 2/14/2006     Years since quitting: 15.0     Smokeless tobacco: Never Used   Substance and Sexual Activity     Alcohol use: Yes     Comment: occ     Drug use: No     Sexual activity: Yes     Partners: Female     Birth control/protection: Pill     Comment: S.O. on birth control pills   Lifestyle     Physical activity     Days per week: Not on file     Minutes per session: Not on file     Stress: Not on file   Relationships     Social connections     Talks on phone: Not on file     Gets together: Not on file     Attends Jewish service: Not on file     Active member of club or organization: Not on file     Attends meetings of clubs or organizations: Not on file     Relationship status: Not on file     Intimate partner violence     Fear of current or ex partner: Not on file     Emotionally abused: Not on file     Physically abused: Not on file     Forced sexual activity: Not on file   Other Topics Concern      Service No     Blood Transfusions No     Caffeine Concern Yes     Comment: 2 pots of coffee per day     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 No   Social History Narrative     Not on file       ROS:   Constitutional: No fever, chills, or sweats. No weight gain/loss   ENT: No visual disturbance, ear ache, epistaxis, sore throat  Allergies/Immunologic: Negative.   Respiratory: No cough, hemoptysia  Cardiovascular: As per HPI  GI: No nausea, vomiting, hematemesis, melena, or hematochezia  : No urinary frequency, dysuria, or hematuria  Integument: Negative  Psychiatric: Negative  Neuro: Negative  Endocrinology: Negative   Musculoskeletal: Negative    ADDITIONAL COMMENTS:     I reviewed the patient's medications:     I reviewed the patient's pertinent clinical laboratory studies:     I reviewed the patient's pertinent imaging studies:   I reviewed the patient's ECG:

## 2021-03-24 ENCOUNTER — MYC MEDICAL ADVICE (OUTPATIENT)
Dept: FAMILY MEDICINE | Facility: OTHER | Age: 56
End: 2021-03-24

## 2021-04-12 ENCOUNTER — MYC MEDICAL ADVICE (OUTPATIENT)
Dept: FAMILY MEDICINE | Facility: OTHER | Age: 56
End: 2021-04-12

## 2021-04-16 DIAGNOSIS — F41.9 ANXIETY: ICD-10-CM

## 2021-04-16 RX ORDER — ESCITALOPRAM OXALATE 20 MG/1
20 TABLET ORAL DAILY
Qty: 90 TABLET | Refills: 1 | Status: SHIPPED | OUTPATIENT
Start: 2021-04-16 | End: 2021-10-22

## 2021-05-05 ENCOUNTER — APPOINTMENT (OUTPATIENT)
Dept: URGENT CARE | Facility: CLINIC | Age: 56
End: 2021-05-05
Payer: COMMERCIAL

## 2021-05-18 ENCOUNTER — MYC MEDICAL ADVICE (OUTPATIENT)
Dept: FAMILY MEDICINE | Facility: OTHER | Age: 56
End: 2021-05-18

## 2021-05-18 RX ORDER — TAMSULOSIN HYDROCHLORIDE 0.4 MG/1
0.4 CAPSULE ORAL DAILY
Status: CANCELLED | OUTPATIENT
Start: 2021-05-18

## 2021-05-18 NOTE — TELEPHONE ENCOUNTER
Routing refill request to provider for review/approval because:  Medication is reported/historical    Radha Diaz RN on 5/18/2021 at 10:14 AM

## 2021-05-22 NOTE — PROGRESS NOTES
"    Assessment & Plan     Benign prostatic hyperplasia with incomplete bladder emptying  Improved with use of Flomax, he will continue his current dosage and alert me at once if symptoms are worsening or changing and we would have him see urology  - tamsulosin (FLOMAX) 0.4 MG capsule; Take 1 capsule (0.4 mg) by mouth daily    History of colonic polyps  Patient is ready to do colonoscopy, this was ordered they will call him to schedule  - GASTROENTEROLOGY ADULT REF PROCEDURE ONLY; Future    Special screening for malignant neoplasms, colon    - GASTROENTEROLOGY ADULT REF PROCEDURE ONLY; Future    Screening for HIV (human immunodeficiency virus)  Declined by patient      Anxiety and depression much improved, will continue current dosage of Escitalopram.  He is no longer needing hydroxyzine or his Focalin XR         BMI:   Estimated body mass index is 27.28 kg/m  as calculated from the following:    Height as of 1/6/21: 1.81 m (5' 11.26\").    Weight as of this encounter: 89.4 kg (197 lb).   Weight management plan: Discussed healthy diet and exercise guidelines        Return in about 1 year (around 5/24/2022), or if symptoms worsen or fail to improve.    KEAGAN Walker [facility] INDIO Luque is a 55 year old who presents for the following health issues     HPI     Anxiety Follow-Up    How are you doing with your anxiety since your last visit?  Improved,    Are you having other symptoms that might be associated with anxiety? No    Have you had a significant life event? Relationship Concerns divorce    Are you feeling depressed? No    Do you have any concerns with your use of alcohol or other drugs? No    Social History     Tobacco Use     Smoking status: Former Smoker     Packs/day: 0.75     Years: 18.00     Pack years: 13.50     Types: Cigarettes     Quit date: 2/14/2006     Years since quitting: 15.2     Smokeless tobacco: Never Used   Substance Use Topics     Alcohol use: Yes     " Comment: occ     Drug use: No     LUIS-7 SCORE 10/13/2020 12/28/2020 1/6/2021   Total Score - - -   Total Score - 12 (moderate anxiety) 4 (minimal anxiety)   Total Score 13 12 4     PHQ 8/11/2020 12/28/2020 1/6/2021   PHQ-9 Total Score 2 13 4   Q9: Thoughts of better off dead/self-harm past 2 weeks Not at all Not at all Not at all     Last PHQ-9 1/6/2021   1.  Little interest or pleasure in doing things 0   2.  Feeling down, depressed, or hopeless 1   3.  Trouble falling or staying asleep, or sleeping too much 1   4.  Feeling tired or having little energy 1   5.  Poor appetite or overeating 1   6.  Feeling bad about yourself 0   7.  Trouble concentrating 0   8.  Moving slowly or restless 0   Q9: Thoughts of better off dead/self-harm past 2 weeks 0   PHQ-9 Total Score 4   Difficulty at work, home, or with people -     LUIS-7  1/6/2021   1. Feeling nervous, anxious, or on edge 1   2. Not being able to stop or control worrying 1   3. Worrying too much about different things 1   4. Trouble relaxing 0   5. Being so restless that it is hard to sit still 0   6. Becoming easily annoyed or irritable 1   7. Feeling afraid, as if something awful might happen 0   LUIS-7 Total Score 4   If you checked any problems, how difficult have they made it for you to do your work, take care of things at home, or get along with other people? -       Pt needs a refill on his Tamsulosin.  His previous [facility] message she commented that he wanted an increased dosage of Flomax because the current dosage was not effective.  However, after being off the med for 1 week he has noticed just how effective it was.  He has a lot of urinary hesitancy.  He no longer has any nocturia or changes in stream.  He saw Lamonte Mane November 6 of 2020 to review the results of the CT scan which showed a prominent prostate.  Prostate exam that was performed by Lamonte was minimally enlarged but smooth.  He would like to continue his current dosage has had no change to  his urinary symptoms other than noticing the hesitancy which resolves with use of Flomax    Review of Systems   Constitutional, HEENT, cardiovascular, pulmonary, gi and gu systems are negative, except as otherwise noted.      Objective    /70   Pulse 63   Temp 98  F (36.7  C) (Temporal)   Resp 12   Wt 89.4 kg (197 lb)   SpO2 97%   BMI 27.28 kg/m    Body mass index is 27.28 kg/m .  Physical Exam   GENERAL: healthy, alert and no distress  PSYCH: mentation appears normal, affect normal/bright    Office Visit on 01/06/2021   Component Date Value Ref Range Status     Color Urine 01/06/2021 Yellow   Final     Appearance Urine 01/06/2021 Clear   Final     Glucose Urine 01/06/2021 Negative  NEG^Negative mg/dL Final     Bilirubin Urine 01/06/2021 Negative  NEG^Negative Final     Ketones Urine 01/06/2021 Negative  NEG^Negative mg/dL Final     Specific Gravity Urine 01/06/2021 >1.030  1.003 - 1.035 Final     Blood Urine 01/06/2021 Negative  NEG^Negative Final     pH Urine 01/06/2021 6.0  5.0 - 7.0 pH Final     Protein Albumin Urine 01/06/2021 Negative  NEG^Negative mg/dL Final     Urobilinogen Urine 01/06/2021 0.2  0.2 - 1.0 EU/dL Final     Nitrite Urine 01/06/2021 Negative  NEG^Negative Final     Leukocyte Esterase Urine 01/06/2021 Negative  NEG^Negative Final     Source 01/06/2021 Unspecified Urine   Final     Sodium 01/06/2021 137  133 - 144 mmol/L Final     Potassium 01/06/2021 5.1  3.4 - 5.3 mmol/L Final     Chloride 01/06/2021 103  94 - 109 mmol/L Final     Carbon Dioxide 01/06/2021 31  20 - 32 mmol/L Final     Anion Gap 01/06/2021 3  3 - 14 mmol/L Final     Glucose 01/06/2021 108* 70 - 99 mg/dL Final     Urea Nitrogen 01/06/2021 18  7 - 30 mg/dL Final     Creatinine 01/06/2021 0.98  0.66 - 1.25 mg/dL Final     GFR Estimate 01/06/2021 86  >60 mL/min/[1.73_m2] Final    Comment: Non  GFR Calc  Starting 12/18/2018, serum creatinine based estimated GFR (eGFR) will be   calculated using the Chronic  Kidney Disease Epidemiology Collaboration   (CKD-EPI) equation.       GFR Estimate If Black 01/06/2021 >90  >60 mL/min/[1.73_m2] Final    Comment:  GFR Calc  Starting 12/18/2018, serum creatinine based estimated GFR (eGFR) will be   calculated using the Chronic Kidney Disease Epidemiology Collaboration   (CKD-EPI) equation.       Calcium 01/06/2021 9.7  8.5 - 10.1 mg/dL Final     Bilirubin Total 01/06/2021 0.7  0.2 - 1.3 mg/dL Final     Albumin 01/06/2021 4.2  3.4 - 5.0 g/dL Final     Protein Total 01/06/2021 7.7  6.8 - 8.8 g/dL Final     Alkaline Phosphatase 01/06/2021 50  40 - 150 U/L Final     ALT 01/06/2021 27  0 - 70 U/L Final     AST 01/06/2021 16  0 - 45 U/L Final     TSH 01/06/2021 1.08  0.40 - 4.00 mU/L Final     WBC 01/06/2021 6.5  4.0 - 11.0 10e9/L Final     RBC Count 01/06/2021 5.42  4.4 - 5.9 10e12/L Final     Hemoglobin 01/06/2021 16.5  13.3 - 17.7 g/dL Final     Hematocrit 01/06/2021 48.9  40.0 - 53.0 % Final     MCV 01/06/2021 90  78 - 100 fl Final     MCH 01/06/2021 30.4  26.5 - 33.0 pg Final     MCHC 01/06/2021 33.7  31.5 - 36.5 g/dL Final     RDW 01/06/2021 13.5  10.0 - 15.0 % Final     Platelet Count 01/06/2021 204  150 - 450 10e9/L Final     % Neutrophils 01/06/2021 70.6  % Final     % Lymphocytes 01/06/2021 15.8  % Final     % Monocytes 01/06/2021 11.5  % Final     % Eosinophils 01/06/2021 1.9  % Final     % Basophils 01/06/2021 0.2  % Final     Absolute Neutrophil 01/06/2021 4.6  1.6 - 8.3 10e9/L Final     Absolute Lymphocytes 01/06/2021 1.0  0.8 - 5.3 10e9/L Final     Absolute Monocytes 01/06/2021 0.7  0.0 - 1.3 10e9/L Final     Absolute Eosinophils 01/06/2021 0.1  0.0 - 0.7 10e9/L Final     Absolute Basophils 01/06/2021 0.0  0.0 - 0.2 10e9/L Final     Diff Method 01/06/2021 Automated Method   Final

## 2021-05-24 ENCOUNTER — OFFICE VISIT (OUTPATIENT)
Dept: FAMILY MEDICINE | Facility: OTHER | Age: 56
End: 2021-05-24
Payer: COMMERCIAL

## 2021-05-24 VITALS
OXYGEN SATURATION: 97 % | WEIGHT: 197 LBS | TEMPERATURE: 98 F | RESPIRATION RATE: 12 BRPM | HEART RATE: 63 BPM | SYSTOLIC BLOOD PRESSURE: 120 MMHG | DIASTOLIC BLOOD PRESSURE: 70 MMHG | BODY MASS INDEX: 27.28 KG/M2

## 2021-05-24 DIAGNOSIS — Z12.11 SPECIAL SCREENING FOR MALIGNANT NEOPLASMS, COLON: ICD-10-CM

## 2021-05-24 DIAGNOSIS — Z86.0100 HISTORY OF COLONIC POLYPS: ICD-10-CM

## 2021-05-24 DIAGNOSIS — R39.14 BENIGN PROSTATIC HYPERPLASIA WITH INCOMPLETE BLADDER EMPTYING: ICD-10-CM

## 2021-05-24 DIAGNOSIS — Z11.4 SCREENING FOR HIV (HUMAN IMMUNODEFICIENCY VIRUS): Primary | ICD-10-CM

## 2021-05-24 DIAGNOSIS — N40.1 BENIGN PROSTATIC HYPERPLASIA WITH INCOMPLETE BLADDER EMPTYING: ICD-10-CM

## 2021-05-24 PROCEDURE — 99213 OFFICE O/P EST LOW 20 MIN: CPT | Performed by: PHYSICIAN ASSISTANT

## 2021-05-24 RX ORDER — TAMSULOSIN HYDROCHLORIDE 0.4 MG/1
0.4 CAPSULE ORAL DAILY
Qty: 90 CAPSULE | Refills: 3 | Status: SHIPPED | OUTPATIENT
Start: 2021-05-24 | End: 2022-07-11

## 2021-05-24 ASSESSMENT — ANXIETY QUESTIONNAIRES
7. FEELING AFRAID AS IF SOMETHING AWFUL MIGHT HAPPEN: NOT AT ALL
1. FEELING NERVOUS, ANXIOUS, OR ON EDGE: SEVERAL DAYS
GAD7 TOTAL SCORE: 1
6. BECOMING EASILY ANNOYED OR IRRITABLE: NOT AT ALL
5. BEING SO RESTLESS THAT IT IS HARD TO SIT STILL: NOT AT ALL
2. NOT BEING ABLE TO STOP OR CONTROL WORRYING: NOT AT ALL
3. WORRYING TOO MUCH ABOUT DIFFERENT THINGS: NOT AT ALL

## 2021-05-24 ASSESSMENT — PATIENT HEALTH QUESTIONNAIRE - PHQ9
5. POOR APPETITE OR OVEREATING: NOT AT ALL
SUM OF ALL RESPONSES TO PHQ QUESTIONS 1-9: 1

## 2021-05-25 ASSESSMENT — ANXIETY QUESTIONNAIRES: GAD7 TOTAL SCORE: 1

## 2021-05-26 ENCOUNTER — TELEPHONE (OUTPATIENT)
Dept: FAMILY MEDICINE | Facility: OTHER | Age: 56
End: 2021-05-26

## 2021-05-26 ENCOUNTER — HOSPITAL ENCOUNTER (OUTPATIENT)
Facility: CLINIC | Age: 56
End: 2021-05-26
Attending: SURGERY | Admitting: SURGERY
Payer: COMMERCIAL

## 2021-05-26 NOTE — LETTER

## 2021-05-26 NOTE — TELEPHONE ENCOUNTER
Date of procedure: 7/9  Colonoscopy  Surgeon: Dr. Pizarro  Prep:Miralax  Packet: mychart ok per pt    Date: 5/26/2021      Surgery Scheduler

## 2021-06-04 DIAGNOSIS — Z11.59 ENCOUNTER FOR SCREENING FOR OTHER VIRAL DISEASES: ICD-10-CM

## 2021-06-09 NOTE — TELEPHONE ENCOUNTER
Patient called to reschedule due to work conflict.  Scope scheduled for 7/16  Covid test rescheduled also.

## 2021-07-13 ENCOUNTER — TELEPHONE (OUTPATIENT)
Dept: GASTROENTEROLOGY | Facility: CLINIC | Age: 56
End: 2021-07-13

## 2021-07-13 NOTE — TELEPHONE ENCOUNTER
Caller: Rebel Garcia    Procedure: Colonoscopy     Date of Procedure Cancelled: 7/16/2021    Ordering Provider:Marilu Gomez    Reason for cancel: pt cx procedure due to a work conflict    Rescheduled: not at the time of the call     If rescheduled    Date:    Location:    Note any change or update to original order/sedation:

## 2021-08-24 ENCOUNTER — MYC MEDICAL ADVICE (OUTPATIENT)
Dept: FAMILY MEDICINE | Facility: OTHER | Age: 56
End: 2021-08-24

## 2021-08-24 NOTE — TELEPHONE ENCOUNTER
Sent mychart.     Catrachito Stone, ROSAN, RN, PHN  [place]  August 24, 2021

## 2021-09-01 ENCOUNTER — OFFICE VISIT (OUTPATIENT)
Dept: FAMILY MEDICINE | Facility: OTHER | Age: 56
End: 2021-09-01
Payer: COMMERCIAL

## 2021-09-01 ENCOUNTER — ANCILLARY PROCEDURE (OUTPATIENT)
Dept: GENERAL RADIOLOGY | Facility: OTHER | Age: 56
End: 2021-09-01
Attending: PHYSICIAN ASSISTANT
Payer: COMMERCIAL

## 2021-09-01 VITALS
BODY MASS INDEX: 28.38 KG/M2 | OXYGEN SATURATION: 97 % | WEIGHT: 205 LBS | HEART RATE: 68 BPM | TEMPERATURE: 96.7 F | DIASTOLIC BLOOD PRESSURE: 84 MMHG | RESPIRATION RATE: 12 BRPM | SYSTOLIC BLOOD PRESSURE: 126 MMHG

## 2021-09-01 DIAGNOSIS — R04.2 BLOOD IN SPUTUM: ICD-10-CM

## 2021-09-01 DIAGNOSIS — H61.21 IMPACTED CERUMEN OF RIGHT EAR: ICD-10-CM

## 2021-09-01 DIAGNOSIS — R19.5 LOOSE STOOLS: ICD-10-CM

## 2021-09-01 DIAGNOSIS — R04.2 BLOOD IN SPUTUM: Primary | ICD-10-CM

## 2021-09-01 LAB
ALBUMIN SERPL-MCNC: 4.3 G/DL (ref 3.4–5)
ALP SERPL-CCNC: 50 U/L (ref 40–150)
ALT SERPL W P-5'-P-CCNC: 63 U/L (ref 0–70)
ANION GAP SERPL CALCULATED.3IONS-SCNC: 4 MMOL/L (ref 3–14)
AST SERPL W P-5'-P-CCNC: 47 U/L (ref 0–45)
BASOPHILS # BLD AUTO: 0 10E3/UL (ref 0–0.2)
BASOPHILS NFR BLD AUTO: 0 %
BILIRUB SERPL-MCNC: 0.4 MG/DL (ref 0.2–1.3)
BUN SERPL-MCNC: 20 MG/DL (ref 7–30)
CALCIUM SERPL-MCNC: 9.3 MG/DL (ref 8.5–10.1)
CHLORIDE BLD-SCNC: 106 MMOL/L (ref 94–109)
CO2 SERPL-SCNC: 28 MMOL/L (ref 20–32)
CREAT SERPL-MCNC: 1 MG/DL (ref 0.66–1.25)
EOSINOPHIL # BLD AUTO: 0.1 10E3/UL (ref 0–0.7)
EOSINOPHIL NFR BLD AUTO: 2 %
ERYTHROCYTE [DISTWIDTH] IN BLOOD BY AUTOMATED COUNT: 13.2 % (ref 10–15)
GFR SERPL CREATININE-BSD FRML MDRD: 84 ML/MIN/1.73M2
GLUCOSE BLD-MCNC: 93 MG/DL (ref 70–99)
HCT VFR BLD AUTO: 44.8 % (ref 40–53)
HGB BLD-MCNC: 15.4 G/DL (ref 13.3–17.7)
LYMPHOCYTES # BLD AUTO: 1.6 10E3/UL (ref 0.8–5.3)
LYMPHOCYTES NFR BLD AUTO: 28 %
MCH RBC QN AUTO: 31.4 PG (ref 26.5–33)
MCHC RBC AUTO-ENTMCNC: 34.4 G/DL (ref 31.5–36.5)
MCV RBC AUTO: 91 FL (ref 78–100)
MONOCYTES # BLD AUTO: 0.8 10E3/UL (ref 0–1.3)
MONOCYTES NFR BLD AUTO: 13 %
NEUTROPHILS # BLD AUTO: 3.3 10E3/UL (ref 1.6–8.3)
NEUTROPHILS NFR BLD AUTO: 57 %
PLATELET # BLD AUTO: 219 10E3/UL (ref 150–450)
POTASSIUM BLD-SCNC: 4 MMOL/L (ref 3.4–5.3)
PROT SERPL-MCNC: 7.5 G/DL (ref 6.8–8.8)
RBC # BLD AUTO: 4.9 10E6/UL (ref 4.4–5.9)
SODIUM SERPL-SCNC: 138 MMOL/L (ref 133–144)
WBC # BLD AUTO: 5.8 10E3/UL (ref 4–11)

## 2021-09-01 PROCEDURE — 99214 OFFICE O/P EST MOD 30 MIN: CPT | Performed by: PHYSICIAN ASSISTANT

## 2021-09-01 PROCEDURE — 36415 COLL VENOUS BLD VENIPUNCTURE: CPT | Performed by: PHYSICIAN ASSISTANT

## 2021-09-01 PROCEDURE — 85025 COMPLETE CBC W/AUTO DIFF WBC: CPT | Performed by: PHYSICIAN ASSISTANT

## 2021-09-01 PROCEDURE — 86481 TB AG RESPONSE T-CELL SUSP: CPT | Performed by: PHYSICIAN ASSISTANT

## 2021-09-01 PROCEDURE — 80053 COMPREHEN METABOLIC PANEL: CPT | Performed by: PHYSICIAN ASSISTANT

## 2021-09-01 PROCEDURE — 71046 X-RAY EXAM CHEST 2 VIEWS: CPT | Performed by: RADIOLOGY

## 2021-09-01 ASSESSMENT — PAIN SCALES - GENERAL: PAINLEVEL: NO PAIN (0)

## 2021-09-01 NOTE — PROGRESS NOTES
Assessment & Plan     Blood in sputum  It is not clear if this is blood in sputum is from his lungs, doubtful or from his posterior nasal/pharyngeal cavity.  We will rule out active TB with chest x-ray and potential of TB with a TB Gold.  Other labs pending since he is found benefit from antihistamines he will continue to use that and see ear nose and throat as well.  - CBC with platelets and differential; Future  - Comprehensive metabolic panel (BMP + Alb, Alk Phos, ALT, AST, Total. Bili, TP); Future  - Quantiferon TB Gold Plus; Future  - XR Chest 2 Views; Future  - Otolaryngology Referral; Future  - Quantiferon TB Gold Plus  - Comprehensive metabolic panel (BMP + Alb, Alk Phos, ALT, AST, Total. Bili, TP)  - CBC with platelets and differential    We can also consider upper GI endoscopy if symptoms continue, he is very sure this is not at all similar to when he had his peptic ulcer.    Follow-up if loose stools persist    Right cerumen impaction, gave option of ear lavage, he will wait until he sees Ear nose and throat for them to address.    Return in about 2 weeks (around 9/15/2021), or if symptoms worsen or fail to improve.    KEAGAN Walker [facility]    Juana Luque is a 56 year old who presents for the following health issues     HPI     Diarrhea  Onset/Duration: 2 wks  Description:       Consistency of stool: loose, may be watery or soft, no blood-- he is less concerned with this today, more concerned with blood in sputum       Blood in stool: no       Number of loose stools past 24 hours: 4-5  Progression of Symptoms: same  Accompanying signs and symptoms:       Fever: no       Nausea/Vomiting: YES- see below       Abdominal pain: no       Weight loss: no       Episodes of constipation: no  History   Ill contacts: no  Recent use of antibiotics: no  Recent travels: no  Recent medication-new or changes(Rx or OTC): no  Precipitating or alleviating factors: None  Therapies  tried and outcome: Tums    Concern - nausea with blood in his sputum  Onset: 2 wks has this symptom only right away in the morning.  Description: Has been having nausea, followed by watering in his mouth, and then he gags to the point that he will throw up.  He dry heaves only, does not actually vomit up stomach contents. He has had 4 days last week when he noted small amount of bright red blood in sink, he estimates it to be less than a teaspoon each time.  Once he clears his throat of the sputum his symptoms resolve for the entire day.  He may have a minimal cough but that is only to clear the mucus out of his throat.  He has had some night sweats recently this is new.  He has had no fevers and no exposure to anyone with tuberculosis.  He does not have a cough per se except he may cough to clear the sputum as above.  He did try an antihistamine and did not have the gaging, nausea or sputum production the next day.  He is not sure that he feels any postnasal drip for sure.  He is worried because he has a history of an ulcer.  His ulcer was extremely painful however he had a lot of epigastric pain and burning which he does not have at this point.  Intensity: moderate  Progression of Symptoms:  same  Accompanying Signs & Symptoms: loose stools see above  Previous history of similar problem: no   Precipitating factors:        Worsened by: no   Alleviating factors:        Improved by: no  Therapies tried and outcome: Tums, antihistamine  He has no history of allergic rhinitis or history of nosebleeds.      Review of Systems   Constitutional, HEENT, cardiovascular, pulmonary, gi and gu systems are negative, except as otherwise noted.      Objective    /84   Pulse 68   Temp (!) 96.7  F (35.9  C) (Temporal)   Resp 12   Wt 93 kg (205 lb)   SpO2 97%   BMI 28.38 kg/m    Body mass index is 28.38 kg/m .  Physical Exam   GENERAL: healthy, alert and no distress  HENT: normal cephalic/atraumatic, right ear: occluded  with wax, left ear: normal: no effusions, no erythema, normal landmarks, nose and mouth without ulcers or lesions, nasal mucosa edematous , oropharynx clear and oral mucous membranes moist  NECK: no adenopathy, no asymmetry, masses, or scars and thyroid normal to palpation  RESP: lungs clear to auscultation - no rales, rhonchi or wheezes  CV: regular rate and rhythm, normal S1 S2, no S3 or S4, no murmur, click or rub, no peripheral edema and peripheral pulses strong  ABDOMEN: soft, nontender, no hepatosplenomegaly, no masses and bowel sounds normal  MS: no gross musculoskeletal defects noted, no edema  PSYCH: mentation appears normal, affect normal/bright    Results for orders placed or performed in visit on 09/01/21   XR Chest 2 Views     Status: None    Narrative    CHEST TWO VIEWS 9/1/2021 2:38 PM     HISTORY: Blood in sputum    COMPARISON: 7/23/2019       Impression    IMPRESSION: There are no acute infiltrates. The cardiac silhouette is  not enlarged. Pulmonary vasculature is unremarkable.    GOLD VICTOR MD         SYSTEM ID:  [id number]   Results for orders placed or performed in visit on 09/01/21   CBC with platelets and differential     Status: None    Narrative    The following orders were created for panel order CBC with platelets and differential.  Procedure                               Abnormality         Status                     ---------                               -----------         ------                     CBC with platelets and d...[[id number]]                      Final result                 Please view results for these tests on the individual orders.   CBC with platelets and differential     Status: None   Result Value Ref Range    WBC Count 5.8 4.0 - 11.0 10e3/uL    RBC Count 4.90 4.40 - 5.90 10e6/uL    Hemoglobin 15.4 13.3 - 17.7 g/dL    Hematocrit 44.8 40.0 - 53.0 %    MCV 91 78 - 100 fL    MCH 31.4 26.5 - 33.0 pg    MCHC 34.4 31.5 - 36.5 g/dL    RDW 13.2 10.0 - 15.0 %    Platelet  Count 219 150 - 450 10e3/uL    % Neutrophils 57 %    % Lymphocytes 28 %    % Monocytes 13 %    % Eosinophils 2 %    % Basophils 0 %    Absolute Neutrophils 3.3 1.6 - 8.3 10e3/uL    Absolute Lymphocytes 1.6 0.8 - 5.3 10e3/uL    Absolute Monocytes 0.8 0.0 - 1.3 10e3/uL    Absolute Eosinophils 0.1 0.0 - 0.7 10e3/uL    Absolute Basophils 0.0 0.0 - 0.2 10e3/uL   Quantiferon TB Gold Plus     Status: None (In process)    Specimen: Arm, Right; Blood    Narrative    The following orders were created for panel order Quantiferon TB Gold Plus.  Procedure                               Abnormality         Status                     ---------                               -----------         ------                     Quantiferon TB Gold Plus...[[id number]]                      In process                 Quantiferon TB Gold Plus...[[id number]]                      In process                 Quantiferon TB Gold Plus...[[id number]]                      In process                 Quantiferon TB Gold Plus...[[id number]]                      In process                   Please view results for these tests on the individual orders.

## 2021-09-03 LAB
GAMMA INTERFERON BACKGROUND BLD IA-ACNC: 0.03 IU/ML
M TB IFN-G BLD-IMP: NEGATIVE
M TB IFN-G CD4+ BCKGRND COR BLD-ACNC: 9.97 IU/ML
MITOGEN IGNF BCKGRD COR BLD-ACNC: 0.02 IU/ML
MITOGEN IGNF BCKGRD COR BLD-ACNC: 0.03 IU/ML
QUANTIFERON MITOGEN: 10 IU/ML
QUANTIFERON NIL TUBE: 0.03 IU/ML
QUANTIFERON TB1 TUBE: 0.06 IU/ML
QUANTIFERON TB2 TUBE: 0.05

## 2021-09-20 ENCOUNTER — OFFICE VISIT (OUTPATIENT)
Dept: OTOLARYNGOLOGY | Facility: CLINIC | Age: 56
End: 2021-09-20
Attending: PHYSICIAN ASSISTANT
Payer: COMMERCIAL

## 2021-09-20 VITALS
BODY MASS INDEX: 28.91 KG/M2 | SYSTOLIC BLOOD PRESSURE: 120 MMHG | DIASTOLIC BLOOD PRESSURE: 68 MMHG | WEIGHT: 206.5 LBS | HEIGHT: 71 IN | TEMPERATURE: 98 F

## 2021-09-20 DIAGNOSIS — R09.A2 GLOBUS SENSATION: ICD-10-CM

## 2021-09-20 DIAGNOSIS — H61.21 IMPACTED CERUMEN OF RIGHT EAR: ICD-10-CM

## 2021-09-20 DIAGNOSIS — K21.9 LPRD (LARYNGOPHARYNGEAL REFLUX DISEASE): Primary | ICD-10-CM

## 2021-09-20 DIAGNOSIS — R04.2 BLOOD IN SPUTUM: ICD-10-CM

## 2021-09-20 PROCEDURE — 69210 REMOVE IMPACTED EAR WAX UNI: CPT | Mod: RT | Performed by: OTOLARYNGOLOGY

## 2021-09-20 PROCEDURE — 31575 DIAGNOSTIC LARYNGOSCOPY: CPT | Performed by: OTOLARYNGOLOGY

## 2021-09-20 PROCEDURE — 99213 OFFICE O/P EST LOW 20 MIN: CPT | Mod: 25 | Performed by: OTOLARYNGOLOGY

## 2021-09-20 RX ORDER — OMEPRAZOLE 40 MG/1
40 CAPSULE, DELAYED RELEASE ORAL DAILY
Qty: 30 CAPSULE | Refills: 3 | Status: SHIPPED | OUTPATIENT
Start: 2021-09-20 | End: 2021-10-20

## 2021-09-20 ASSESSMENT — MIFFLIN-ST. JEOR: SCORE: 1792.93

## 2021-09-20 NOTE — PROGRESS NOTES
ENT Consultation    Rebel Garcia who is a 56 year old male seen in consultation at the request of Marilu Gomez PA-C.      History of Present Illness - Rebel Garcia is a 56 year old male presents with 2 issues the main one being that when he wakes in the morning he experiences globus sensation gags coughs a lot and then produce few traces of blood.  Started about a month ago.  He is not sure but may have to change his diet to later time eating supper he has had some GERD symptoms for about a month or so.  Denies any nasal symptomatology of congestion postnasal drainage or allergies.  Denies any voice related changes.  No dyspnea no dysphagia.    Second issue problems with cerumen impaction on the right side.  Ear always feels plugged with wax.  Body mass index is 28.59 kg/m .      BP Readings from Last 1 Encounters:   09/20/21 120/68       BP noted to be well controlled today in office.     Rebel IS NOT a smoker/uses chewing tobacco.  Patient already quit smoking.    Past Medical History -   Past Medical History:   Diagnosis Date     Gastroesophageal reflux disease without esophagitis 12/06/2018     Hyperlipidemia LDL goal <130 12/06/2018     Migraine, unspecified, without mention of intractable migraine without mention of status migrainosus      Orthostatic syncope      Sleep apnea        Current Medications -   Current Outpatient Medications:      escitalopram (LEXAPRO) 20 MG tablet, Take 1 tablet (20 mg) by mouth daily, Disp: 90 tablet, Rfl: 1     tamsulosin (FLOMAX) 0.4 MG capsule, Take 1 capsule (0.4 mg) by mouth daily, Disp: 90 capsule, Rfl: 3    Allergies -   Allergies   Allergen Reactions     No Known Allergies        Social History -   Social History     Socioeconomic History     Marital status:      Spouse name: Not on file     Number of children: 2     Years of education: Not on file     Highest education level: Not on file   Occupational History     Occupation: Printer     Employer: [employer] [place]   Tobacco Use     Smoking status: Former Smoker     Packs/day: 0.75     Years: 18.00     Pack years: 13.50     Types: Cigarettes     Quit date: 2/14/2006     Years since quitting: 15.6     Smokeless tobacco: Never Used   Vaping Use     Vaping Use: Never used   Substance and Sexual Activity     Alcohol use: Yes     Comment: occ     Drug use: No     Sexual activity: Yes     Partners: Female     Birth control/protection: Pill     Comment: S.O. on birth control pills   Other Topics Concern      Service No     Blood Transfusions No     Caffeine Concern Yes     Comment: 2 pots of coffee per day     Occupational Exposure Yes     Comment: [employer]     [place] Hazards No     Sleep Concern Yes     Comment: recently related to anxiety     Stress Concern No     Weight Concern No     Special Diet No     Back Care No     Exercise Yes     Bike Helmet Yes     Seat Belt Yes     Self-Exams No     Parent/sibling w/ CABG, MI or angioplasty before 65F 55M? No   Social History Narrative     Not on file     Social Determinants of Health     Financial Resource Strain:      Difficulty of Paying Living Expenses:    Food Insecurity:      Worried About Running Out of Food in the Last Year:      Ran Out of Food in the Last Year:    Transportation Needs:      Lack of Transportation (Medical):      Lack of Transportation (Non-Medical):    Physical Activity:      Days of Exercise per Week:      Minutes of Exercise per Session:    Stress:      Feeling of Stress :    Social Connections:      Frequency of Communication with Friends and Family:      Frequency of Social Gatherings with Friends and Family:      Attends [employer] Services:      Active Member of Clubs or Organizations:      Attends Club or Organization Meetings:      Marital Status:    Intimate Partner Violence:      Fear of Current or Ex-Partner:      Emotionally Abused:      Physically Abused:      Sexually Abused:        Family History -   Family History   Problem  "Relation Age of Onset     Allergies Father         iodine     Heart Disease Father         heart attack at 59 - CABG at 60     Cerebrovascular Disease Father      Cancer Mother         colon -  at age 56     Cancer Sister         cervix -  at age 38     Neurologic Disorder Sister         CNS bleed x 2 sisters     Prostate Cancer Brother 58     Other Cancer Brother 62        stage 4 liver cancer     Colon Cancer Sister 68       Review of Systems - As per HPI and PMHx, otherwise review of system review of the head and neck negative. Otherwise 10+ review of system is negative    Physical Exam  /68   Temp 98  F (36.7  C) (Temporal)   Ht 1.81 m (5' 11.26\")   Wt 93.7 kg (206 lb 8 oz)   BMI 28.59 kg/m    BMI: Body mass index is 28.59 kg/m .    General - The patient is well nourished and well developed, and appears to have good nutritional status.  Alert and oriented to person and place, answers questions and cooperates with examination appropriately.    SKIN - No suspicious lesions or rashes.  Respiration - No respiratory distress.  Head and Face - Normocephalic and atraumatic, with no gross asymmetry noted of the contour of the facial features.  The facial nerve is intact, with strong symmetric movements.    Voice and Breathing - The patient was breathing comfortably without the use of accessory muscles. The patients voice was clear and strong, and had appropriate pitch and quality.    Ears - Bilateral pinna and EACs with normal appearing overlying skin. Tympanic membrane intact with good mobility on pneumatic otoscopy bilaterally. Bony landmarks of the ossicular chain are normal. The tympanic membranes are normal in appearance. No retraction, perforation, or masses.  No fluid or purulence was seen in the external canal or the middle ear.     Eyes - Extraocular movements intact.  Sclera were not icteric or injected, conjunctiva were pink and moist.    Mouth - Examination of the oral cavity showed " pink, healthy oral mucosa. No lesions or ulcerations noted.  The tongue was mobile and midline, and the dentition were in good condition.      Throat - The walls of the oropharynx were smooth, pink, moist, symmetric, and had no lesions or ulcerations.  The tonsillar pillars and soft palate were symmetric.  The uvula was midline on elevation.    Neck - Normal midline excursion of the laryngotracheal complex during swallowing.  Full range of motion on passive movement.  Palpation of the occipital, submental, submandibular, internal jugular chain, and supraclavicular nodes did not demonstrate any abnormal lymph nodes or masses.  The carotid pulse was palpable bilaterally.  Palpation of the thyroid was soft and smooth, with no nodules or goiter appreciated.  The trachea was mobile and midline.    Nose - External contour is symmetric, no gross deflection or scars.  Nasal mucosa is pink and moist with no abnormal mucus.  The septum was midline and non-obstructive, turbinates of normal size and position.  No polyps, masses, or purulence noted on examination.    Neuro - Nonfocal neuro exam is normal, CN 2 through 12 intact, normal gait and muscle tone.      Performed in clinic today:    First procedure performed was cerumen disimpaction on the right side.  Under the guidance of magnified speculum large amount of cerumen is removed from the ear canal using cerumen curette and suction.  Patient tolerated procedure well.  Appreciate the rest of the canal being clear and tympanic membrane being clear and mobile.  Attempts at mirror laryngoscopy were not possible due to gag reflex.  Therefore I proceeded with a fiberoptic examination.  First I sprayed both sides of the nose with a mixture of lidocaine and neosynephrine.  I then passed the scope through the nasal cavity.  The nasal cavity was unremarkable.  The nasopharynx was mucosally covered and symmetric.  The Eustachian tube openings were unobstructed.  Going further down I  had a clear view of the base of tongue which had normal appearing lingual tonsillar tissue.  The base of tongue was free of lesions, and the vallecula was open.  The epiglottis was smooth and mucosally covered.  The supraglottic larynx was then clearly visualized.  The vocal cords moved smoothly and symmetrically, they were pearly white and no lesions were seen.  The pyriform sinuses were open, and the limited view of the postcricoid region did not show any lesions.  There is little bit of prominence of the lingual tonsil.  Mild thickening seen in the posterior commissure area.  Light blue - [id number] Optim ENTity      A/P - Rebel Garcia is a 56 year old male presents with nonspecific globus sensation gagging some GERD symptomatology all those characteristics likely consistent with laryngopharyngeal reflux disease.  His exam did not reveal any specific causes for trace of blood in the sputum.  Fiberoptic laryngoscopy essentially confirmed possibly early reflux related changes.  At this point we have a lengthy discussion regards to reflux related lifestyle changes that is dietary restrictions avoiding late night eating.  Counseled the patient to avoid heavy greasy fried foods towards the evening as well as caffeinated products after 5 PM.  Alcohol at night can also stimulate more acid production.  We discussed on the torso elevation while sleeping.  Finally we talked about the trial of omeprazole 40 mg prior to supper.  Patient will follow up in about 6 to 8 weeks to see how he is doing.      Jude Vega MD

## 2021-09-20 NOTE — LETTER
9/20/2021         RE: Rebel Garcia  [id number] [facility] [address]        Dear Colleague,    Thank you for referring your patient, Rebel Garcia, to the [facility]. Please see a copy of my visit note below.    ENT Consultation    Rebel Garcia who is a 56 year old male seen in consultation at the request of Marilu Gomez PA-C.      History of Present Illness - Rebel Garcia is a 56 year old male presents with 2 issues the main one being that when he wakes in the morning he experiences globus sensation gags coughs a lot and then produce few traces of blood.  Started about a month ago.  He is not sure but may have to change his diet to later time eating supper he has had some GERD symptoms for about a month or so.  Denies any nasal symptomatology of congestion postnasal drainage or allergies.  Denies any voice related changes.  No dyspnea no dysphagia.    Second issue problems with cerumen impaction on the right side.  Ear always feels plugged with wax.  Body mass index is 28.59 kg/m .      BP Readings from Last 1 Encounters:   09/20/21 120/68       BP noted to be well controlled today in office.     Rebel IS NOT a smoker/uses chewing tobacco.  Patient already quit smoking.    Past Medical History -   Past Medical History:   Diagnosis Date     Gastroesophageal reflux disease without esophagitis 12/06/2018     Hyperlipidemia LDL goal <130 12/06/2018     Migraine, unspecified, without mention of intractable migraine without mention of status migrainosus      Orthostatic syncope      Sleep apnea        Current Medications -   Current Outpatient Medications:      escitalopram (LEXAPRO) 20 MG tablet, Take 1 tablet (20 mg) by mouth daily, Disp: 90 tablet, Rfl: 1     tamsulosin (FLOMAX) 0.4 MG capsule, Take 1 capsule (0.4 mg) by mouth daily, Disp: 90 capsule, Rfl: 3    Allergies -   Allergies   Allergen Reactions     No Known Allergies        Social History -   Social  History     Socioeconomic History     Marital status:      Spouse name: Not on file     Number of children: 2     Years of education: Not on file     Highest education level: Not on file   Occupational History     Occupation: [employer]     Employer: [id number]   Tobacco Use     Smoking status: Former Smoker     Packs/day: 0.75     Years: 18.00     Pack years: 13.50     Types: Cigarettes     Quit date: 2/14/2006     Years since quitting: 15.6     Smokeless tobacco: Never Used   Vaping Use     Vaping Use: Never used   Substance and Sexual Activity     Alcohol use: Yes     Comment: occ     Drug use: No     Sexual activity: Yes     Partners: Female     Birth control/protection: Pill     Comment: S.O. on birth control pills   Other Topics Concern      Service No     Blood Transfusions No     Caffeine Concern Yes     Comment: 2 pots of coffee per day     Occupational Exposure Yes     Comment: [employer] Hazards No     Sleep Concern Yes     Comment: recently related to anxiety     Stress Concern No     Weight Concern No     Special Diet No     Back Care No     Exercise Yes     Bike Helmet Yes     Seat Belt Yes     Self-Exams No     Parent/sibling w/ CABG, MI or angioplasty before 65F 55M? No   Social History Narrative     Not on file     Social Determinants of Health     Financial Resource Strain:      Difficulty of Paying Living Expenses:    Food Insecurity:      Worried About Running Out of Food in the Last Year:      Ran Out of Food in the Last Year:    Transportation Needs:      Lack of Transportation (Medical):      Lack of Transportation (Non-Medical):    Physical Activity:      Days of Exercise per Week:      Minutes of Exercise per Session:    Stress:      Feeling of Stress :    Social Connections:      Frequency of Communication with Friends and Family:      Frequency of Social Gatherings with Friends and Family:      Attends [employer] Services:      Active Member of Clubs or  "Organizations:      Attends Club or Organization Meetings:      Marital Status:    Intimate Partner Violence:      Fear of Current or Ex-Partner:      Emotionally Abused:      Physically Abused:      Sexually Abused:        Family History -   Family History   Problem Relation Age of Onset     Allergies Father         iodine     Heart Disease Father         heart attack at 59 - CABG at 60     Cerebrovascular Disease Father      Cancer Mother         colon -  at age 56     Cancer Sister         cervix -  at age 38     Neurologic Disorder Sister         CNS bleed x 2 sisters     Prostate Cancer Brother 58     Other Cancer Brother 62        stage 4 liver cancer     Colon Cancer Sister 68       Review of Systems - As per HPI and PMHx, otherwise review of system review of the head and neck negative. Otherwise 10+ review of system is negative    Physical Exam  /68   Temp 98  F (36.7  C) (Temporal)   Ht 1.81 m (5' 11.26\")   Wt 93.7 kg (206 lb 8 oz)   BMI 28.59 kg/m    BMI: Body mass index is 28.59 kg/m .    General - The patient is well nourished and well developed, and appears to have good nutritional status.  Alert and oriented to person and place, answers questions and cooperates with examination appropriately.    SKIN - No suspicious lesions or rashes.  Respiration - No respiratory distress.  Head and Face - Normocephalic and atraumatic, with no gross asymmetry noted of the contour of the facial features.  The facial nerve is intact, with strong symmetric movements.    Voice and Breathing - The patient was breathing comfortably without the use of accessory muscles. The patients voice was clear and strong, and had appropriate pitch and quality.    Ears - Bilateral pinna and EACs with normal appearing overlying skin. Tympanic membrane intact with good mobility on pneumatic otoscopy bilaterally. Bony landmarks of the ossicular chain are normal. The tympanic membranes are normal in appearance. No " retraction, perforation, or masses.  No fluid or purulence was seen in the external canal or the middle ear.     Eyes - Extraocular movements intact.  Sclera were not icteric or injected, conjunctiva were pink and moist.    Mouth - Examination of the oral cavity showed pink, healthy oral mucosa. No lesions or ulcerations noted.  The tongue was mobile and midline, and the dentition were in good condition.      Throat - The walls of the oropharynx were smooth, pink, moist, symmetric, and had no lesions or ulcerations.  The tonsillar pillars and soft palate were symmetric.  The uvula was midline on elevation.    Neck - Normal midline excursion of the laryngotracheal complex during swallowing.  Full range of motion on passive movement.  Palpation of the occipital, submental, submandibular, internal jugular chain, and supraclavicular nodes did not demonstrate any abnormal lymph nodes or masses.  The carotid pulse was palpable bilaterally.  Palpation of the thyroid was soft and smooth, with no nodules or goiter appreciated.  The trachea was mobile and midline.    Nose - External contour is symmetric, no gross deflection or scars.  Nasal mucosa is pink and moist with no abnormal mucus.  The septum was midline and non-obstructive, turbinates of normal size and position.  No polyps, masses, or purulence noted on examination.    Neuro - Nonfocal neuro exam is normal, CN 2 through 12 intact, normal gait and muscle tone.      Performed in clinic today:    First procedure performed was cerumen disimpaction on the right side.  Under the guidance of magnified speculum large amount of cerumen is removed from the ear canal using cerumen curette and suction.  Patient tolerated procedure well.  Appreciate the rest of the canal being clear and tympanic membrane being clear and mobile.  Attempts at mirror laryngoscopy were not possible due to gag reflex.  Therefore I proceeded with a fiberoptic examination.  First I sprayed both sides  of the nose with a mixture of lidocaine and neosynephrine.  I then passed the scope through the nasal cavity.  The nasal cavity was unremarkable.  The nasopharynx was mucosally covered and symmetric.  The Eustachian tube openings were unobstructed.  Going further down I had a clear view of the base of tongue which had normal appearing lingual tonsillar tissue.  The base of tongue was free of lesions, and the vallecula was open.  The epiglottis was smooth and mucosally covered.  The supraglottic larynx was then clearly visualized.  The vocal cords moved smoothly and symmetrically, they were pearly white and no lesions were seen.  The pyriform sinuses were open, and the limited view of the postcricoid region did not show any lesions.  There is little bit of prominence of the lingual tonsil.  Mild thickening seen in the posterior commissure area.  Light blue - [id number] Optim ENTity      A/P - Rebel Garcia is a 56 year old male presents with nonspecific globus sensation gagging some GERD symptomatology all those characteristics likely consistent with laryngopharyngeal reflux disease.  His exam did not reveal any specific causes for trace of blood in the sputum.  Fiberoptic laryngoscopy essentially confirmed possibly early reflux related changes.  At this point we have a lengthy discussion regards to reflux related lifestyle changes that is dietary restrictions avoiding late night eating.  Counseled the patient to avoid heavy greasy fried foods towards the evening as well as caffeinated products after 5 PM.  Alcohol at night can also stimulate more acid production.  We discussed on the torso elevation while sleeping.  Finally we talked about the trial of omeprazole 40 mg prior to supper.  Patient will follow up in about 6 to 8 weeks to see how he is doing.      Jude Vega MD      Again, thank you for allowing me to participate in the care of your patient.        Sincerely,        Jude Vega MD, MD

## 2021-09-21 ENCOUNTER — MYC MEDICAL ADVICE (OUTPATIENT)
Dept: OTOLARYNGOLOGY | Facility: CLINIC | Age: 56
End: 2021-09-21

## 2021-09-21 DIAGNOSIS — K21.9 LPRD (LARYNGOPHARYNGEAL REFLUX DISEASE): Primary | ICD-10-CM

## 2021-09-21 NOTE — TELEPHONE ENCOUNTER
Spoke with Rick SHAH, she said insurance is not covering 40 mg Omeprazole. They will cover 20 mg. Omeprazole but only once daily. Would you like to change the dose on this or just change to a different medication?  Thanks!  Kezia Oglesby, [facility]

## 2021-09-22 RX ORDER — PANTOPRAZOLE SODIUM 40 MG/1
40 TABLET, DELAYED RELEASE ORAL DAILY
Qty: 30 TABLET | Refills: 0 | Status: SHIPPED | OUTPATIENT
Start: 2021-09-22 | End: 2021-10-22

## 2021-09-24 DIAGNOSIS — K21.9 LPRD (LARYNGOPHARYNGEAL REFLUX DISEASE): ICD-10-CM

## 2021-09-24 RX ORDER — PANTOPRAZOLE SODIUM 40 MG/1
40 TABLET, DELAYED RELEASE ORAL DAILY
Qty: 30 TABLET | Refills: 0 | Status: CANCELLED | OUTPATIENT
Start: 2021-09-24

## 2021-09-24 RX ORDER — OMEPRAZOLE 40 MG/1
40 CAPSULE, DELAYED RELEASE ORAL DAILY
Qty: 30 CAPSULE | Refills: 3 | Status: CANCELLED | OUTPATIENT
Start: 2021-09-24

## 2021-09-24 NOTE — TELEPHONE ENCOUNTER
Omeprazole  Last Written Prescription Date:  9/20/21  Last Fill Quantity: 30capsules,  # refills: 3   Last office visit: 9/20/2021 with prescribing provider:     Future Office Visit:   Next 5 appointments (look out 90 days)    Nov 22, 2021  3:30 PM  Return Visit with Jude Vega MD  [facility] ([facility] ) [address] [place] [id number]  [phone]         Pantoprazole  Last Written Prescription Date: 9/22/21  Last Fill Quantity: 30tabs ,  # refills: 0   Last office visit: 9/20/2021 with prescribing provider:    Future Office Visit:   Next 5 appointments (look out 90 days)    Nov 22, 2021  3:30 PM  Return Visit with Jude Vega MD  [facility] ([facility] ) [address] [place] [id number]  [phone]             Ayla Guy on 9/24/2021 at 7:49 AM

## 2021-10-22 DIAGNOSIS — F41.9 ANXIETY: ICD-10-CM

## 2021-10-22 RX ORDER — ESCITALOPRAM OXALATE 20 MG/1
TABLET ORAL
Qty: 90 TABLET | Refills: 1 | Status: SHIPPED | OUTPATIENT
Start: 2021-10-22 | End: 2022-04-11

## 2021-10-22 NOTE — TELEPHONE ENCOUNTER
Prescription approved per [facility] Refill Protocol.  ROSA ContehN, RN, PHN  [place]  October 22, 2021

## 2021-10-23 ENCOUNTER — HEALTH MAINTENANCE LETTER (OUTPATIENT)
Age: 56
End: 2021-10-23

## 2021-11-01 ENCOUNTER — MYC MEDICAL ADVICE (OUTPATIENT)
Dept: OTOLARYNGOLOGY | Facility: CLINIC | Age: 56
End: 2021-11-01

## 2021-11-01 ENCOUNTER — OFFICE VISIT (OUTPATIENT)
Dept: OTOLARYNGOLOGY | Facility: CLINIC | Age: 56
End: 2021-11-01
Payer: COMMERCIAL

## 2021-11-01 VITALS
DIASTOLIC BLOOD PRESSURE: 70 MMHG | SYSTOLIC BLOOD PRESSURE: 124 MMHG | WEIGHT: 207.5 LBS | HEIGHT: 71 IN | TEMPERATURE: 97.6 F | BODY MASS INDEX: 29.05 KG/M2

## 2021-11-01 DIAGNOSIS — M54.2 CERVICALGIA: ICD-10-CM

## 2021-11-01 DIAGNOSIS — R22.1 NECK FULLNESS: Primary | ICD-10-CM

## 2021-11-01 PROCEDURE — 99213 OFFICE O/P EST LOW 20 MIN: CPT | Mod: 25 | Performed by: OTOLARYNGOLOGY

## 2021-11-01 PROCEDURE — 31575 DIAGNOSTIC LARYNGOSCOPY: CPT | Performed by: OTOLARYNGOLOGY

## 2021-11-01 ASSESSMENT — MIFFLIN-ST. JEOR: SCORE: 1797.46

## 2021-11-01 NOTE — LETTER
"    11/1/2021         RE: Rebel Garcia  [id number] [facility] [id number]        Dear Colleague,    Thank you for referring your patient, Rebel Garcia, to the [facility]. Please see a copy of my visit note below.    History of Present Illness - Rebel Garcia is a 56 year old male presenting in clinic today for a recheck on Patient presents with:  Follow Up    Patient presented initially with nonspecific feeling of discomfort in his throat more of a globus sensation.  She had some nonspecific GERD symptoms as well.  He was started on LPR D therapy with omeprazole 40 mg before supper and took a proper dietary and lifestyle change precautions.  However he feels his symptoms in the last month and a half have exacerbated.  Pain is localized more to the right side right of the thyroid cartilage as well as the feeling of foreign body sensation almost like \"popcorn\" in that area and pain from dull to sharp through the neck.  No voice related changes no further hemoptysis.  No true dysphagia.      BP Readings from Last 1 Encounters:   11/01/21 124/70       BP noted to be well controlled today in office.     Rebel IS NOT a smoker/uses chewing tobacco.      Past Medical History -   Past Medical History:   Diagnosis Date     Gastroesophageal reflux disease without esophagitis 12/06/2018     Hyperlipidemia LDL goal <130 12/06/2018     Migraine, unspecified, without mention of intractable migraine without mention of status migrainosus      Orthostatic syncope      Sleep apnea        Current Medications -   Current Outpatient Medications:      escitalopram (LEXAPRO) 20 MG tablet, TAKE 1 TABLET(20 MG) BY MOUTH DAILY, Disp: 90 tablet, Rfl: 1     tamsulosin (FLOMAX) 0.4 MG capsule, Take 1 capsule (0.4 mg) by mouth daily, Disp: 90 capsule, Rfl: 3    Allergies -   Allergies   Allergen Reactions     No Known Allergies        Social History -   Social History     Socioeconomic History     Marital " status:      Spouse name: None     Number of children: 2     Years of education: None     Highest education level: None   Occupational History     Occupation: [employer]     Employer: [employer]   Tobacco Use     Smoking status: Former Smoker     Packs/day: 0.75     Years: 18.00     Pack years: 13.50     Types: Cigarettes     Quit date: 2/14/2006     Years since quitting: 15.7     Smokeless tobacco: Never Used   Vaping Use     Vaping Use: Never used   Substance and Sexual Activity     Alcohol use: Yes     Comment: occ     Drug use: No     Sexual activity: Yes     Partners: Female     Birth control/protection: Pill     Comment: S.O. on birth control pills   Other Topics Concern      Service No     Blood Transfusions No     Caffeine Concern Yes     Comment: 2 pots of coffee per day     Occupational Exposure Yes     Comment: [employer] Hazards No     Sleep Concern Yes     Comment: recently related to anxiety     Stress Concern No     Weight Concern No     Special Diet No     Back Care No     Exercise Yes     Bike Helmet Yes     Seat Belt Yes     Self-Exams No     Parent/sibling w/ CABG, MI or angioplasty before 65F 55M? No   Social History Narrative     None     Social Determinants of Health     Financial Resource Strain:      Difficulty of Paying Living Expenses:    Food Insecurity:      Worried About Running Out of Food in the Last Year:      Ran Out of Food in the Last Year:    Transportation Needs:      Lack of Transportation (Medical):      Lack of Transportation (Non-Medical):    Physical Activity:      Days of Exercise per Week:      Minutes of Exercise per Session:    Stress:      Feeling of Stress :    Social Connections:      Frequency of Communication with Friends and Family:      Frequency of Social Gatherings with Friends and Family:      Attends Yazidi Services:      Active Member of Clubs or Organizations:      Attends Club or Organization Meetings:      Marital Status:   "  Intimate Partner Violence:      Fear of Current or Ex-Partner:      Emotionally Abused:      Physically Abused:      Sexually Abused:        Family History -   Family History   Problem Relation Age of Onset     Allergies Father         iodine     Heart Disease Father         heart attack at 59 - CABG at 60     Cerebrovascular Disease Father      Cancer Mother         colon -  at age 56     Cancer Sister         cervix -  at age 38     Neurologic Disorder Sister         CNS bleed x 2 sisters     Prostate Cancer Brother 58     Other Cancer Brother 62        stage 4 liver cancer     Colon Cancer Sister 68       Review of Systems - As per HPI and PMHx, otherwise review of system review of the head and neck negative. Otherwise 10+ review of system is negative    Physical Exam  /70   Temp 97.6  F (36.4  C) (Temporal)   Ht 1.81 m (5' 11.26\")   Wt 94.1 kg (207 lb 8 oz)   BMI 28.73 kg/m    BMI: Body mass index is 28.73 kg/m .    General - The patient is well nourished and well developed, and appears to have good nutritional status.  Alert and oriented to person and place, answers questions and cooperates with examination appropriately.    SKIN - No suspicious lesions or rashes.  Respiration - No respiratory distress.  Head and Face - Normocephalic and atraumatic, with no gross asymmetry noted of the contour of the facial features.  The facial nerve is intact, with strong symmetric movements.    Voice and Breathing - The patient was breathing comfortably without the use of accessory muscles. The patients voice was clear and strong, and had appropriate pitch and quality.    Ears - Bilateral pinna and EACs with normal appearing overlying skin. Tympanic membrane intact with good mobility on pneumatic otoscopy bilaterally. Bony landmarks of the ossicular chain are normal. The tympanic membranes are normal in appearance. No retraction, perforation, or masses.  No fluid or purulence was seen in the " external canal or the middle ear.     Eyes - Extraocular movements intact.  Sclera were not icteric or injected, conjunctiva were pink and moist.    Mouth - Examination of the oral cavity showed pink, healthy oral mucosa. No lesions or ulcerations noted.  The tongue was mobile and midline, and the dentition were in good condition.      Throat - The walls of the oropharynx were smooth, pink, moist, symmetric, and had no lesions or ulcerations.  The tonsillar pillars and soft palate were symmetric. Tonsils are symmetric and 2+. The uvula was midline on elevation.    Neck - Normal midline excursion of the laryngotracheal complex during swallowing.  Full range of motion on passive movement.  Palpation of the occipital, submental, submandibular, internal jugular chain, and supraclavicular nodes did not demonstrate any abnormal lymph nodes or masses.  The carotid pulse was palpable bilaterally.  Palpation of the thyroid was soft and smooth, with no nodules or goiter appreciated.  The trachea was mobile and midline.  However there was significant discomfort on palpation lateral to the thyroid cartilage on the right side all sharp pain reproducible pain with some tightness in the sternocleidomastoid anteriorly.    Nose - External contour is symmetric, no gross deflection or scars.  Nasal mucosa is pink and moist with no abnormal mucus.  The septum was midline and non-obstructive, turbinates of normal size and position.  No polyps, masses, or purulence noted on examination.    Neuro - Nonfocal neuro exam is normal, CN 2 through 12 intact, normal gait and muscle tone.      Performed in clinic today:  Attempts at mirror laryngoscopy were not possible due to gag reflex.  Therefore I proceeded with a fiberoptic examination.  First I sprayed both sides of the nose with a mixture of lidocaine and neosynephrine.  I then passed the scope through the nasal cavity.  The nasal cavity was unremarkable.  The nasopharynx was mucosally  covered and symmetric.  The Eustachian tube openings were unobstructed.  Going further down I had a clear view of the base of tongue which had normal appearing lingual tonsillar tissue.  The base of tongue was free of lesions, and the vallecula was open.  The epiglottis was smooth and mucosally covered.  The supraglottic larynx was then clearly visualized.  The vocal cords moved smoothly and symmetrically, they were pearly white and no lesions were seen.  The pyriform sinuses were open, and the limited view of the postcricoid region did not show any lesions. Dark Blue - [id number] [facility]      A/P - Rebel Garcia is a 56 year old male Patient presents with:  Follow Up    With nonspecific localized symptoms in the right side of the neck cervicalgia fullness globus.  Considering prior history of smoking even though no specific visible palpable pathology other than reproducible pain would like to get a CT of the soft tissues of the neck with and without contrast.  We will have a virtual visit with the patient next week to discuss the results of the scan and also path forward depending on the findings of the scan.        Jude Vega MD            Again, thank you for allowing me to participate in the care of your patient.        Sincerely,        Jude Vega MD, MD

## 2021-11-01 NOTE — PROGRESS NOTES
"History of Present Illness - Rebel Garcia is a 56 year old male presenting in clinic today for a recheck on Patient presents with:  Follow Up    Patient presented initially with nonspecific feeling of discomfort in his throat more of a globus sensation.  She had some nonspecific GERD symptoms as well.  He was started on LPR D therapy with omeprazole 40 mg before supper and took a proper dietary and lifestyle change precautions.  However he feels his symptoms in the last month and a half have exacerbated.  Pain is localized more to the right side right of the thyroid cartilage as well as the feeling of foreign body sensation almost like \"popcorn\" in that area and pain from dull to sharp through the neck.  No voice related changes no further hemoptysis.  No true dysphagia.      BP Readings from Last 1 Encounters:   11/01/21 124/70       BP noted to be well controlled today in office.     Rebel IS NOT a smoker/uses chewing tobacco.      Past Medical History -   Past Medical History:   Diagnosis Date     Gastroesophageal reflux disease without esophagitis 12/06/2018     Hyperlipidemia LDL goal <130 12/06/2018     Migraine, unspecified, without mention of intractable migraine without mention of status migrainosus      Orthostatic syncope      Sleep apnea        Current Medications -   Current Outpatient Medications:      escitalopram (LEXAPRO) 20 MG tablet, TAKE 1 TABLET(20 MG) BY MOUTH DAILY, Disp: 90 tablet, Rfl: 1     tamsulosin (FLOMAX) 0.4 MG capsule, Take 1 capsule (0.4 mg) by mouth daily, Disp: 90 capsule, Rfl: 3    Allergies -   Allergies   Allergen Reactions     No Known Allergies        Social History -   Social History     Socioeconomic History     Marital status:      Spouse name: None     Number of children: 2     Years of education: None     Highest education level: None   Occupational History     Occupation: [employer]     Employer: [employer]   Tobacco Use     Smoking status: Former " Smoker     Packs/day: 0.75     Years: 18.00     Pack years: 13.50     Types: Cigarettes     Quit date: 2/14/2006     Years since quitting: 15.7     Smokeless tobacco: Never Used   Vaping Use     Vaping Use: Never used   Substance and Sexual Activity     Alcohol use: Yes     Comment: occ     Drug use: No     Sexual activity: Yes     Partners: Female     Birth control/protection: Pill     Comment: S.O. on birth control pills   Other Topics Concern      Service No     Blood Transfusions No     Caffeine Concern Yes     Comment: 2 pots of coffee per day     Occupational Exposure Yes     Comment: [employer] Hazards No     Sleep Concern Yes     Comment: recently related to anxiety     Stress Concern No     Weight Concern No     Special Diet No     Back Care No     Exercise Yes     Bike Helmet Yes     Seat Belt Yes     Self-Exams No     Parent/sibling w/ CABG, MI or angioplasty before 65F 55M? No   Social History Narrative     None     Social Determinants of Health     Financial Resource Strain:      Difficulty of Paying Living Expenses:    Food Insecurity:      Worried About Running Out of Food in the Last Year:      Ran Out of Food in the Last Year:    Transportation Needs:      Lack of Transportation (Medical):      Lack of Transportation (Non-Medical):    Physical Activity:      Days of Exercise per Week:      Minutes of Exercise per Session:    Stress:      Feeling of Stress :    Social Connections:      Frequency of Communication with Friends and Family:      Frequency of Social Gatherings with Friends and Family:      Attends Adventist Services:      Active Member of Clubs or Organizations:      Attends Club or Organization Meetings:      Marital Status:    Intimate Partner Violence:      Fear of Current or Ex-Partner:      Emotionally Abused:      Physically Abused:      Sexually Abused:        Family History -   Family History   Problem Relation Age of Onset     Allergies Father         iodine      "Heart Disease Father         heart attack at 59 - CABG at 60     Cerebrovascular Disease Father      Cancer Mother         colon -  at age 56     Cancer Sister         cervix -  at age 38     Neurologic Disorder Sister         CNS bleed x 2 sisters     Prostate Cancer Brother 58     Other Cancer Brother 62        stage 4 liver cancer     Colon Cancer Sister 68       Review of Systems - As per HPI and PMHx, otherwise review of system review of the head and neck negative. Otherwise 10+ review of system is negative    Physical Exam  /70   Temp 97.6  F (36.4  C) (Temporal)   Ht 1.81 m (5' 11.26\")   Wt 94.1 kg (207 lb 8 oz)   BMI 28.73 kg/m    BMI: Body mass index is 28.73 kg/m .    General - The patient is well nourished and well developed, and appears to have good nutritional status.  Alert and oriented to person and place, answers questions and cooperates with examination appropriately.    SKIN - No suspicious lesions or rashes.  Respiration - No respiratory distress.  Head and Face - Normocephalic and atraumatic, with no gross asymmetry noted of the contour of the facial features.  The facial nerve is intact, with strong symmetric movements.    Voice and Breathing - The patient was breathing comfortably without the use of accessory muscles. The patients voice was clear and strong, and had appropriate pitch and quality.    Ears - Bilateral pinna and EACs with normal appearing overlying skin. Tympanic membrane intact with good mobility on pneumatic otoscopy bilaterally. Bony landmarks of the ossicular chain are normal. The tympanic membranes are normal in appearance. No retraction, perforation, or masses.  No fluid or purulence was seen in the external canal or the middle ear.     Eyes - Extraocular movements intact.  Sclera were not icteric or injected, conjunctiva were pink and moist.    Mouth - Examination of the oral cavity showed pink, healthy oral mucosa. No lesions or ulcerations noted. "  The tongue was mobile and midline, and the dentition were in good condition.      Throat - The walls of the oropharynx were smooth, pink, moist, symmetric, and had no lesions or ulcerations.  The tonsillar pillars and soft palate were symmetric. Tonsils are symmetric and 2+. The uvula was midline on elevation.    Neck - Normal midline excursion of the laryngotracheal complex during swallowing.  Full range of motion on passive movement.  Palpation of the occipital, submental, submandibular, internal jugular chain, and supraclavicular nodes did not demonstrate any abnormal lymph nodes or masses.  The carotid pulse was palpable bilaterally.  Palpation of the thyroid was soft and smooth, with no nodules or goiter appreciated.  The trachea was mobile and midline.  However there was significant discomfort on palpation lateral to the thyroid cartilage on the right side all sharp pain reproducible pain with some tightness in the sternocleidomastoid anteriorly.    Nose - External contour is symmetric, no gross deflection or scars.  Nasal mucosa is pink and moist with no abnormal mucus.  The septum was midline and non-obstructive, turbinates of normal size and position.  No polyps, masses, or purulence noted on examination.    Neuro - Nonfocal neuro exam is normal, CN 2 through 12 intact, normal gait and muscle tone.      Performed in clinic today:  Attempts at mirror laryngoscopy were not possible due to gag reflex.  Therefore I proceeded with a fiberoptic examination.  First I sprayed both sides of the nose with a mixture of lidocaine and neosynephrine.  I then passed the scope through the nasal cavity.  The nasal cavity was unremarkable.  The nasopharynx was mucosally covered and symmetric.  The Eustachian tube openings were unobstructed.  Going further down I had a clear view of the base of tongue which had normal appearing lingual tonsillar tissue.  The base of tongue was free of lesions, and the vallecula was open.   The epiglottis was smooth and mucosally covered.  The supraglottic larynx was then clearly visualized.  The vocal cords moved smoothly and symmetrically, they were pearly white and no lesions were seen.  The pyriform sinuses were open, and the limited view of the postcricoid region did not show any lesions. Dark Blue - [id number] [facility]      A/P - Rebel Garcia is a 56 year old male Patient presents with:  Follow Up    With nonspecific localized symptoms in the right side of the neck cervicalgia fullness globus.  Considering prior history of smoking even though no specific visible palpable pathology other than reproducible pain would like to get a CT of the soft tissues of the neck with and without contrast.  We will have a virtual visit with the patient next week to discuss the results of the scan and also path forward depending on the findings of the scan.        Jude Vega MD

## 2021-11-04 ENCOUNTER — ANCILLARY PROCEDURE (OUTPATIENT)
Dept: CT IMAGING | Facility: CLINIC | Age: 56
End: 2021-11-04
Attending: OTOLARYNGOLOGY
Payer: COMMERCIAL

## 2021-11-04 DIAGNOSIS — M54.2 CERVICALGIA: ICD-10-CM

## 2021-11-04 DIAGNOSIS — R22.1 NECK FULLNESS: ICD-10-CM

## 2021-11-04 PROCEDURE — 70491 CT SOFT TISSUE NECK W/DYE: CPT | Performed by: RADIOLOGY

## 2021-11-04 RX ORDER — IOPAMIDOL 755 MG/ML
100 INJECTION, SOLUTION INTRAVASCULAR ONCE
Status: COMPLETED | OUTPATIENT
Start: 2021-11-04 | End: 2021-11-04

## 2021-11-04 RX ADMIN — IOPAMIDOL 100 ML: 755 INJECTION, SOLUTION INTRAVASCULAR at 12:28

## 2021-11-05 NOTE — PROGRESS NOTES
Rebel is a 56 year old who is being evaluated via telephone visit.        Telephone visit start Time: 11:01        Vitals:  No vitals were obtained today due to virtual visit.    Telephone Visit     Type of service:  telephone visit     Telephone visit end Time: 11: 17    Distant Location (provider location):  [facility]     History of Present Illness - Rebel Garcia is a 56 year old male presenting in clinic today for a recheck on Patient presents with:  Follow Up: CT results  CT SOFT TISSUE NECK W CONTRAST 11/4/2021 12:43 PM     History:  Neck mass, initial workup; Neck fullness; Cervicalgia  ICD-10: Neck fullness; Cervicalgia      Comparison:  None available      Technique: Following intravenous administration of nonionic iodinated  contrast medium, thin section helical CT images were obtained from the  skull base down to the level of the aortic arch.  Axial, coronal and  sagittal reformations were performed with 2-3 mm slice thickness  reconstruction. Images were reviewed in soft tissue, lung and bone  windows.     Contrast: 100 ml isovue 370     Findings:   Evaluation of the mucosal space demonstrates no evident abnormality in  the nasopharynx, oropharynx, hypopharynx or the glottis. There are  increased since in the palatine tonsils bilaterally which are likely  from prior inflammation. The tongue base appears normal. The major  salivary glands appear unremarkable. The thyroid gland appears normal.     There is no evident cervical lymphadenopathy. The fascial spaces in  the neck are intact bilaterally. The major vascular structures in the  neck appear unremarkable.     Evaluation of the osseous structures demonstrate no worrisome lytic or  sclerotic lesion. No overt spinal canal or neuroforaminal stenosis.  Mucous retention cyst in left maxillary sinus. The mastoid air cells  are clear.      The visualized lung apices are clear.                                                                       Impression:  Normal CT study of the neck with contrast.  No evident mass or  adenopathy within the neck.      TAL KATE MD            Present Symptoms include: Still some discomfort on swallowing on the right side with a foreign body sensation.  It has not significantly changed recently.    There is no height or weight on file to calculate BMI.        BP Readings from Last 1 Encounters:   11/01/21 124/70       Telephone visit           Past Medical History -   Past Medical History:   Diagnosis Date     Gastroesophageal reflux disease without esophagitis 12/06/2018     Hyperlipidemia LDL goal <130 12/06/2018     Migraine, unspecified, without mention of intractable migraine without mention of status migrainosus      Orthostatic syncope      Sleep apnea        Current Medications -   Current Outpatient Medications:      escitalopram (LEXAPRO) 20 MG tablet, TAKE 1 TABLET(20 MG) BY MOUTH DAILY, Disp: 90 tablet, Rfl: 1     tamsulosin (FLOMAX) 0.4 MG capsule, Take 1 capsule (0.4 mg) by mouth daily, Disp: 90 capsule, Rfl: 3  No current facility-administered medications for this visit.    Allergies -   Allergies   Allergen Reactions     No Known Allergies        Social History -   Social History     Socioeconomic History     Marital status:      Spouse name: Not on file     Number of children: 2     Years of education: Not on file     Highest education level: Not on file   Occupational History     Occupation: [employer]     Employer: [employer] [place]   Tobacco Use     Smoking status: Former Smoker     Packs/day: 0.75     Years: 18.00     Pack years: 13.50     Types: Cigarettes     Quit date: 2/14/2006     Years since quitting: 15.7     Smokeless tobacco: Never Used   Vaping Use     Vaping Use: Never used   Substance and Sexual Activity     Alcohol use: Yes     Comment: occ     Drug use: No     Sexual activity: Yes     Partners: Female     Birth control/protection: Pill     Comment: S.O. 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 No   Social History Narrative     Not on file     Social Determinants of Health     Financial Resource Strain:      Difficulty of Paying Living Expenses:    Food Insecurity:      Worried About Running Out of Food in the Last Year:      Ran Out of Food in the Last Year:    Transportation Needs:      Lack of Transportation (Medical):      Lack of Transportation (Non-Medical):    Physical Activity:      Days of Exercise per Week:      Minutes of Exercise per Session:    Stress:      Feeling of Stress :    Social Connections:      Frequency of Communication with Friends and Family:      Frequency of Social Gatherings with Friends and Family:      Attends Mormonism Services:      Active Member of Clubs or Organizations:      Attends Club or Organization Meetings:      Marital Status:    Intimate Partner Violence:      Fear of Current or Ex-Partner:      Emotionally Abused:      Physically Abused:      Sexually Abused:        Family History -   Family History   Problem Relation Age of Onset     Allergies Father         iodine     Heart Disease Father         heart attack at 59 - CABG at 60     Cerebrovascular Disease Father      Cancer Mother         colon -  at age 56     Cancer Sister         cervix -  at age 38     Neurologic Disorder Sister         CNS bleed x 2 sisters     Prostate Cancer Brother 58     Other Cancer Brother 62        stage 4 liver cancer     Colon Cancer Sister 68       Review of Systems - As  per HPI and PMHx, otherwise review of system review of the head and neck negative. Otherwise 10+ review of system is negative    Physical Exam  There were no vitals taken for this visit.  BMI: There is no height or weight on file to calculate BMI.          A/P - Rbeel Garcia is a 56 year old male Patient presents with:  Follow Up: CT results    As the results of CT scan I discussed and symptomatology discussed patient is given an option of certainly pursuing this further by getting a referral to the [facility] versus trial of conservative option likely for musculoskeletal issues that would include local heat massage ibuprofen.  He chooses the latter for now.  He will keep us posted with regard to his symptomatology and changes.    Rebel should follow up as needed depending on his symptoms.      16 minutes were spent with the patient in virtual counseling consulting during telephone visit.      Jude Vega MD

## 2021-11-10 ENCOUNTER — VIRTUAL VISIT (OUTPATIENT)
Dept: OTOLARYNGOLOGY | Facility: OTHER | Age: 56
End: 2021-11-10
Payer: COMMERCIAL

## 2021-11-10 VITALS — BODY MASS INDEX: 28.8 KG/M2 | WEIGHT: 208 LBS

## 2021-11-10 DIAGNOSIS — R09.A2 GLOBUS SENSATION: Primary | ICD-10-CM

## 2021-11-10 PROCEDURE — 99213 OFFICE O/P EST LOW 20 MIN: CPT | Mod: 95 | Performed by: OTOLARYNGOLOGY

## 2021-11-10 NOTE — LETTER
11/10/2021         RE: Rebel Garcia  [id number]  [facility] [address]        Dear Colleague,    Thank you for referring your patient, Rebel Garcia, to the [facility]. Please see a copy of my visit note below.    Rebel is a 56 year old who is being evaluated via telephone visit.        Telephone visit start Time: 11:01        Vitals:  No vitals were obtained today due to virtual visit.    Telephone Visit     Type of service:  telephone visit     Telephone visit end Time: 11: 17    Distant Location (provider location):  [facility]     History of Present Illness - Rebel Garcia is a 56 year old male presenting in clinic today for a recheck on Patient presents with:  Follow Up: CT results  CT SOFT TISSUE NECK W CONTRAST 11/4/2021 12:43 PM     History:  Neck mass, initial workup; Neck fullness; Cervicalgia  ICD-10: Neck fullness; Cervicalgia      Comparison:  None available      Technique: Following intravenous administration of nonionic iodinated  contrast medium, thin section helical CT images were obtained from the  skull base down to the level of the aortic arch.  Axial, coronal and  sagittal reformations were performed with 2-3 mm slice thickness  reconstruction. Images were reviewed in soft tissue, lung and bone  windows.     Contrast: 100 ml isovue 370     Findings:   Evaluation of the mucosal space demonstrates no evident abnormality in  the nasopharynx, oropharynx, hypopharynx or the glottis. There are  increased since in the palatine tonsils bilaterally which are likely  from prior inflammation. The tongue base appears normal. The major  salivary glands appear unremarkable. The thyroid gland appears normal.     There is no evident cervical lymphadenopathy. The fascial spaces in  the neck are intact bilaterally. The major vascular structures in the  neck appear unremarkable.     Evaluation of the osseous structures demonstrate no worrisome  lytic or  sclerotic lesion. No overt spinal canal or neuroforaminal stenosis.  Mucous retention cyst in left maxillary sinus. The mastoid air cells  are clear.      The visualized lung apices are clear.                                                                      Impression:  Normal CT study of the neck with contrast.  No evident mass or  adenopathy within the neck.      TAL KATE MD            Present Symptoms include: Still some discomfort on swallowing on the right side with a foreign body sensation.  It has not significantly changed recently.    There is no height or weight on file to calculate BMI.        BP Readings from Last 1 Encounters:   11/01/21 124/70       Telephone visit           Past Medical History -   Past Medical History:   Diagnosis Date     Gastroesophageal reflux disease without esophagitis 12/06/2018     Hyperlipidemia LDL goal <130 12/06/2018     Migraine, unspecified, without mention of intractable migraine without mention of status migrainosus      Orthostatic syncope      Sleep apnea        Current Medications -   Current Outpatient Medications:      escitalopram (LEXAPRO) 20 MG tablet, TAKE 1 TABLET(20 MG) BY MOUTH DAILY, Disp: 90 tablet, Rfl: 1     tamsulosin (FLOMAX) 0.4 MG capsule, Take 1 capsule (0.4 mg) by mouth daily, Disp: 90 capsule, Rfl: 3  No current facility-administered medications for this visit.    Allergies -   Allergies   Allergen Reactions     No Known Allergies        Social History -   Social History     Socioeconomic History     Marital status:      Spouse name: Not on file     Number of children: 2     Years of education: Not on file     Highest education level: Not on file   Occupational History     Occupation: [employer]     Employer: [employer]   Tobacco Use     Smoking status: Former Smoker     Packs/day: 0.75     Years: 18.00     Pack years: 13.50     Types: Cigarettes     Quit date: 2/14/2006     Years since quitting: 15.7      Smokeless tobacco: Never Used   Vaping Use     Vaping Use: Never used   Substance and Sexual Activity     Alcohol use: Yes     Comment: occ     Drug use: No     Sexual activity: Yes     Partners: Female     Birth control/protection: Pill     Comment: S.O. 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 No   Social History Narrative     Not on file     Social Determinants of Health     Financial Resource Strain:      Difficulty of Paying Living Expenses:    Food Insecurity:      Worried About Running Out of Food in the Last Year:      Ran Out of Food in the Last Year:    Transportation Needs:      Lack of Transportation (Medical):      Lack of Transportation (Non-Medical):    Physical Activity:      Days of Exercise per Week:      Minutes of Exercise per Session:    Stress:      Feeling of Stress :    Social Connections:      Frequency of Communication with Friends and Family:      Frequency of Social Gatherings with Friends and Family:      Attends Yazdanism Services:      Active Member of Clubs or Organizations:      Attends Club or Organization Meetings:      Marital Status:    Intimate Partner Violence:      Fear of Current or Ex-Partner:      Emotionally Abused:      Physically Abused:      Sexually Abused:        Family History -   Family History   Problem Relation Age of Onset     Allergies Father         iodine     Heart Disease Father         heart attack at 59 - CABG at 60     Cerebrovascular Disease Father      Cancer Mother         colon -  at  age 56     Cancer Sister         cervix -  at age 38     Neurologic Disorder Sister         CNS bleed x 2 sisters     Prostate Cancer Brother 58     Other Cancer Brother 62        stage 4 liver cancer     Colon Cancer Sister 68       Review of Systems - As per HPI and PMHx, otherwise review of system review of the head and neck negative. Otherwise 10+ review of system is negative    Physical Exam  There were no vitals taken for this visit.  BMI: There is no height or weight on file to calculate BMI.          A/P - Rebel Garcia is a 56 year old male Patient presents with:  Follow Up: CT results    As the results of CT scan I discussed and symptomatology discussed patient is given an option of certainly pursuing this further by getting a referral to the [facility] versus trial of conservative option likely for musculoskeletal issues that would include local heat massage ibuprofen.  He chooses the latter for now.  He will keep us posted with regard to his symptomatology and changes.    Rebel should follow up as needed depending on his symptoms.      16 minutes were spent with the patient in virtual counseling consulting during telephone visit.      Jude Vega MD          Again, thank you for allowing me to participate in the care of your patient.        Sincerely,        Jude Vega MD, MD

## 2022-02-12 ENCOUNTER — HEALTH MAINTENANCE LETTER (OUTPATIENT)
Age: 57
End: 2022-02-12

## 2022-04-09 DIAGNOSIS — F41.9 ANXIETY: ICD-10-CM

## 2022-04-11 RX ORDER — ESCITALOPRAM OXALATE 20 MG/1
TABLET ORAL
Qty: 90 TABLET | Refills: 0 | Status: SHIPPED | OUTPATIENT
Start: 2022-04-11 | End: 2022-07-11

## 2022-06-24 ENCOUNTER — TELEPHONE (OUTPATIENT)
Dept: OTOLARYNGOLOGY | Facility: CLINIC | Age: 57
End: 2022-06-24

## 2022-06-24 NOTE — TELEPHONE ENCOUNTER
Called and moved patient in to a work in spot on 6/30.     Bethany MOJICA, Lead RN, BSN. . .  6/24/2022, 2:11 PM

## 2022-06-24 NOTE — TELEPHONE ENCOUNTER
Reason for Call:  Other call back    Detailed comments: called pt to RS 7/25 appt as Gary will be out and pt request that he then be worked in sooner. Please work in if possible, thanks     Phone Number Patient can be reached at: Home number on file [phone] (home)    Best Time: any    Can we leave a detailed message on this number? YES    Call taken on 6/24/2022 at 1:02 PM by Chanel Melton

## 2022-06-30 ENCOUNTER — OFFICE VISIT (OUTPATIENT)
Dept: OTOLARYNGOLOGY | Facility: CLINIC | Age: 57
End: 2022-06-30
Payer: COMMERCIAL

## 2022-06-30 VITALS
SYSTOLIC BLOOD PRESSURE: 130 MMHG | BODY MASS INDEX: 29.26 KG/M2 | DIASTOLIC BLOOD PRESSURE: 84 MMHG | HEIGHT: 71 IN | WEIGHT: 209 LBS | TEMPERATURE: 97.7 F

## 2022-06-30 DIAGNOSIS — R09.A2 GLOBUS SENSATION: ICD-10-CM

## 2022-06-30 DIAGNOSIS — R19.8 EPISODE OF GAGGING: Primary | ICD-10-CM

## 2022-06-30 PROCEDURE — 99213 OFFICE O/P EST LOW 20 MIN: CPT | Performed by: OTOLARYNGOLOGY

## 2022-06-30 NOTE — Clinical Note
6/30/2022         RE: Rebel Garcia  [id number]  [facility] [address]        Dear Colleague,    Thank you for referring your patient, Rebel Garcia, to the [facility]. Please see a copy of my visit note below.    History of Present Illness - Rebel Garcia is a 56 year old male presenting in clinic today for a recheck on No chief complaint on file.    Patient presents in follow-up after different therapies that were offered to him for nonspecific symptomatology of foreign body sensation to the right of the midline at the level of the thyroid cartilage.  CT scan of the soft tissue did not reveal any pathology.  He was tried on antireflux therapy Previously without much relief.  Still gags especially in the morning and now brings up yellowish and greenish sputum.  Denies any headaches denies any nasal congestion any other signs of sinusitis.  He does wear CPAP and gets very parched and dry mouth in spite of trying to use heated humidity.   Denies any voice related changes.      BP Readings from Last 1 Encounters:   11/01/21 124/70       BP noted to be well controlled today in office.     Rebel IS NOT a smoker/uses chewing tobacco.  Rebel already quit    Previously without much relief.  Still gags especially in the morning and now brings up yellowish and greenish sputum.  Denies any headaches denies any nasal congestion any other signs of sinusitis.  He does wear CPAP and gets very parched and dry mouth in spite of trying to use heated humidity.  Past Medical History -   Past Medical History:   Diagnosis Date     Gastroesophageal reflux disease without esophagitis 12/06/2018     Hyperlipidemia LDL goal <130 12/06/2018     Migraine, unspecified, without mention of intractable migraine without mention of status migrainosus      Orthostatic syncope      Sleep apnea        Current Medications -   Current Outpatient Medications:      escitalopram (LEXAPRO) 20 MG tablet, TAKE 1  TABLET(20 MG) BY MOUTH DAILY, Disp: 90 tablet, Rfl: 0     tamsulosin (FLOMAX) 0.4 MG capsule, Take 1 capsule (0.4 mg) by mouth daily, Disp: 90 capsule, Rfl: 3    Allergies -   Allergies   Allergen Reactions     No Known Allergies        Social History -   Social History     Socioeconomic History     Marital status:      Number of children: 2   Occupational History     Occupation: [employer]     Employer: "[employer]   Tobacco Use     Smoking status: Former Smoker     Packs/day: 0.75     Years: 18.00     Pack years: 13.50     Types: Cigarettes     Quit date: 2006     Years since quittin.3     Smokeless tobacco: Never Used   Vaping Use     Vaping Use: Never used   Substance and Sexual Activity     Alcohol use: Yes     Comment: occ     Drug use: No     Sexual activity: Yes     Partners: Female     Birth control/protection: Pill     Comment: S.O. on birth control pills   Other Topics Concern      Service No     Blood Transfusions No     Caffeine Concern Yes     Comment: 2 pots of coffee per day     Occupational Exposure Yes     Comment: [employer] Hazards No     Sleep Concern Yes     Comment: recently related to anxiety     Stress Concern No     Weight Concern No     Special Diet No     Back Care No     Exercise Yes     Bike Helmet Yes     Seat Belt Yes     Self-Exams No     Parent/sibling w/ CABG, MI or angioplasty before 65F 55M? No       Family History -   Family History   Problem Relation Age of Onset     Allergies Father         iodine     Heart Disease Father         heart attack at 59 - CABG at 60     Cerebrovascular Disease Father      Cancer Mother         colon -  at age 56     Cancer Sister         cervix -  at age 38     Neurologic Disorder Sister         CNS bleed x 2 sisters     Prostate Cancer Brother 58     Other Cancer Brother 62        stage 4 liver cancer     Colon Cancer Sister 68       Review of Systems - As per HPI and PMHx, otherwise review  of system review of the head and neck negative. Otherwise 10+ review of system is negative    Physical Exam  There were no vitals taken for this visit.  BMI: There is no height or weight on file to calculate BMI.    General - The patient is well nourished and well developed, and appears to have good nutritional status.  Alert and oriented to person and place, answers questions and cooperates with examination appropriately.    SKIN - No suspicious lesions or rashes.  Respiration - No respiratory distress.  Head and Face - Normocephalic and atraumatic, with no gross asymmetry noted of the contour of the facial features.  The facial nerve is intact, with strong symmetric movements.    Voice and Breathing - The patient was breathing comfortably without the use of accessory muscles. The patients voice was clear and strong, and had appropriate pitch and quality.    Ears - Bilateral pinna and EACs with normal appearing overlying skin. Tympanic membrane intact with good mobility on pneumatic otoscopy bilaterally. Bony landmarks of the ossicular chain are normal. The tympanic membranes are normal in appearance. No retraction, perforation, or masses.  No fluid or purulence was seen in the external canal or the middle ear.     Eyes - Extraocular movements intact.  Sclera were not icteric or injected, conjunctiva were pink and moist.    Mouth - Examination of the oral cavity showed pink, healthy oral mucosa. No lesions or ulcerations noted.  The tongue was mobile and midline, and the dentition were in good condition.      Throat - The walls of the oropharynx were smooth, pink, moist, symmetric, and had no lesions or ulcerations.  The tonsillar pillars and soft palate were symmetric. Tonsils are symmetric. The uvula was midline on elevation.    Neck - Normal midline excursion of the laryngotracheal complex during swallowing.  Full range of motion on passive movement.  Palpation of the occipital, submental, submandibular,  "internal jugular chain, and supraclavicular nodes did not demonstrate any abnormal lymph nodes or masses.  The carotid pulse was palpable bilaterally.  Palpation of the thyroid was soft and smooth, with no nodules or goiter appreciated.  The trachea was mobile and midline.    Nose - External contour is symmetric, no gross deflection or scars.  Nasal mucosa is pink and moist with no abnormal mucus.  The septum was midline and non-obstructive, turbinates of normal size and position.  No polyps, masses, or purulence noted on examination.    Neuro - Nonfocal neuro exam is normal, CN 2 through 12 intact, normal gait and muscle tone.      Performed in clinic today:  Attempts at mirror laryngoscopy were not possible due to gag reflex.  Therefore I proceeded with a fiberoptic examination.  First I sprayed both sides of the nose with a mixture of lidocaine and neosynephrine.  I then passed the scope through the nasal cavity.  The nasal cavity was unremarkable.  The nasopharynx was mucosally covered and symmetric.  The Eustachian tube openings were unobstructed.  Going further down I had a clear view of the base of tongue which had normal appearing lingual tonsillar tissue.  The base of tongue was free of lesions, and the vallecula was open.  The epiglottis was smooth and mucosally covered.  The supraglottic larynx was then clearly visualized.  The vocal cords moved smoothly and symmetrically, they were pearly white and no lesions were seen.  The pyriform sinuses were open, and the limited view of the postcricoid region did not show any lesions. {flexscope:247240}      A/P - Rebel Garcia is a 56 year old male No chief complaint on file.    ***    Reble should follow up {FOLLOW UP:[id number]}.      At Rebel next appointment they {WILL:[id number]::\"will\"} need a hearing test.      Jude Vega MD            Again, thank you for allowing me to participate in the care of your patient.        Sincerely,        Jude Vega MD, " MD

## 2022-06-30 NOTE — PROGRESS NOTES
History of Present Illness - Rebel Garcia is a 56 year old male presenting in clinic today for a recheck on No chief complaint on file.    Patient presents in follow-up after different therapies that were offered to him for nonspecific symptomatology of foreign body sensation to the right of the midline at the level of the thyroid cartilage.  CT scan of the soft tissue did not reveal any pathology.  He was tried on antireflux therapy Previously without much relief.  Still gags especially in the morning and now brings up yellowish and greenish sputum.  Denies any headaches denies any nasal congestion any other signs of sinusitis.  He does wear CPAP and gets very parched and dry mouth in spite of trying to use heated humidity.   Denies any voice related changes.      BP Readings from Last 1 Encounters:   11/01/21 124/70       BP noted to be well controlled today in office.     Rebel IS NOT a smoker/uses chewing tobacco.  Rebel already quit    Previously without much relief.  Still gags especially in the morning and now brings up yellowish and greenish sputum.  Denies any headaches denies any nasal congestion any other signs of sinusitis.  He does wear CPAP and gets very parched and dry mouth in spite of trying to use heated humidity.  Past Medical History -   Past Medical History:   Diagnosis Date     Gastroesophageal reflux disease without esophagitis 12/06/2018     Hyperlipidemia LDL goal <130 12/06/2018     Migraine, unspecified, without mention of intractable migraine without mention of status migrainosus      Orthostatic syncope      Sleep apnea        Current Medications -   Current Outpatient Medications:      escitalopram (LEXAPRO) 20 MG tablet, TAKE 1 TABLET(20 MG) BY MOUTH DAILY, Disp: 90 tablet, Rfl: 0     tamsulosin (FLOMAX) 0.4 MG capsule, Take 1 capsule (0.4 mg) by mouth daily, Disp: 90 capsule, Rfl: 3    Allergies -   Allergies   Allergen Reactions     No Known Allergies        Social History -    Social History     Socioeconomic History     Marital status:      Number of children: 2   Occupational History     Occupation: Yellowsmith     [place]er[place] [email or web address] [place]   Tobacco Use     Smoking status: Former Smoker     Packs/day: 0.75     Years: 18.00     Pack years: 13.50     Types: Cigarettes     Quit date: 2006     Years since quittin.3     Smokeless tobacco: Never Used   Vaping Use     Vaping Use: Never used   Substance and Sexual Activity     Alcohol use: Yes     Comment: occ     Drug use: No     Sexual activity: Yes     Partners: Female     Birth control/protection: Pill     Comment: S.O. on birth control pills   Other Topics Concern      Service No     Blood Transfusions No     Caffeine Concern Yes     Comment: 2 pots of coffee per day     Occupational Exposure Yes     Comment: NGRAIN Hazards No     Sleep Concern Yes     Comment: recently related to anxiety     Stress Concern No     Weight Concern No     Special Diet No     Back Care No     Exercise Yes     Bike Helmet Yes     Seat Belt Yes     Self-Exams No     Parent/sibling w/ CABG, MI or angioplasty before 65F 55M? No       Family History -   Family History   Problem Relation Age of Onset     Allergies Father         iodine     Heart Disease Father         heart attack at 59 - CABG at 60     Cerebrovascular Disease Father      Cancer Mother         colon -  at age 56     Cancer Sister         cervix -  at age 38     Neurologic Disorder Sister         CNS bleed x 2 sisters     Prostate Cancer Brother 58     Other Cancer Brother 62        stage 4 liver cancer     Colon Cancer Sister 68       Review of Systems - As per HPI and PMHx, otherwise review of system review of the head and neck negative. Otherwise 10+ review of system is negative    Physical Exam  There were no vitals taken for this visit.  BMI: There is no height or weight on file to calculate BMI.    General - The patient is well nourished  and well developed, and appears to have good nutritional status.  Alert and oriented to person and place, answers questions and cooperates with examination appropriately.    SKIN - No suspicious lesions or rashes.  Respiration - No respiratory distress.  Head and Face - Normocephalic and atraumatic, with no gross asymmetry noted of the contour of the facial features.  The facial nerve is intact, with strong symmetric movements.    Voice and Breathing - The patient was breathing comfortably without the use of accessory muscles. The patients voice was clear and strong, and had appropriate pitch and quality.    Ears - Bilateral pinna and EACs with normal appearing overlying skin. Tympanic membrane intact with good mobility on pneumatic otoscopy bilaterally. Bony landmarks of the ossicular chain are normal. The tympanic membranes are normal in appearance. No retraction, perforation, or masses.  No fluid or purulence was seen in the external canal or the middle ear.     Eyes - Extraocular movements intact.  Sclera were not icteric or injected, conjunctiva were pink and moist.    Mouth - Examination of the oral cavity showed pink, healthy oral mucosa. No lesions or ulcerations noted.  The tongue was mobile and midline, and the dentition were in good condition.      Throat - The walls of the oropharynx were smooth, pink, moist, symmetric, and had no lesions or ulcerations.  The tonsillar pillars and soft palate were symmetric. Tonsils are symmetric. The uvula was midline on elevation.    Neck - Normal midline excursion of the laryngotracheal complex during swallowing.  Full range of motion on passive movement.  Palpation of the occipital, submental, submandibular, internal jugular chain, and supraclavicular nodes did not demonstrate any abnormal lymph nodes or masses.  The carotid pulse was palpable bilaterally.  Palpation of the thyroid was soft and smooth, with no nodules or goiter appreciated.  The trachea was mobile  "and midline.    Nose - External contour is symmetric, no gross deflection or scars.  Nasal mucosa is pink and moist with no abnormal mucus.  The septum was midline and non-obstructive, turbinates of normal size and position.  No polyps, masses, or purulence noted on examination.    Neuro - Nonfocal neuro exam is normal, CN 2 through 12 intact, normal gait and muscle tone.      Performed in clinic today:  Attempts at mirror laryngoscopy were not possible due to gag reflex.  Therefore I proceeded with a fiberoptic examination.  First I sprayed both sides of the nose with a mixture of lidocaine and neosynephrine.  I then passed the scope through the nasal cavity.  The nasal cavity was unremarkable.  The nasopharynx was mucosally covered and symmetric.  The Eustachian tube openings were unobstructed.  Going further down I had a clear view of the base of tongue which had normal appearing lingual tonsillar tissue.  The base of tongue was free of lesions, and the vallecula was open.  The epiglottis was smooth and mucosally covered.  The supraglottic larynx was then clearly visualized.  The vocal cords moved smoothly and symmetrically, they were pearly white and no lesions were seen.  The pyriform sinuses were open, and the limited view of the postcricoid region did not show any lesions. {flexscope:[id number]}      A/P - Rebel Garcia is a 56 year old male No chief complaint on file.    ***    Rebel should follow up {FOLLOW UP:[id number]}.      At Rebel next appointment they {WILL:[id number]::\"will\"} need a hearing test.      Jude Vega MD        "

## 2022-07-06 NOTE — TELEPHONE ENCOUNTER
FUTURE VISIT INFORMATION      FUTURE VISIT INFORMATION:    Date: 10/20/22    Time: 9AM    [place] [facility] [place]ERR[place] INFORMATION:    Referring provider:  Jude Vega MD    Referring providers clinic:  [facility]     Reason for visit/diagnosis  Appt per patient, Referred by Gary, Larynx problems. Episode of gagging [R19.8], Globus sensation [R19.8] Med recs in [facility]. Location verified    RECORDS REQUESTED FROM:       Clinic name Comments Records Status Imaging Status   [facility]  6/30/22 note from Jude Vega MD [facility]    Imaging 11/4/21 CT NECK   9/1/21 XR Chest   1/7/21 MR Brain   9/29/2020 CT Chest [facility] PACS    [facility]      10/5/2020 EGD WITH BIOPSY Care everywhere    [facility]   2/3/2020 note from Tomas Aquino PA-C   Care everywhere

## 2022-07-07 NOTE — TELEPHONE ENCOUNTER
Left message for patient to contact office. ----- Message from Audra Govea MD sent at 7/6/2022  5:07 PM EDT -----  Please let him know I reviewed the echo. There is good news and not so good news. His EF has improved above 35% and he can discontinue the vest.    The mitral valve however still appears to be leaking to at least a moderate degree. Unfortunately when there is infection of the valve it can literally chew up the valve making a repair difficult. I think we should repeat his ANITRA to get a better look at the repair and to more accurately quantify how much the valve is leaking. Will send to covering provider to switch from tablets to capsules.     Quinton Ratliff,

## 2022-07-08 DIAGNOSIS — F41.9 ANXIETY: ICD-10-CM

## 2022-07-08 DIAGNOSIS — N40.1 BENIGN PROSTATIC HYPERPLASIA WITH INCOMPLETE BLADDER EMPTYING: ICD-10-CM

## 2022-07-08 DIAGNOSIS — R39.14 BENIGN PROSTATIC HYPERPLASIA WITH INCOMPLETE BLADDER EMPTYING: ICD-10-CM

## 2022-07-11 RX ORDER — TAMSULOSIN HYDROCHLORIDE 0.4 MG/1
CAPSULE ORAL
Qty: 90 CAPSULE | Refills: 0 | Status: SHIPPED | OUTPATIENT
Start: 2022-07-11 | End: 2022-08-01

## 2022-07-11 RX ORDER — ESCITALOPRAM OXALATE 20 MG/1
TABLET ORAL
Qty: 90 TABLET | Refills: 0 | Status: SHIPPED | OUTPATIENT
Start: 2022-07-11 | End: 2022-08-01

## 2022-07-11 NOTE — TELEPHONE ENCOUNTER
Nutrition Services: Poor PO & Wt Loss on Nutrition Admit Screen & Low BMI   71 year old female with admit dx of partial small bowel obstruction  Past Pertinent Med Hx: HTN, peripheral vascular disease, dyslipidemia, COPD, diverticulosis, posterior vitreous detachment of left eye, eosinophilic colitis, AAA status post repair, severe right iliac peripheral vascular disease, status post left common iliac stent placement and left to right femoral bypass    Pt is currently NPO x1 day. RD will monitor for diet advancement. Per chart review pt's wt on 3/28 - 43.1 kg via stand up scale. Only stated wt in chart, unsure of amount of wt loss, needs measured wt.     Ht: 154.9 cm   Wt 5/23: 41.731 kg - stated wt  BMI: 17.38  Pertinent Labs: Reviewed.   Pertinent Meds: pericolace, morphine (prn), zofran (prn)  Fluids: NS infusion @ 83 ml/hr  Skin: No skin breakdown noted in chart  GI: Last BM 5/23    PLAN/RECOMMEND -   1) Nutrition rep to see patient daily for meal and snack preferences, when PO diet starts  2) Advance to PO diet when medically feasible.   3) Obtain measured weight when medically feasible. Weekly weights to monitor fluid and nutrition status  4) Obtain supplement order per RD as needed.    RD following     ervin

## 2022-07-25 ASSESSMENT — ENCOUNTER SYMPTOMS
PALPITATIONS: 0
NAUSEA: 0
HEMATOCHEZIA: 0
NERVOUS/ANXIOUS: 0
HEMATURIA: 0
FREQUENCY: 1
ABDOMINAL PAIN: 0
CONSTIPATION: 0
COUGH: 0
JOINT SWELLING: 0
WEAKNESS: 0
DIARRHEA: 0
HEADACHES: 0
EYE PAIN: 0
DYSURIA: 0
MYALGIAS: 0
ARTHRALGIAS: 0
DIZZINESS: 0
FEVER: 0
SHORTNESS OF BREATH: 0
SORE THROAT: 0
HEARTBURN: 1
CHILLS: 0
PARESTHESIAS: 0

## 2022-08-01 ENCOUNTER — OFFICE VISIT (OUTPATIENT)
Dept: FAMILY MEDICINE | Facility: CLINIC | Age: 57
End: 2022-08-01
Payer: COMMERCIAL

## 2022-08-01 VITALS
HEART RATE: 79 BPM | WEIGHT: 207 LBS | DIASTOLIC BLOOD PRESSURE: 76 MMHG | BODY MASS INDEX: 28.98 KG/M2 | RESPIRATION RATE: 12 BRPM | OXYGEN SATURATION: 95 % | TEMPERATURE: 98.7 F | HEIGHT: 71 IN | SYSTOLIC BLOOD PRESSURE: 128 MMHG

## 2022-08-01 DIAGNOSIS — E78.5 HYPERLIPIDEMIA LDL GOAL <130: ICD-10-CM

## 2022-08-01 DIAGNOSIS — Z12.11 SCREEN FOR COLON CANCER: ICD-10-CM

## 2022-08-01 DIAGNOSIS — Z80.42 FAMILY HISTORY OF PROSTATE CANCER: ICD-10-CM

## 2022-08-01 DIAGNOSIS — F90.9 ATTENTION DEFICIT HYPERACTIVITY DISORDER (ADHD), UNSPECIFIED ADHD TYPE: ICD-10-CM

## 2022-08-01 DIAGNOSIS — Z13.1 SCREENING FOR DIABETES MELLITUS: ICD-10-CM

## 2022-08-01 DIAGNOSIS — Z13.0 SCREENING FOR DEFICIENCY ANEMIA: ICD-10-CM

## 2022-08-01 DIAGNOSIS — Z00.00 ENCOUNTER FOR PREVENTIVE HEALTH EXAMINATION: Primary | ICD-10-CM

## 2022-08-01 DIAGNOSIS — Z12.5 SCREENING FOR PROSTATE CANCER: ICD-10-CM

## 2022-08-01 DIAGNOSIS — F41.9 ANXIETY: ICD-10-CM

## 2022-08-01 DIAGNOSIS — N40.1 BENIGN PROSTATIC HYPERPLASIA WITH INCOMPLETE BLADDER EMPTYING: ICD-10-CM

## 2022-08-01 DIAGNOSIS — R39.14 BENIGN PROSTATIC HYPERPLASIA WITH INCOMPLETE BLADDER EMPTYING: ICD-10-CM

## 2022-08-01 DIAGNOSIS — Z86.0100 HISTORY OF COLONIC POLYPS: ICD-10-CM

## 2022-08-01 LAB
ALBUMIN SERPL-MCNC: 4.3 G/DL (ref 3.4–5)
ALP SERPL-CCNC: 45 U/L (ref 40–150)
ALT SERPL W P-5'-P-CCNC: 78 U/L (ref 0–70)
ANION GAP SERPL CALCULATED.3IONS-SCNC: 5 MMOL/L (ref 3–14)
AST SERPL W P-5'-P-CCNC: 65 U/L (ref 0–45)
BILIRUB SERPL-MCNC: 0.6 MG/DL (ref 0.2–1.3)
BUN SERPL-MCNC: 16 MG/DL (ref 7–30)
CALCIUM SERPL-MCNC: 9.6 MG/DL (ref 8.5–10.1)
CHLORIDE BLD-SCNC: 107 MMOL/L (ref 94–109)
CHOLEST SERPL-MCNC: 213 MG/DL
CO2 SERPL-SCNC: 29 MMOL/L (ref 20–32)
CREAT SERPL-MCNC: 1.01 MG/DL (ref 0.66–1.25)
ERYTHROCYTE [DISTWIDTH] IN BLOOD BY AUTOMATED COUNT: 12.2 % (ref 10–15)
FASTING STATUS PATIENT QL REPORTED: YES
GFR SERPL CREATININE-BSD FRML MDRD: 87 ML/MIN/1.73M2
GLUCOSE BLD-MCNC: 121 MG/DL (ref 70–99)
HCT VFR BLD AUTO: 47.4 % (ref 40–53)
HDLC SERPL-MCNC: 80 MG/DL
HGB BLD-MCNC: 16.1 G/DL (ref 13.3–17.7)
LDLC SERPL CALC-MCNC: 98 MG/DL
MCH RBC QN AUTO: 31 PG (ref 26.5–33)
MCHC RBC AUTO-ENTMCNC: 34 G/DL (ref 31.5–36.5)
MCV RBC AUTO: 91 FL (ref 78–100)
NONHDLC SERPL-MCNC: 133 MG/DL
PLATELET # BLD AUTO: 204 10E3/UL (ref 150–450)
POTASSIUM BLD-SCNC: 4.7 MMOL/L (ref 3.4–5.3)
PROT SERPL-MCNC: 7.7 G/DL (ref 6.8–8.8)
PSA SERPL-MCNC: 2.83 UG/L (ref 0–4)
RBC # BLD AUTO: 5.2 10E6/UL (ref 4.4–5.9)
SODIUM SERPL-SCNC: 141 MMOL/L (ref 133–144)
TRIGL SERPL-MCNC: 173 MG/DL
WBC # BLD AUTO: 4.9 10E3/UL (ref 4–11)

## 2022-08-01 PROCEDURE — 80061 LIPID PANEL: CPT | Performed by: PHYSICIAN ASSISTANT

## 2022-08-01 PROCEDURE — 80053 COMPREHEN METABOLIC PANEL: CPT | Performed by: PHYSICIAN ASSISTANT

## 2022-08-01 PROCEDURE — 36415 COLL VENOUS BLD VENIPUNCTURE: CPT | Performed by: PHYSICIAN ASSISTANT

## 2022-08-01 PROCEDURE — 85027 COMPLETE CBC AUTOMATED: CPT | Performed by: PHYSICIAN ASSISTANT

## 2022-08-01 PROCEDURE — 99396 PREV VISIT EST AGE 40-64: CPT | Performed by: PHYSICIAN ASSISTANT

## 2022-08-01 PROCEDURE — 99213 OFFICE O/P EST LOW 20 MIN: CPT | Mod: 25 | Performed by: PHYSICIAN ASSISTANT

## 2022-08-01 PROCEDURE — G0103 PSA SCREENING: HCPCS | Performed by: PHYSICIAN ASSISTANT

## 2022-08-01 RX ORDER — ESCITALOPRAM OXALATE 20 MG/1
20 TABLET ORAL DAILY
Qty: 90 TABLET | Refills: 1 | Status: SHIPPED | OUTPATIENT
Start: 2022-08-01 | End: 2023-04-11

## 2022-08-01 RX ORDER — TAMSULOSIN HYDROCHLORIDE 0.4 MG/1
CAPSULE ORAL
Qty: 90 CAPSULE | Refills: 3 | Status: SHIPPED | OUTPATIENT
Start: 2022-08-01 | End: 2023-10-18

## 2022-08-01 RX ORDER — DEXMETHYLPHENIDATE HYDROCHLORIDE 15 MG/1
15 CAPSULE, EXTENDED RELEASE ORAL DAILY
Qty: 30 CAPSULE | Refills: 0 | Status: SHIPPED | OUTPATIENT
Start: 2022-08-01 | End: 2022-09-19 | Stop reason: DRUGHIGH

## 2022-08-01 ASSESSMENT — ENCOUNTER SYMPTOMS
HEARTBURN: 1
HEADACHES: 0
DIZZINESS: 0
WEAKNESS: 0
ABDOMINAL PAIN: 0
FREQUENCY: 1
COUGH: 0
FEVER: 0
NERVOUS/ANXIOUS: 0
CONSTIPATION: 0
NAUSEA: 0
DIARRHEA: 0
HEMATURIA: 0
HEMATOCHEZIA: 0
PARESTHESIAS: 0
DYSURIA: 0
SORE THROAT: 0
PALPITATIONS: 0
MYALGIAS: 0
ARTHRALGIAS: 0
JOINT SWELLING: 0
CHILLS: 0
EYE PAIN: 0
SHORTNESS OF BREATH: 0

## 2022-08-01 ASSESSMENT — PAIN SCALES - GENERAL: PAINLEVEL: NO PAIN (0)

## 2022-08-01 NOTE — PROGRESS NOTES
SUBJECTIVE:   CC: Rebel Garcia is an 56 year old male who presents for preventative health visit.       Patient has been advised of split billing requirements and indicates understanding: Yes     Healthy Habits:     Getting at least 3 servings of Calcium per day:  Yes    Bi-annual eye exam:  NO    Dental care twice a year:  Yes    Sleep apnea or symptoms of sleep apnea:  Sleep apnea    Diet:  Regular (no restrictions)    Frequency of exercise:  2-3 days/week    Taking medications regularly:  Yes    Medication side effects:  None    PHQ-2 Total Score: 0    Additional concerns today:  No    Patient has been seeing ear nose and throat.  We will refer to the move M because they are not able to has a foreign body sensation and taking every morning.  He is going to be starting Flonase in hopes that will help.    He has had more stress recently, he has gone to [facility] and is no longer drinking.  He struggled after his wife left him but is doing much better.  He has been promoted to the  which is good but also very stressful as he has many employees to manage he would like to restart his Focalin for his ADHD.  His anxiety is a bit elevated we would consider starting bupropion back again but he does not want to start too many medications at once which I agree with.      Today's PHQ-2 Score:   PHQ-2 ( 1999 Pfizer) 7/25/2022   Q1: Little interest or pleasure in doing things 0   Q2: Feeling down, depressed or hopeless 0   PHQ-2 Score 0   PHQ-2 Total Score (12-17 Years)- Positive if 3 or more points; Administer PHQ-A if positive -   Q1: Little interest or pleasure in doing things Not at all   Q2: Feeling down, depressed or hopeless Not at all   PHQ-2 Score 0       Abuse: Current or Past(Physical, Sexual or Emotional)- No  Do you feel safe in your environment? Yes    Have you ever done Advance Care Planning? (For example, a Health Directive, POLST, or a discussion with a medical provider or your loved  ones about your wishes): Yes, patient states has an Advance Care Planning document and will bring a copy to the clinic.    Social History     Tobacco Use     Smoking status: Former Smoker     Packs/day: 0.75     Years: 18.00     Pack years: 13.50     Types: Cigarettes     Quit date: 2006     Years since quittin.4     Smokeless tobacco: Never Used   Substance Use Topics     Alcohol use: Yes     Comment: occ         Alcohol Use 2022   Prescreen: >3 drinks/day or >7 drinks/week? Yes   Prescreen: >3 drinks/day or >7 drinks/week? -   AUDIT SCORE  8       Last PSA:   PSA   Date Value Ref Range Status   2020 0.87 0 - 4 ug/L Final     Comment:     Assay Method:  Chemiluminescence using Siemens Vista analyzer       Reviewed orders with patient. Reviewed health maintenance and updated orders accordingly - Yes  Labs reviewed in EPIC  BP Readings from Last 3 Encounters:   22 128/76   22 130/84   21 124/70    Wt Readings from Last 3 Encounters:   22 93.9 kg (207 lb)   22 94.8 kg (209 lb)   11/10/21 94.3 kg (208 lb)                  Patient Active Problem List   Diagnosis     Pain in joint, lower leg     Encounter for other general counseling or advice on contraception     Encounter for sterilization     Anxiety     Family history of colon cancer     Family history of prostate cancer     Attention deficit hyperactivity disorder (ADHD)     Hyperlipidemia LDL goal <130     Gastroesophageal reflux disease without esophagitis     Gastric ulcer without hemorrhage or perforation, unspecified chronicity     Past Surgical History:   Procedure Laterality Date     COLONOSCOPY  2000     COLONOSCOPY  02/16/10     COLONOSCOPY N/A 2015    Procedure: COMBINED COLONOSCOPY, SINGLE OR MULTIPLE BIOPSY/POLYPECTOMY BY BIOPSY;  Surgeon: Dima Sosa MD;  Location:  GI      CORRECT BUNION,ALVAREZ/DARNELL/JILLIAN  2005    Modified Darnell bunionectomy, right foot.     HC  REPAIR UMBILICAL HARDY,5+Y/O, INCARCERATED OR STRANGULATED  6/15/2004     HERNIA REPAIR, INCISIONAL  2006    Incarcerated recurrent ventral hernia.     HERNIORRHAPHY UMBILICAL  3/29/2011    HERNIORRHAPHY UMBILICAL performed by ALOK WESLEY at [facility]       Social History     Tobacco Use     Smoking status: Former Smoker     Packs/day: 0.75     Years: 18.00     Pack years: 13.50     Types: Cigarettes     Quit date: 2006     Years since quittin.4     Smokeless tobacco: Never Used   Substance Use Topics     Alcohol use: Yes     Comment: occ     Family History   Problem Relation Age of Onset     Allergies Father         iodine     Heart Disease Father         heart attack at 59 - CABG at 60     Cerebrovascular Disease Father              Cancer Mother         colon -  at age 56     Cancer Sister         cervix -  at age 38     Neurologic Disorder Sister         CNS bleed x 2 sisters     Prostate Cancer Brother 58     Other Cancer Brother 62             Colon Cancer Sister 68                 Current Outpatient Medications   Medication Sig Dispense Refill     dexmethylphenidate (FOCALIN XR) 15 MG 24 hr capsule Take 1 capsule (15 mg) by mouth daily 30 capsule 0     escitalopram (LEXAPRO) 20 MG tablet Take 1 tablet (20 mg) by mouth daily 90 tablet 1     tamsulosin (FLOMAX) 0.4 MG capsule TAKE 1 CAPSULE(0.4 MG) BY MOUTH DAILY 90 capsule 3     Allergies   Allergen Reactions     No Known Allergies        Reviewed and updated as needed this visit by clinical staff   Tobacco  Allergies  Meds   Med Hx  Surg Hx  Fam Hx  Soc Hx          Reviewed and updated as needed this visit by Provider                       Review of Systems   Constitutional: Negative for chills and fever.   HENT: Negative for congestion, ear pain, hearing loss and sore throat.    Eyes: Negative for pain and visual disturbance.   Respiratory: Negative for cough and shortness of breath.   "  Cardiovascular: Negative for chest pain, palpitations and peripheral edema.   Gastrointestinal: Positive for heartburn. Negative for abdominal pain, constipation, diarrhea, hematochezia and nausea.   Genitourinary: Positive for frequency and urgency. Negative for dysuria, genital sores, hematuria, impotence and penile discharge.   Musculoskeletal: Negative for arthralgias, joint swelling and myalgias.   Skin: Negative for rash.   Neurological: Negative for dizziness, weakness, headaches and paresthesias.   Psychiatric/Behavioral: Negative for mood changes. The patient is not nervous/anxious.      Wakes up gagging every morning for 10 minutes is seeing Dr Mensah for this, he is sending him to the [facility] as well  Urinary frequency continues to do well with tamsulosin.  There is been a definite benefit to this medication.    OBJECTIVE:   /76   Pulse 79   Temp 98.7  F (37.1  C) (Temporal)   Resp 12   Ht 1.809 m (5' 11.22\")   Wt 93.9 kg (207 lb)   SpO2 95%   BMI 28.69 kg/m      Physical Exam  GENERAL: healthy, alert and no distress  EYES: Eyes grossly normal to inspection, PERRL and conjunctivae and sclerae normal  HENT: ear canals and TM's normal, nose and mouth without ulcers or lesions  NECK: no adenopathy, no asymmetry, masses, or scars and thyroid normal to palpation  RESP: lungs clear to auscultation - no rales, rhonchi or wheezes  CV: regular rate and rhythm, normal S1 S2, no S3 or S4, no murmur, click or rub, no peripheral edema and peripheral pulses strong  ABDOMEN: soft, nontender, no hepatosplenomegaly, no masses and bowel sounds normal  MS: no gross musculoskeletal defects noted, no edema  SKIN: no suspicious lesions or rashes  NEURO: Normal strength and tone, mentation intact and speech normal  PSYCH: mentation appears normal, affect normal/bright    Diagnostic Test Results:  Labs reviewed in Epic  Results for orders placed or performed in visit on 08/01/22 (from the past 24 hour(s))   CBC with " platelets   Result Value Ref Range    WBC Count 4.9 4.0 - 11.0 10e3/uL    RBC Count 5.20 4.40 - 5.90 10e6/uL    Hemoglobin 16.1 13.3 - 17.7 g/dL    Hematocrit 47.4 40.0 - 53.0 %    MCV 91 78 - 100 fL    MCH 31.0 26.5 - 33.0 pg    MCHC 34.0 31.5 - 36.5 g/dL    RDW 12.2 10.0 - 15.0 %    Platelet Count 204 150 - 450 10e3/uL   Lipid panel reflex to direct LDL Fasting   Result Value Ref Range    Cholesterol 213 (H) <200 mg/dL    Triglycerides 173 (H) <150 mg/dL    Direct Measure HDL 80 >=40 mg/dL    LDL Cholesterol Calculated 98 <=100 mg/dL    Non HDL Cholesterol 133 (H) <130 mg/dL    Patient Fasting > 8hrs? Yes     Narrative    Cholesterol  Desirable:  <200 mg/dL    Triglycerides  Normal:  Less than 150 mg/dL  Borderline High:  150-199 mg/dL  High:  200-499 mg/dL  Very High:  Greater than or equal to 500 mg/dL    Direct Measure HDL  Female:  Greater than or equal to 50 mg/dL   Male:  Greater than or equal to 40 mg/dL    LDL Cholesterol  Desirable:  <100mg/dL  Above Desirable:  100-129 mg/dL   Borderline High:  130-159 mg/dL   High:  160-189 mg/dL   Very High:  >= 190 mg/dL    Non HDL Cholesterol  Desirable:  130 mg/dL  Above Desirable:  130-159 mg/dL  Borderline High:  160-189 mg/dL  High:  190-219 mg/dL  Very High:  Greater than or equal to 220 mg/dL   Comprehensive metabolic panel   Result Value Ref Range    Sodium 141 133 - 144 mmol/L    Potassium 4.7 3.4 - 5.3 mmol/L    Chloride 107 94 - 109 mmol/L    Carbon Dioxide (CO2) 29 20 - 32 mmol/L    Anion Gap 5 3 - 14 mmol/L    Urea Nitrogen 16 7 - 30 mg/dL    Creatinine 1.01 0.66 - 1.25 mg/dL    Calcium 9.6 8.5 - 10.1 mg/dL    Glucose 121 (H) 70 - 99 mg/dL    Alkaline Phosphatase 45 40 - 150 U/L    AST 65 (H) 0 - 45 U/L    ALT 78 (H) 0 - 70 U/L    Protein Total 7.7 6.8 - 8.8 g/dL    Albumin 4.3 3.4 - 5.0 g/dL    Bilirubin Total 0.6 0.2 - 1.3 mg/dL    GFR Estimate 87 >60 mL/min/1.73m2   Prostate Specific Antigen Screen   Result Value Ref Range    Prostate Specific Antigen  "Screen 2.83 0.00 - 4.00 ug/L       ASSESSMENT/PLAN:   (Z00.00) Encounter for preventive health examination  (primary encounter diagnosis)  Comment: Recommend routine annual visits  Plan:     (Z12.11) Screen for colon cancer  Comment:   Plan: Colonscopy Screening  Referral            (F41.9) Anxiety  Comment: Consider adding bupropion at a recheck in 1 month  Plan: escitalopram (LEXAPRO) 20 MG tablet            (N40.1,  R39.14) Benign prostatic hyperplasia with incomplete bladder emptying  Comment: Stable  Plan: tamsulosin (FLOMAX) 0.4 MG capsule        Continue current meds    (F90.9) Attention deficit hyperactivity disorder (ADHD), unspecified ADHD type  Comment: We will restart,  Plan: dexmethylphenidate (FOCALIN XR) 15 MG 24 hr         capsule        Recheck face-to-face visit in 1 month to reassess vitals    (Z12.5) Screening for prostate cancer  Comment:   Plan: Prostate Specific Antigen Screen            (Z86.010) History of colonic polyps  Comment:   Plan: Due for colonoscopy    (E78.5) Hyperlipidemia LDL goal <130  Comment:   Plan: Lipid panel reflex to direct LDL Fasting,         Comprehensive metabolic panel            (Z13.1) Screening for diabetes mellitus  Comment:   Plan: Comprehensive metabolic panel            (Z80.42) Family history of prostate cancer  Comment:   Plan: Prostate Specific Antigen Screen            (Z13.0) Screening for deficiency anemia  Comment:   Plan: CBC with platelets              Patient has been advised of split billing requirements and indicates understanding: Yes    COUNSELING:   Reviewed preventive health counseling, as reflected in patient instructions    Estimated body mass index is 28.69 kg/m  as calculated from the following:    Height as of this encounter: 1.809 m (5' 11.22\").    Weight as of this encounter: 93.9 kg (207 lb).     Weight management plan: Discussed healthy diet and exercise guidelines    He reports that he quit smoking about 16 years ago. His " smoking use included cigarettes. He has a 13.50 pack-year smoking history. He has never used smokeless tobacco.      Counseling Resources:  ATP IV Guidelines  Pooled Cohorts Equation Calculator  FRAX Risk Assessment  ICSI Preventive Guidelines  Dietary Guidelines for Americans, 2010  USDA's MyPlate  ASA Prophylaxis  Lung CA Screening    KEAGAN Walker [facility] ELODIA

## 2022-08-09 ENCOUNTER — MYC MEDICAL ADVICE (OUTPATIENT)
Dept: FAMILY MEDICINE | Facility: CLINIC | Age: 57
End: 2022-08-09

## 2022-08-09 DIAGNOSIS — F90.9 ATTENTION DEFICIT HYPERACTIVITY DISORDER (ADHD), UNSPECIFIED ADHD TYPE: Primary | ICD-10-CM

## 2022-08-10 RX ORDER — DEXMETHYLPHENIDATE HYDROCHLORIDE 25 MG/1
25 CAPSULE, EXTENDED RELEASE ORAL DAILY
Qty: 30 CAPSULE | Refills: 0 | Status: SHIPPED | OUTPATIENT
Start: 2022-08-10 | End: 2022-09-08

## 2022-09-08 ENCOUNTER — MYC REFILL (OUTPATIENT)
Dept: FAMILY MEDICINE | Facility: CLINIC | Age: 57
End: 2022-09-08

## 2022-09-08 DIAGNOSIS — F90.9 ATTENTION DEFICIT HYPERACTIVITY DISORDER (ADHD), UNSPECIFIED ADHD TYPE: ICD-10-CM

## 2022-09-13 RX ORDER — DEXMETHYLPHENIDATE HYDROCHLORIDE 25 MG/1
25 CAPSULE, EXTENDED RELEASE ORAL DAILY
Qty: 30 CAPSULE | Refills: 0 | Status: SHIPPED | OUTPATIENT
Start: 2022-09-13 | End: 2022-09-19

## 2022-09-19 ENCOUNTER — OFFICE VISIT (OUTPATIENT)
Dept: FAMILY MEDICINE | Facility: CLINIC | Age: 57
End: 2022-09-19
Attending: PHYSICIAN ASSISTANT
Payer: COMMERCIAL

## 2022-09-19 VITALS
OXYGEN SATURATION: 96 % | HEIGHT: 71 IN | WEIGHT: 204 LBS | TEMPERATURE: 98.2 F | SYSTOLIC BLOOD PRESSURE: 122 MMHG | HEART RATE: 73 BPM | BODY MASS INDEX: 28.56 KG/M2 | RESPIRATION RATE: 12 BRPM | DIASTOLIC BLOOD PRESSURE: 76 MMHG

## 2022-09-19 DIAGNOSIS — F90.9 ATTENTION DEFICIT HYPERACTIVITY DISORDER (ADHD), UNSPECIFIED ADHD TYPE: ICD-10-CM

## 2022-09-19 DIAGNOSIS — Z12.11 SCREEN FOR COLON CANCER: Primary | ICD-10-CM

## 2022-09-19 PROCEDURE — 99213 OFFICE O/P EST LOW 20 MIN: CPT | Performed by: PHYSICIAN ASSISTANT

## 2022-09-19 RX ORDER — DEXMETHYLPHENIDATE HYDROCHLORIDE 25 MG/1
25 CAPSULE, EXTENDED RELEASE ORAL DAILY
Qty: 30 CAPSULE | Refills: 0 | Status: SHIPPED | OUTPATIENT
Start: 2022-11-13 | End: 2023-02-06

## 2022-09-19 RX ORDER — DEXMETHYLPHENIDATE HYDROCHLORIDE 25 MG/1
25 CAPSULE, EXTENDED RELEASE ORAL DAILY
Qty: 30 CAPSULE | Refills: 0 | Status: SHIPPED | OUTPATIENT
Start: 2022-10-13 | End: 2023-02-06

## 2022-09-19 RX ORDER — DEXMETHYLPHENIDATE HYDROCHLORIDE 25 MG/1
25 CAPSULE, EXTENDED RELEASE ORAL DAILY
Qty: 30 CAPSULE | Refills: 0 | Status: SHIPPED | OUTPATIENT
Start: 2022-12-13 | End: 2023-02-06

## 2022-09-19 ASSESSMENT — PAIN SCALES - GENERAL: PAINLEVEL: NO PAIN (0)

## 2022-09-19 NOTE — PROGRESS NOTES
Assessment & Plan     Attention deficit hyperactivity disorder (ADHD), unspecified ADHD type  Improved control, we will continue current meds at the current dosage recheck 3 months, would consider extending to 6 months if he continues to do well at that point, vitals are stable today  - dexmethylphenidate (FOCALIN XR) 25 MG 24 hr capsule; Take 1 capsule (25 mg) by mouth daily  - dexmethylphenidate (FOCALIN XR) 25 MG 24 hr capsule; Take 1 capsule (25 mg) by mouth daily  - dexmethylphenidate (FOCALIN XR) 25 MG 24 hr capsule; Take 1 capsule (25 mg) by mouth daily  For now, he will continue on Lexapro alone for his anxiety.  We could add bupropion as needed but he does not think it is necessary.  Screen for colon cancer  He will call to schedule he has a number at home         Return in about 3 months (around 12/19/2022), or if symptoms worsen or fail to improve adhd.    Marilu Gomez PA-C  [facility] [place]    Subjective     Rebel Dardenberg is a 57 year old male who presents to clinic today for the following health issues accompanied by his self:  SUBJECTIVE:  Patient is here today to recheck ADHD/ADD.    Updates since last visit: good, he noted improvement going from the 15 to 25 mg dosage.  If he takes his med later in the day such as 8 AM it lasts during his entire workday.  Initially he took it right when he woke up in the morning but that would cause it to wear off sooner than he would like it.    Routine for taking medicine, including time: 8a  Time medicine wears off: 2-3p  Issues at school/Work: none  Issues at home: none  Control of symptoms: doing better     Side effects: canker sores on tip of togue, he complains of no side effects to me  Headaches: No  Stomach aches: No  Irritability/mood swings: No  Difficulties with sleep: No  Social withdrawal: No  Unusual movements/tics: No  Decreased appetite: No    Other concerns: none, he continues to avoid alcohol and is attending AA.  He is doing  "well      History of Present Illness       Mental Health Follow-up:  Patient presents to follow-up on Anxiety.    Patient's anxiety since last visit has been:  No change            Hyperlipidemia:  He presents for follow up of hyperlipidemia.  He is not taking medication to lower cholesterol.     Reason for visit:  Med check    He eats 2-3 servings of fruits and vegetables daily.He consumes 2 sweetened beverage(s) daily.He exercises with enough effort to increase his heart rate 10 to 19 minutes per day.  He exercises with enough effort to increase his heart rate 7 days per week.   He is taking medications regularly.             Review of Systems   Constitutional, HEENT, cardiovascular, pulmonary, gi and gu systems are negative, except as otherwise noted.      Objective    /76   Pulse 73   Temp 98.2  F (36.8  C) (Temporal)   Resp 12   Ht 1.809 m (5' 11.22\")   Wt 92.5 kg (204 lb)   SpO2 96%   BMI 28.28 kg/m    Body mass index is 28.28 kg/m .  Physical Exam   GENERAL: healthy, alert and no distress  NECK: no adenopathy, no asymmetry, masses, or scars and thyroid normal to palpation  RESP: lungs clear to auscultation - no rales, rhonchi or wheezes  CV: regular rate and rhythm, normal S1 S2, no S3 or S4, no murmur, click or rub, no peripheral edema and peripheral pulses strong  MS: no gross musculoskeletal defects noted, no edema  PSYCH: mentation appears normal, affect normal/bright    Office Visit on 08/01/2022   Component Date Value Ref Range Status     WBC Count 08/01/2022 4.9  4.0 - 11.0 10e3/uL Final     RBC Count 08/01/2022 5.20  4.40 - 5.90 10e6/uL Final     Hemoglobin 08/01/2022 16.1  13.3 - 17.7 g/dL Final     Hematocrit 08/01/2022 47.4  40.0 - 53.0 % Final     MCV 08/01/2022 91  78 - 100 fL Final     MCH 08/01/2022 31.0  26.5 - 33.0 pg Final     MCHC 08/01/2022 34.0  31.5 - 36.5 g/dL Final     RDW 08/01/2022 12.2  10.0 - 15.0 % Final     Platelet Count 08/01/2022 204  150 - 450 10e3/uL Final     " Cholesterol 08/01/2022 213 (A) <200 mg/dL Final     Triglycerides 08/01/2022 173 (A) <150 mg/dL Final     Direct Measure HDL 08/01/2022 80  >=40 mg/dL Final     LDL Cholesterol Calculated 08/01/2022 98  <=100 mg/dL Final     Non HDL Cholesterol 08/01/2022 133 (A) <130 mg/dL Final     Patient Fasting > 8hrs? 08/01/2022 Yes   Final     Sodium 08/01/2022 141  133 - 144 mmol/L Final     Potassium 08/01/2022 4.7  3.4 - 5.3 mmol/L Final     Chloride 08/01/2022 107  94 - 109 mmol/L Final     Carbon Dioxide (CO2) 08/01/2022 29  20 - 32 mmol/L Final     Anion Gap 08/01/2022 5  3 - 14 mmol/L Final     Urea Nitrogen 08/01/2022 16  7 - 30 mg/dL Final     Creatinine 08/01/2022 1.01  0.66 - 1.25 mg/dL Final     Calcium 08/01/2022 9.6  8.5 - 10.1 mg/dL Final     Glucose 08/01/2022 121 (A) 70 - 99 mg/dL Final     Alkaline Phosphatase 08/01/2022 45  40 - 150 U/L Final     AST 08/01/2022 65 (A) 0 - 45 U/L Final     ALT 08/01/2022 78 (A) 0 - 70 U/L Final     Protein Total 08/01/2022 7.7  6.8 - 8.8 g/dL Final     Albumin 08/01/2022 4.3  3.4 - 5.0 g/dL Final     Bilirubin Total 08/01/2022 0.6  0.2 - 1.3 mg/dL Final     GFR Estimate 08/01/2022 87  >60 mL/min/1.73m2 Final    Effective December 21, 2021 eGFRcr in adults is calculated using the 2021 CKD-EPI creatinine equation which includes age and gender (Marivel et al., NEJM, DOI: 10.1056/YOWSuw6449661)     Prostate Specific Antigen Screen 08/01/2022 2.83  0.00 - 4.00 ug/L Final

## 2022-09-29 ENCOUNTER — E-VISIT (OUTPATIENT)
Dept: FAMILY MEDICINE | Facility: CLINIC | Age: 57
End: 2022-09-29
Payer: COMMERCIAL

## 2022-09-29 DIAGNOSIS — G43.909 MIGRAINE WITHOUT STATUS MIGRAINOSUS, NOT INTRACTABLE, UNSPECIFIED MIGRAINE TYPE: Primary | ICD-10-CM

## 2022-09-29 PROCEDURE — 99421 OL DIG E/M SVC 5-10 MIN: CPT | Performed by: PHYSICIAN ASSISTANT

## 2022-09-29 NOTE — LETTER
September 29, 2022      Rebel Garcia  [id number] [facility] [address]        To Whom It May Concern:    Rebel Garcia had a visit today.  Please excuse him from work yesterday and today.  He may return to work tomorrow, Sept. 30, 2022.      Sincerely,        Marilu Gomez PA-C

## 2022-10-06 ENCOUNTER — TELEPHONE (OUTPATIENT)
Dept: GASTROENTEROLOGY | Facility: CLINIC | Age: 57
End: 2022-10-06

## 2022-10-06 NOTE — TELEPHONE ENCOUNTER
Screening Questions    BlueKIND OF PREP RedLOCATION [review exclusion criteria] GreenSEDATION TYPE      NHave you had a positive covid test in the last 90 days?   If yes, what date?        Y Are you able to give consent for your medical care?  (Sedation review/consideration needed)      Y Are you active on mychart?       MEDICA What insurance is in the chart?        ASHLEY GOMEZ Ordering/Referring Provider:        28.4 BMI [BMI OVER 40-EXTENDED PREP]  BMI OVER 40 NEED PAC EVALUATION FOR UPU     Respiratory Screening:  [If yes to any of the following HOSPITAL setting only]     N      Do you use daily home oxygen?   Y     Do you have mod to severe Obstructive Sleep Apnea? Hospital only - Ok at [place]   N      Do you have Pulmonary Hypertension? NEED PAC APPT AT U     N      Do you have UNCONTROLLED asthma?         N Have you had a heart or lung transplant?          N Are you currently on dialysis? [If yes, G-PREP & HOSPITAL setting only]        N  Do you have chronic kidney disease? [If yes, G-PREP]       N  Do you have a diagnosis of diabetes?[If yes, G-PREP]       N Have you had a stroke or Transient ischemic attack (TIA - aka  mini stroke ) within 6 months? (If yes, please review exclusion criteria)         N  In the past 6 months, have you had any heart related issues including cardiomyopathy or heart attack?          N  If yes, did it require cardiac stenting or other implantable device?          N Do you have any implantable devices in your body (pacemaker, defib, LVAD)? (If yes, please review exclusion criteria)       N Do you take nitroglycerin?          N If yes, how often?  (If yes, HOSPITAL setting ONLY)       N Are you currently taking any blood thinners?           [IF YES, INFORM PATIENT TO FOLLOW UP W/ ORDERING PROVIDER FOR BRIDGING INSTRUCTIONS]        N  Do you take Phentermine?      Yes-> Hold for 7 days before procedure.  Please consult your prescribing provider if you have questions  about holding this medication.       N Are you taking any prescription pain medications on a routine schedule? [EXTENDED PREP AND MAC]            [FEMALES] are you currently pregnant?            If yes, how many weeks? [Greater than 12 weeks, OR NEEDED]       N Any chemical dependencies such as alcohol, street drugs, or methadone? [If yes, MAC]       N Any history of post-traumatic stress syndrome, severe anxiety or history of psychosis? [If yes, MAC]       Y Can you transfer from bed to chair? (If NO, please HOSPITAL setting  only)        N  On a regular basis do you go 3-5 days between bowel movements?[ If yes, EXTENDED PREP.]        Preferred LOCAL Pharmacy for Pre Prescription:             [id number] [place], [place] - [address] AT [facility] OF  [address]                              Scheduling Details       Caller:   (Please ask for phone number if not scheduled by patient)    Type of Procedure Scheduled: Lower Endoscopy [Colonoscopy]    GPREP Which Colonoscopy Prep was Sent:   ADRI  PATIENTS & GROEN'S PATIENTS NEEDS EXTENDED PREP    Date of Procedure: 12/20  Surgeon: MARCELINO  Location:     Sedation Type: MAC    Conscious Sedation- Needs  for 6 hours after the procedure  MAC/General-Needs  for 24 hours after procedure    N Pre-op Required at [facility] [place], [place] and OR for MAC sedation:        (Advise patient they will need a pre-op WITH IN 30 DAYS prior to procedure -)      Y Informed patient they will need an adult          Cannot take any type of public or medical transportation alone    Y Confirmed Nurse will call to complete assessment     HOME Pre-Procedure Covid test to be completed [[facility] PCR Testing Required]       Additional comments:

## 2022-10-09 ENCOUNTER — HEALTH MAINTENANCE LETTER (OUTPATIENT)
Age: 57
End: 2022-10-09

## 2022-10-20 ENCOUNTER — PRE VISIT (OUTPATIENT)
Dept: OTOLARYNGOLOGY | Facility: CLINIC | Age: 57
End: 2022-10-20

## 2022-12-06 ENCOUNTER — MYC MEDICAL ADVICE (OUTPATIENT)
Dept: FAMILY MEDICINE | Facility: CLINIC | Age: 57
End: 2022-12-06

## 2022-12-06 NOTE — TELEPHONE ENCOUNTER
SITUATION:   Migraines - Had a virtual visit on 09/29/22    can i get imatrex or something natural that will work for my migrains    BACKGROUND:   RN responded:        I see that you had a virtual visit back on 09/29/22. I am sorry to hear you are still experiencing migraines. Tell me more about them. When did it start? Where is it located? Any dizziness, vision changes, nausea or vomiting, or a stiff neck? What would you rate the pain 1-10, 10 being the worse? Any tinglinig or numbness or weakness in your arms or legs? Any fevers? What have you tried at home?      PATIENT REQUEST:   Imitrex    NURSE RECOMMENDATION:   In person visit.     PLAN:   Route to PCP.      ZINA Conteh, RN, PHN  [facility]  December 6, 2022

## 2022-12-06 NOTE — TELEPHONE ENCOUNTER
It would be best to see him in person to discuss his HEADACHE and medications to treat.  Our last visit was virtual and we can recheck his adhd at that time too  Marilu Gomez PA-C

## 2022-12-08 ENCOUNTER — TELEPHONE (OUTPATIENT)
Dept: FAMILY MEDICINE | Facility: OTHER | Age: 57
End: 2022-12-08

## 2022-12-08 NOTE — TELEPHONE ENCOUNTER
"Left message for pt to return call, when call is returned give information below and schedule appt.    \"I can see him on the 19th, okay to use same-day or virtual spots for him.  If he needs to be seen sooner, he can see one of my partners  Marilu Gomez PA-C\"      Gabby Najera CMA ([facility])      "

## 2022-12-08 NOTE — TELEPHONE ENCOUNTER
I can see him on the 19th, okay to use same-day or virtual spots for him.  If he needs to be seen sooner, he can see one of my partners  Marilu Gomez PA-C

## 2022-12-08 NOTE — TELEPHONE ENCOUNTER
Rebel is calling back and wanting to get scheduled with Marilu Gomez for an ED follow up.      Do you want me to double book him or can I use your approval spot on the 19th?  Or do you want him to be seen sooner or with another provider.    Latasha Gutierrez RN  [facility] ~ Registered Nurse  Clinic Triage ~ [place] [facility]  December 8, 2022

## 2022-12-09 NOTE — TELEPHONE ENCOUNTER
Patient was scheduled with Radha Kendall on 12/14/22    Closing encounter.      Latasha Gutierrez RN  [facility] ~ Registered Nurse  Clinic Triage ~ [place] [facility]  December 9, 2022

## 2022-12-09 NOTE — TELEPHONE ENCOUNTER
Please use my approval spot or either of my virtual's that day I will be in the building  Marilu Gomez PA-C

## 2022-12-14 ENCOUNTER — OFFICE VISIT (OUTPATIENT)
Dept: FAMILY MEDICINE | Facility: CLINIC | Age: 57
End: 2022-12-14
Payer: COMMERCIAL

## 2022-12-14 ENCOUNTER — TELEPHONE (OUTPATIENT)
Dept: GASTROENTEROLOGY | Facility: CLINIC | Age: 57
End: 2022-12-14

## 2022-12-14 VITALS
DIASTOLIC BLOOD PRESSURE: 80 MMHG | TEMPERATURE: 98.2 F | BODY MASS INDEX: 28.73 KG/M2 | OXYGEN SATURATION: 98 % | SYSTOLIC BLOOD PRESSURE: 120 MMHG | WEIGHT: 212.1 LBS | HEART RATE: 66 BPM | HEIGHT: 72 IN

## 2022-12-14 DIAGNOSIS — Z12.11 COLON CANCER SCREENING: ICD-10-CM

## 2022-12-14 DIAGNOSIS — F90.9 ATTENTION DEFICIT HYPERACTIVITY DISORDER (ADHD), UNSPECIFIED ADHD TYPE: ICD-10-CM

## 2022-12-14 DIAGNOSIS — Z09 HOSPITAL DISCHARGE FOLLOW-UP: Primary | ICD-10-CM

## 2022-12-14 DIAGNOSIS — I63.9 CEREBROVASCULAR ACCIDENT (CVA), UNSPECIFIED MECHANISM (H): ICD-10-CM

## 2022-12-14 DIAGNOSIS — G81.94 ACUTE LEFT HEMIPARESIS (H): ICD-10-CM

## 2022-12-14 PROCEDURE — 99214 OFFICE O/P EST MOD 30 MIN: CPT | Performed by: PHYSICIAN ASSISTANT

## 2022-12-14 RX ORDER — ATORVASTATIN CALCIUM 20 MG/1
TABLET, FILM COATED ORAL
COMMUNITY
Start: 2022-12-07 | End: 2022-12-28

## 2022-12-14 RX ORDER — DEXMETHYLPHENIDATE HYDROCHLORIDE 25 MG/1
25 CAPSULE, EXTENDED RELEASE ORAL DAILY
Qty: 30 CAPSULE | Refills: 0 | Status: CANCELLED | OUTPATIENT
Start: 2022-12-14

## 2022-12-14 RX ORDER — ASPIRIN 81 MG/1
TABLET, COATED ORAL
COMMUNITY
Start: 2022-12-07

## 2022-12-14 ASSESSMENT — PAIN SCALES - GENERAL: PAINLEVEL: NO PAIN (0)

## 2022-12-14 NOTE — PROGRESS NOTES
Assessment & Plan     Hospital discharge follow-up  Cerebrovascular accident (CVA), unspecified mechanism (H)  He is taking aspirin 81mg.   He has NOT been taking plavix or statin. STRONGLY advised to  rx and start today. Discussed elevated risk in 30d following stroke and DAPT. He states agreement and that he will start both medications.   Advised he cancel and reschedule colonoscopy in 2-3mo.   Ref for PT for residual hand/leg sx.   Discussed RF reduction. He will monitor blood pressures at home. Is not on antihypertensive agents.   Letter for work, light duty  recheck in 2 weeks  Follow up with neurology as scheduled in Jan 2023.     - Physical Therapy Referral; Future    Acute left hemiparesis (H)  As above   - Physical Therapy Referral; Future    Attention deficit hyperactivity disorder (ADHD), unspecified ADHD type  He is on focalin XR.   I am not comfortably continuing to prescribe with recent CVA event. Can discuss w/PCP may need referral to psych or to seek non stimulant based options.     Colon cancer screening  He is rescheduled into Feb 2023.      MED REC REQUIRED  Post Medication Reconciliation Status:       Follow Up: The patient was instructed to contact clinic for worsening symptoms, non-improvement in time frame as expected/discussed, and for questions regarding medications or treatment plan. For virtual visits, the patient was advised to be seen for in person evaluation if symptoms or condition are worsening or non-improvement as expected.       Return in about 2 weeks (around 12/28/2022) for Recheck, With PCP (Marilu or me) .    KEAGAN [facility] [place] Luque is a 57 year old accompanied by his self, presenting for the following health issues:  Hospital F/U      History of Present Illness       Reason for visit:  Stroke    He eats 0-1 servings of fruits and vegetables daily.He consumes 1 sweetened beverage(s) daily.He exercises with enough  effort to increase his heart rate 30 to 60 minutes per day.  He exercises with enough effort to increase his heart rate 7 days per week.   He is taking medications regularly.         Hospital Follow-up Visit:    Hospital/Nursing Home/IP Rehab Facility: [facility]  Date of Admission:12/6/2022  Date of Discharge: 12/8/2022  Reason(s) for Admission: Stroke    Was your hospitalization related to COVID-19? No   Problems taking medications regularly:  None  Medication changes since discharge: Plavicks, Asprin, and one other,holding till after colonoscopy   Problems adhering to non-medication therapy:  None    Summary of hospitalization:  CareEverywhere information obtained and reviewed  Diagnostic Tests/Treatments reviewed.  Follow up needed: neurology   Other Healthcare Providers Involved in Patient s Care:         neuro  Update since discharge: stable.       CVA with left hemiparesis  Had typical migraine for the 2 days (tends to have 2x per year - his normal pattern is w/visual aura, while at work his supervisor noticed left side of facial droop and then he noticed weakness in left arm and leg.     He drove across parking lot to [facility] and was checking in at [facility] and  responded immediately stroke protocol.  Got tPA. Spent 2 days in ICU at [facility].     Did PT/OT.  When sitting at rest hand wants to rest in closed position and foot tends to relax out.   Did drop a cup of coffee he was holding in left hand.     Started on baby aspirin 81mg daily  Plavix was prescribed but he has not started it because he has a colonoscopy coming up.  The neurologist told him to reschedule his scope and take the dual anti-platelet therapy for the month.   He was started on atorvastatin but he also is not taking due to scheduled scope.    His BP normally run 120/80 .   Rooming BP high, improved at recheck.     He needs clearance to return to work.   - meetings, does work on  "equipment quite a bit.   Neurologist gave letter about being in care.   No restrictions on driving from neurology- told he was clear.   Work M-F 6am-2pm.       Review of Systems   Constitutional, HEENT, cardiovascular, pulmonary, gi and gu systems are negative, except as otherwise noted.      Objective    /80   Pulse 66   Temp 98.2  F (36.8  C) (Temporal)   Ht 1.825 m (5' 11.85\")   Wt 96.2 kg (212 lb 1.6 oz)   SpO2 98%   BMI 28.89 kg/m    Body mass index is 28.89 kg/m .  Physical Exam   GENERAL: healthy, alert and no distress  EYES: Eyes grossly normal to inspection, PERRL and conjunctivae and sclerae normal  HENT: ear canals and TM's normal, nose and mouth without ulcers or lesions  NECK: no adenopathy, no asymmetry, masses, or scars and thyroid normal to palpation  RESP: lungs clear to auscultation - no rales, rhonchi or wheezes  CV: regular rate and rhythm, normal S1 S2, no S3 or S4, no murmur, click or rub, no peripheral edema and peripheral pulses strong  ABDOMEN: soft, nontender, no hepatosplenomegaly, no masses and bowel sounds normal  MS: no gross musculoskeletal defects noted, no edema  NEURO:  CN 2-12 intact, cerebellar exam normal finger nose. Left hand at rest adducts into palm.  on left 4/5 vs right 5/5.  Gait normal.   PSYCH: mentation appears normal, affect normal/bright  LYMPH: normal ant/post cervical, supraclavicular nodes                  "

## 2022-12-14 NOTE — PATIENT INSTRUCTIONS
Please go  and the plavix and atorvastatin and start right away today     Reschedule the colonoscopy after you are through that period    Ref to PT in [place]     Blood pressure cuff - monitor at home.     See neurology as scheduled in Jan 2023.

## 2022-12-14 NOTE — LETTER
[facility]  [address], [address]  [place] [address]  Phone: [phone]  Fax: [phone]    December 14, 2022        Rebel Garcia  [id number] [facility] [address]          To whom it may concern:    RE: Rebel Garcia    Patient was seen and treated today at our clinic.  Patient may return to work 12/19/2022 with the following:  Light duty-unable to lift more than 5-10 pounds.    These restrictions will be re-evaluated at follow up appointment.     Please contact me for questions or concerns.      Sincerely,        Radha Kendall PA-C

## 2022-12-14 NOTE — TELEPHONE ENCOUNTER
Caller: Rebel  Reason for Reschedule/Cancellation (please be detailed, any staff messages or encounters to note?): Patient had stroke last week, and primary MD wants patient to wait      Prior to reschedule please review:    Ordering Provider: Patricia    Sedation per order:Protocol    Does patient have any ASC Exclusions, please identify?: Yes-[facility] and stroke within 6 months      Notes on Cancelled Procedure:    Procedure:Lower Endoscopy [Colonoscopy]     Date: 12/20/22    Location:[facility]; [address] [place] [address]    Surgeon: Long        Rescheduled: Yes    Procedure: Lower Endoscopy [Colonoscopy]     Date: 2/14/23    Location:[facility]; [address] [place] [address]    Surgeon: Long    Sedation Level Scheduled  MAC,  Reason for Sedation Level Date availability    Prep/Instructions updated and sent: Yes

## 2022-12-20 ENCOUNTER — HOSPITAL ENCOUNTER (OUTPATIENT)
Dept: PHYSICAL THERAPY | Facility: CLINIC | Age: 57
Setting detail: THERAPIES SERIES
Discharge: HOME | End: 2022-12-20
Attending: PHYSICIAN ASSISTANT
Payer: COMMERCIAL

## 2022-12-20 DIAGNOSIS — I63.9 CEREBROVASCULAR ACCIDENT (CVA), UNSPECIFIED MECHANISM (H): ICD-10-CM

## 2022-12-20 DIAGNOSIS — G81.94 ACUTE LEFT HEMIPARESIS (H): ICD-10-CM

## 2022-12-20 PROCEDURE — 97161 PT EVAL LOW COMPLEX 20 MIN: CPT | Mod: GP | Performed by: PHYSICAL THERAPIST

## 2022-12-20 PROCEDURE — 97110 THERAPEUTIC EXERCISES: CPT | Mod: GP | Performed by: PHYSICAL THERAPIST

## 2022-12-20 NOTE — PROGRESS NOTES
12/20/22 1400   Quick Adds   Type of Visit Initial OP PT Evaluation   General Information   Start of Care Date 12/20/22   Referring Physician Radha Kendall PA-C   Orders Evaluate and Treat as Indicated   Order Date 12/14/22   Medical Diagnosis CVA acute L hemiparesis   Onset of illness/injury or Date of Surgery 12/06/22   Precautions/Limitations no known precautions/limitations   Surgical/Medical history reviewed Yes   Pertinent history of current problem (include personal factors and/or comorbidities that impact the POC) Pt sustained a CVA with L sided weakness noted on 12/6/22.  Pt reports he had L sided weakness that had him feeling like he was dragging his arm and leg behind him.  This has improved significantly since then and denies any lingering feelings of weakness. PMH: Hyperlipidemia, ADHD, Anxiety.   Pertinent Visual History  Prior lasic surgery for up close reading, some nearsitedness   Prior level of functional mobility Ambulation   Ambulation independent   Current Community Support Family/friend caregiver   Patient role/Employment history Employed  (.)   Living environment [facility]   Home/Community Accessibility Comments 1 flight of stairs.   Patient/Family Goals Statement HEP.   Fall Risk Screen   Fall screen completed by PT   Have you fallen 2 or more times in the past year? No   Have you fallen and had an injury in the past year? No   Abuse Screen (yes response referral indicated)   Feels Unsafe at Home or Work/School no   Feels Threatened by Someone no   Does Anyone Try to Keep You From Having Contact with Others or Doing Things Outside Your Home? no   Physical Signs of Abuse Present no   Patient needs abuse support services and resources No   Pain   Patient currently in pain No   Cognitive Status Examination   Orientation orientation to person, place and time   Level of Consciousness alert   Follows Commands and Answers Questions 100% of the time   Personal Safety  and Judgment intact   Memory intact   Integumentary   Integumentary No deficits were identified   Posture   Posture Normal   Range of Motion (ROM)   ROM Comment WNL   Strength   Strength Comments Pronation/supination or L elbow: 4/5.  L HS 4/5.  Globally 5/5 all other motions.  Hand dynamometer: R: 120#, L: 90#.   Bed Mobility   Bed Mobility Comments WNL   Transfer Skills   Transfer Comments WNL   Gait   Gait Comments Normal gait pattern   Balance   Balance Comments Pacheco/56.   Sensory Examination   Sensory Perception no deficits were identified   Coordination   Coordination no deficits were identified   Muscle Tone   Muscle Tone no deficits were identified   Planned Therapy Interventions   Planned Therapy Interventions neuromuscular re-education;strengthening;stretching   Clinical Impression   Criteria for Skilled Therapeutic Interventions Met yes, treatment indicated   PT Diagnosis Generalized weakness   Influenced by the following impairments Weakness.   Functional limitations due to impairments Turning a wrench.   Clinical Presentation Stable/Uncomplicated   Clinical Presentation Rationale Clinical judgement   Clinical Decision Making (Complexity) Low complexity   Therapy Frequency 1 time/week   Predicted Duration of Therapy Intervention (days/wks) 28 weeks   Risk & Benefits of therapy have been explained Yes   Patient, Family & other staff in agreement with plan of care Yes   Clinical Impression Comments Pt is a 57 y.o. male who presented to PT with slight symptoms of UE and LE strength following CVA on 22.  Pt will benefit from establishing an HEP to address weakness.   Education Assessment   Preferred Learning Style Demonstration   Barriers to Learning No barriers   Total Evaluation Time   PT Steven Low Complexity Minutes (95651) 15

## 2022-12-28 ENCOUNTER — OFFICE VISIT (OUTPATIENT)
Dept: FAMILY MEDICINE | Facility: CLINIC | Age: 57
End: 2022-12-28
Payer: COMMERCIAL

## 2022-12-28 VITALS
WEIGHT: 212 LBS | BODY MASS INDEX: 28.71 KG/M2 | OXYGEN SATURATION: 98 % | TEMPERATURE: 97.8 F | HEIGHT: 72 IN | SYSTOLIC BLOOD PRESSURE: 120 MMHG | DIASTOLIC BLOOD PRESSURE: 80 MMHG | HEART RATE: 82 BPM

## 2022-12-28 DIAGNOSIS — I63.9 CEREBROVASCULAR ACCIDENT (CVA), UNSPECIFIED MECHANISM (H): Primary | ICD-10-CM

## 2022-12-28 DIAGNOSIS — F90.9 ATTENTION DEFICIT HYPERACTIVITY DISORDER (ADHD), UNSPECIFIED ADHD TYPE: ICD-10-CM

## 2022-12-28 DIAGNOSIS — G81.94 ACUTE LEFT HEMIPARESIS (H): ICD-10-CM

## 2022-12-28 PROCEDURE — 99214 OFFICE O/P EST MOD 30 MIN: CPT | Performed by: PHYSICIAN ASSISTANT

## 2022-12-28 RX ORDER — CLOPIDOGREL BISULFATE 75 MG/1
75 TABLET ORAL
COMMUNITY
Start: 2022-12-07 | End: 2022-12-28

## 2022-12-28 RX ORDER — ATORVASTATIN CALCIUM 20 MG/1
20 TABLET, FILM COATED ORAL
COMMUNITY
Start: 2022-12-07 | End: 2023-02-21

## 2022-12-28 RX ORDER — CLOPIDOGREL BISULFATE 75 MG/1
75 TABLET ORAL DAILY
COMMUNITY
Start: 2022-12-07 | End: 2023-02-21

## 2022-12-28 ASSESSMENT — PAIN SCALES - GENERAL: PAINLEVEL: NO PAIN (0)

## 2022-12-28 NOTE — PROGRESS NOTES
Assessment & Plan     Cerebrovascular accident (CVA), unspecified mechanism (H)  Acute left hemiparesis (H)  He is taking his statin, plavix and aspirin. No side effects  He has PT follow up and expects to pass evaluation. Letter for work given today to return to regular duties once cleared by PT.   He is scheduled to see neurology 01/09/23.     Attention deficit hyperactivity disorder (ADHD), unspecified ADHD type  He was on focalin XR prior to his CVA (not now).  His rooming blood pressures trending >140/90 , do come down on manual recheck.   I have told Rebel I am not comfortably continuing to prescribe with recent CVA event. He has requested I route to his PCP to see if she would continue to prescribe. I advised he may need referral to psych or to seek non stimulant based options.        Follow Up: The patient was instructed to contact clinic for worsening symptoms, non-improvement in time frame as expected/discussed, and for questions regarding medications or treatment plan. For virtual visits, the patient was advised to be seen for in person evaluation if symptoms or condition are worsening or non-improvement as expected.       No follow-ups on file.    Radha Kendall PA-C  [facility] [place] is a 57 year old accompanied by his self, presenting for the following health issues:  Stroke (Follow up)      Stroke    History of Present Illness       Cerebrovascular Disease: He presents for follow up of CVA/TIA.       Patient history::  TIA    Residual symptoms::  None    Headache location::  Not applicable    Location of tingling::  Not applicable    Worsened or new symptoms since last visit::  No    Reason for visit:  Stroke    He eats 0-1 servings of fruits and vegetables daily.He consumes 1 sweetened beverage(s) daily.He exercises with enough effort to increase his heart rate 30 to 60 minutes per day.  He exercises with enough effort to increase his heart rate 7 days per  "week.   He is taking medications regularly.     CVA-   Was seen 2 weeks ago as post-hospital discharge from CVA with leftsided hemiparesis.  He is doing well. Improving. Left-sided weakness improving.  No new sx. No migraine. No recurrence of sx.     He did start his medications after last visit:   Aspirin and plavix. Tolerating well not side effects  Atorvastatin- notices he has rest less sensation in legs at times but that was prior to the medication also.     Work- still on light duty.   Seeing neurology 01/09/2022 also via video.     Not checking home BPs. Repeat here 120/80.  Nonsmoker.   Job is active, active around his home, yard, has chickens.     Left sided weakness sx-   Improving.   He saw PT.   He has been doing exercises daily.   Has pretty much full function in his hand  Foot is not dragging  On testing at PT he has strength goals - follow up is in Jan 2, 2023.     Feels tired. He has not been taking his focalin since his CVA. Prescribed by his PCP Marilu Gomez PA-C.     Review of Systems   Constitutional, HEENT, cardiovascular, pulmonary, gi and gu systems are negative, except as otherwise noted.      Objective    /80   Pulse 82   Temp 97.8  F (36.6  C) (Temporal)   Ht 1.825 m (5' 11.85\")   Wt 96.2 kg (212 lb)   SpO2 98%   BMI 28.87 kg/m    Body mass index is 28.87 kg/m .  Physical Exam   GENERAL: healthy, alert and no distress  EYES: Eyes grossly normal to inspection, PERRL and conjunctivae and sclerae normal  HENT: ear canals and TM's normal, nose and mouth without ulcers or lesions  NECK: no adenopathy, no asymmetry, masses, or scars and thyroid normal to palpation  RESP: lungs clear to auscultation - no rales, rhonchi or wheezes  CV: regular rate and rhythm, normal S1 S2, no S3 or S4, no murmur, click or rub, no peripheral edema and peripheral pulses strong  ABDOMEN: soft, nontender, no hepatosplenomegaly, no masses and bowel sounds normal  MS: no gross musculoskeletal defects noted, " no edema  NEURO: Normal strength and tone, mentation intact and speech normal  PSYCH: mentation appears normal, affect normal/bright  LYMPH: normal ant/post cervical, supraclavicular nodes

## 2022-12-28 NOTE — Clinical Note
Marilu Dawson STONEY Luque would like to be on his focalin for his ADHD He was on focalin XR prior to his CVA (not now). His rooming blood pressures trending >140/90 , do come down on manual recheck.  I have told Rebel I am not comfortably continuing to prescribe with recent CVA event. He has requested I route to his PCP to see if she would continue to prescribe. I advised he may need referral to psych or to seek non stimulant based options.  I told him you would message him upon your return. Radha Kendall PA-C

## 2022-12-28 NOTE — PATIENT INSTRUCTIONS
PT eval and clearance to return to work     See neurology as scheduled in January     Continue on your medications.   Reach out if muscle pain or cramping is worsening

## 2022-12-28 NOTE — LETTER
[facility] [place]  [address], [address]  [place] [address]  Phone: [phone]  Fax: [phone]    December 28, 2022        Rebel Garcia  [id number] [address]          To whom it may concern:    RE: Rebel Garcia    Patient was seen and treated today at our clinic. He is currently on light duty. He may return to regular duty without restrictions once cleared by physical therapy. Upcoming PT visit 01/02/2023.     Please contact me for questions or concerns.      Sincerely,        Radha Kendall PA-C

## 2023-01-05 ENCOUNTER — TELEPHONE (OUTPATIENT)
Dept: FAMILY MEDICINE | Facility: CLINIC | Age: 58
End: 2023-01-05

## 2023-01-05 DIAGNOSIS — F90.9 ATTENTION DEFICIT HYPERACTIVITY DISORDER (ADHD), UNSPECIFIED ADHD TYPE: Primary | ICD-10-CM

## 2023-01-05 NOTE — TELEPHONE ENCOUNTER
Please call pt- I hope he is doing well after his stroke.  I would be most comfortable with him seeing psychiatry at least initially to manage his adhd after his stroke.  Once they have found a safe, effective option for him I would be happy to continue the treatment plan.  Would he like me to enter a referral?   Marilu Gomez PA-C

## 2023-01-05 NOTE — TELEPHONE ENCOUNTER
Left detailed message for Rebel and read back verbatim what Marilu Gomez wrote.    Waiting for patient to call back to let us know if he would like a referral placed for psychiatry to help manage his ADHD after his stroke.    Please transfer to triage with call back.    Latasha Gutierrez RN  [facility] ~ Registered Nurse  Clinic Triage ~ [place] [facility] [place]  January 5, 2023

## 2023-01-06 ENCOUNTER — MYC MEDICAL ADVICE (OUTPATIENT)
Dept: FAMILY MEDICINE | Facility: OTHER | Age: 58
End: 2023-01-06

## 2023-01-09 NOTE — TELEPHONE ENCOUNTER
Please review mychart response for placing a psychiatry referral.       Catrachito Stone, ROSAN, RN, PHN  [place]  January 9, 2023

## 2023-01-09 NOTE — TELEPHONE ENCOUNTER
See telephone encounter 1/5/23 - has been routed to PCP.   Closing my chart encounter.     Olamide LEEN, RN  [facility]

## 2023-02-02 NOTE — PROGRESS NOTES
[facility] Service    Outpatient Physical Therapy Discharge Note  Patient: Rebel Garcia  : 1965    Beginning/End Dates of Reporting Period:  22 to 22    Referring Provider: Radha Kendall PA-C.    Therapy Diagnosis: Generalized weakness.     Client Self Report: See eval.    Objective Measurements:   See eval.     Outcome Measures (most recent score):  Pacheco/56    Goals:  Goal Identifier  HEP   Goal Description Pt will be independent with HEP in order to improve strength and HEP.   Target Date 22   Date Met  22   Progress (detail required for progress note):       Plan:  Discharge from therapy.    Discharge:    Reason for Discharge: Patient has met all goals.    Equipment Issued: None.    Discharge Plan: Patient to continue home program.

## 2023-02-06 ENCOUNTER — TELEPHONE (OUTPATIENT)
Dept: FAMILY MEDICINE | Facility: CLINIC | Age: 58
End: 2023-02-06
Payer: COMMERCIAL

## 2023-02-06 NOTE — TELEPHONE ENCOUNTER
Please call patient, I think he should push out the date of his colonoscopy.  He needs to be at least 3 months out from a stroke before he could consider going off of his blood thinning medication for this procedure.  Also, I would not want to make this determination on my own, does he have follow-up planned with the neurologist?  Marilu Gomez PA-C

## 2023-02-06 NOTE — TELEPHONE ENCOUNTER
----- Message from Hemalatha Kenyon RN sent at 2/6/2023  1:51 PM CST -----  Regarding: Lin Luque is scheduled for colonoscopy 2/14.  I sent him a message to verify it is safe for him to be off of his Plavix the recommended 7 days.      Thank you.

## 2023-02-07 NOTE — TELEPHONE ENCOUNTER
RN left a detailed message per chart regarding pushing his colonoscopy back and how it should be scheduled 3 months after his stroke. Reason for postponing is related to be 3 months post stroke before going off blood thinning medication for this procedure.   Also, does he have follow up with his neurologist?     Sent mychart.     ZINA Conteh, RN, PHN  [place]  February 7, 2023

## 2023-02-07 NOTE — TELEPHONE ENCOUNTER
As long as neurology is the one making the recommendations to hold the clopidogrel that is fine, my recommendation would be to wait at least 90 days.  I will see him as planned in Feb thanks  Marilu Gomez PA-C

## 2023-02-07 NOTE — TELEPHONE ENCOUNTER
Pt calls back. He states that he talked to neurologist. They said that it is ok for him to go off his plavix for the procedure and that it is ok for him to take his baby aspirin. He will be going through with the procedure.     He stated that the neurologist also indicated that he could go back on his ADHD medication. Notes are present through [facility]. Scheduled pt for follow up with PCP to discuss.     Appointments in Next Year    Feb 21, 2023  8:00 AM  (Arrive by 7:40 AM)  Provider Visit with Marilu Gomez PA-C  [facility] ([facility] ) [phone]        Christine Bangura, ROSAN, RN, PHN  Registered Nurse-Clinic Triage  [facility]  2/7/2023 at 8:25 AM

## 2023-02-07 NOTE — TELEPHONE ENCOUNTER
Permanent comment states that it is ok to leave a message. Left detailed message with provider comment. Pt to call back with any questions.     Christine Bangura, ROSAN, RN, PHN  Registered Nurse-Clinic Triage  [facility]  2/7/2023 at 11:22 AM

## 2023-02-13 NOTE — H&P
[facility] Anesthesia Pre-op History and Physical    Rebel Garcia MRN# [id number]   Age: 57 year old YOB: 1965      Date of Surgery: 2/14/2023 Location [facility]      Date of Exam 2/14/2023 Facility (Same day)       Home clinic: [facility]  Primary care provider: Marilu Gomez         Chief Complaint and/or Reason for Procedure:   No chief complaint on file.  Colonoscopy.  Family hx CRC.  July 2015 prior exam.         Active problem list:     Patient Active Problem List    Diagnosis Date Noted     Acute left hemiparesis (H) 12/14/2022     Priority: Medium     Gastric ulcer without hemorrhage or perforation, unspecified chronicity 10/12/2020     Priority: Medium     Hyperlipidemia LDL goal <130 12/06/2018     Priority: Medium     Gastroesophageal reflux disease without esophagitis 12/06/2018     Priority: Medium     Attention deficit hyperactivity disorder (ADHD) 05/01/2014     Priority: Medium     Problem list name updated by automated process. Provider to review       Family history of colon cancer 09/19/2011     Priority: Medium     Family history of prostate cancer 09/19/2011     Priority: Medium     Anxiety 07/09/2009     Priority: Medium     Encounter for sterilization 02/01/2008     Priority: Medium     Problem list name updated by automated process. Provider to review       Encounter for other general counseling or advice on contraception 11/19/2007     Priority: Medium     Diagnosis updated by automated process. Provider to review and confirm.       Pain in joint, lower leg 09/17/2001     Priority: Medium            Medications (include herbals and vitamins):   Any Plavix use in the last 7 days? Yes     No current facility-administered medications for this encounter.     Current Outpatient Medications   Medication Sig     ASPIRIN LOW DOSE 81 MG EC tablet      atorvastatin (LIPITOR) 20 MG tablet Take 20 mg by mouth     bisacodyl  (DULCOLAX) 5 MG EC tablet Take 2 tablets at 3 pm the day before your procedure. If your procedure is before 11 am, take 2 additional tablets at 11 pm. If your procedure is after 11 am, take 2 additional tablets at 6 am. For additional instructions refer to your colonoscopy prep instructions.     clopidogrel (PLAVIX) 75 MG tablet Take 75 mg by mouth daily     escitalopram (LEXAPRO) 20 MG tablet Take 1 tablet (20 mg) by mouth daily     polyethylene glycol (GOLYTELY) 236 g suspension The night before the exam at 6 pm drink an 8-ounce glass every 15 minutes until the jug is half empty. If you arrive before 11 AM: Drink the other half of the Golytely jug at 11 PM night before procedure. If you arrive after 11 AM: Drink the other half of the Golytely jug at 6 AM day of procedure. For additional instructions refer to your colonoscopy prep instructions.     tamsulosin (FLOMAX) 0.4 MG capsule TAKE 1 CAPSULE(0.4 MG) BY MOUTH DAILY             Allergies:      Allergies   Allergen Reactions     No Known Allergies      Allergy to Latex? No  Allergy to tape?   No  Intolerances:             Physical Exam:   All vitals have been reviewed  No data found.  No intake/output data recorded.  Lungs:   No increased work of breathing, good air exchange, clear to auscultation bilaterally, no crackles or wheezing     Cardiovascular:   Normal apical impulse, regular rate and rhythm, normal S1 and S2, no S3 or S4, and no murmur noted             Lab / Radiology Results:            Anesthetic risk and/or ASA classification:       Delroy Myers MD

## 2023-02-14 ENCOUNTER — ANESTHESIA EVENT (OUTPATIENT)
Dept: GASTROENTEROLOGY | Facility: CLINIC | Age: 58
End: 2023-02-14
Payer: COMMERCIAL

## 2023-02-14 ENCOUNTER — HOSPITAL ENCOUNTER (OUTPATIENT)
Facility: CLINIC | Age: 58
Discharge: HOME | End: 2023-02-14
Attending: INTERNAL MEDICINE | Admitting: INTERNAL MEDICINE
Payer: COMMERCIAL

## 2023-02-14 ENCOUNTER — ANESTHESIA (OUTPATIENT)
Dept: GASTROENTEROLOGY | Facility: CLINIC | Age: 58
End: 2023-02-14
Payer: COMMERCIAL

## 2023-02-14 VITALS
TEMPERATURE: 97.8 F | SYSTOLIC BLOOD PRESSURE: 138 MMHG | OXYGEN SATURATION: 98 % | RESPIRATION RATE: 16 BRPM | DIASTOLIC BLOOD PRESSURE: 98 MMHG

## 2023-02-14 LAB — COLONOSCOPY: NORMAL

## 2023-02-14 PROCEDURE — 258N000003 HC RX IP 258 OP 636: Performed by: NURSE ANESTHETIST, CERTIFIED REGISTERED

## 2023-02-14 PROCEDURE — 45380 COLONOSCOPY AND BIOPSY: CPT | Mod: PT | Performed by: INTERNAL MEDICINE

## 2023-02-14 PROCEDURE — 370N000017 HC ANESTHESIA TECHNICAL FEE, PER MIN: Performed by: INTERNAL MEDICINE

## 2023-02-14 PROCEDURE — 250N000009 HC RX 250: Performed by: NURSE ANESTHETIST, CERTIFIED REGISTERED

## 2023-02-14 PROCEDURE — 88305 TISSUE EXAM BY PATHOLOGIST: CPT | Mod: TC | Performed by: INTERNAL MEDICINE

## 2023-02-14 PROCEDURE — 88305 TISSUE EXAM BY PATHOLOGIST: CPT | Mod: 26 | Performed by: PATHOLOGY

## 2023-02-14 PROCEDURE — 250N000011 HC RX IP 250 OP 636: Performed by: NURSE ANESTHETIST, CERTIFIED REGISTERED

## 2023-02-14 RX ORDER — PROPOFOL 10 MG/ML
INJECTION, EMULSION INTRAVENOUS CONTINUOUS PRN
Status: DISCONTINUED | OUTPATIENT
Start: 2023-02-14 | End: 2023-02-14

## 2023-02-14 RX ORDER — LIDOCAINE HYDROCHLORIDE 20 MG/ML
INJECTION, SOLUTION INFILTRATION; PERINEURAL PRN
Status: DISCONTINUED | OUTPATIENT
Start: 2023-02-14 | End: 2023-02-14

## 2023-02-14 RX ORDER — LIDOCAINE 40 MG/G
CREAM TOPICAL
Status: DISCONTINUED | OUTPATIENT
Start: 2023-02-14 | End: 2023-02-14 | Stop reason: HOSPADM

## 2023-02-14 RX ORDER — PROPOFOL 10 MG/ML
INJECTION, EMULSION INTRAVENOUS PRN
Status: DISCONTINUED | OUTPATIENT
Start: 2023-02-14 | End: 2023-02-14

## 2023-02-14 RX ORDER — SODIUM CHLORIDE, SODIUM LACTATE, POTASSIUM CHLORIDE, CALCIUM CHLORIDE 600; 310; 30; 20 MG/100ML; MG/100ML; MG/100ML; MG/100ML
INJECTION, SOLUTION INTRAVENOUS CONTINUOUS
Status: DISCONTINUED | OUTPATIENT
Start: 2023-02-14 | End: 2023-02-14 | Stop reason: HOSPADM

## 2023-02-14 RX ADMIN — PROPOFOL 40 MG: 10 INJECTION, EMULSION INTRAVENOUS at 09:58

## 2023-02-14 RX ADMIN — PROPOFOL 150 MCG/KG/MIN: 10 INJECTION, EMULSION INTRAVENOUS at 09:52

## 2023-02-14 RX ADMIN — PROPOFOL 50 MG: 10 INJECTION, EMULSION INTRAVENOUS at 09:52

## 2023-02-14 RX ADMIN — SODIUM CHLORIDE, POTASSIUM CHLORIDE, SODIUM LACTATE AND CALCIUM CHLORIDE: 600; 310; 30; 20 INJECTION, SOLUTION INTRAVENOUS at 08:57

## 2023-02-14 RX ADMIN — LIDOCAINE HYDROCHLORIDE 50 MG: 20 INJECTION, SOLUTION INFILTRATION; PERINEURAL at 09:52

## 2023-02-14 RX ADMIN — LIDOCAINE HYDROCHLORIDE 1 ML: 10 INJECTION, SOLUTION EPIDURAL; INFILTRATION; INTRACAUDAL; PERINEURAL at 08:57

## 2023-02-14 ASSESSMENT — ACTIVITIES OF DAILY LIVING (ADL)
ADLS_ACUITY_SCORE: 35
ADLS_ACUITY_SCORE: 35

## 2023-02-14 NOTE — ANESTHESIA PREPROCEDURE EVALUATION
Anesthesia Pre-Procedure Evaluation    Patient: Rebel Garcia   MRN: [id number] : 1965        Procedure : Procedure(s):  COLONOSCOPY          Past Medical History:   Diagnosis Date     Gastroesophageal reflux disease without esophagitis 2018     Hyperlipidemia LDL goal <130 2018     Migraine, unspecified, without mention of intractable migraine without mention of status migrainosus      Orthostatic syncope      Sleep apnea       Past Surgical History:   Procedure Laterality Date     COLONOSCOPY  2000     COLONOSCOPY  02/16/10     COLONOSCOPY N/A 2015    Procedure: COMBINED COLONOSCOPY, SINGLE OR MULTIPLE BIOPSY/POLYPECTOMY BY BIOPSY;  Surgeon: Dima Sosa MD;  Location:  GI     HC CORRECT BUNION,KIM/DARNELL/WALSH  2005    Modified Darnell bunionectomy, right foot.     HERNIA REPAIR, INCISIONAL  2006    Incarcerated recurrent ventral hernia.     HERNIORRHAPHY UMBILICAL  3/29/2011    HERNIORRHAPHY UMBILICAL performed by ALOK WESLEY at  OR     [facility] REPAIR UMBILICAL HARDY,5+Y/O, INCARCERATED OR STRANGULATED  6/15/2004      Allergies   Allergen Reactions     No Known Allergies       Social History     Tobacco Use     Smoking status: Former     Packs/day: 0.75     Years: 18.00     Pack years: 13.50     Types: Cigarettes     Quit date: 2006     Years since quittin.0     Smokeless tobacco: Never   Substance Use Topics     Alcohol use: Yes     Comment: occ      Wt Readings from Last 1 Encounters:   22 96.2 kg (212 lb)        Anesthesia Evaluation   Pt has had prior anesthetic. Type: General and MAC.        ROS/MED HX  ENT/Pulmonary:     (+) sleep apnea, Mild Persistent, asthma     Neurologic:  - neg neurologic ROS     Cardiovascular:     (+) -----Previous cardiac testing   Echo: Date: 2021 Results:  Normal biventricular size and systolic function. Left ventricular ejection  fraction of around 55%. Left ventricular apex not well visualized  in absence  of contrast use. On certain views the apex appears somewhat thick and  trabeculated. Would recommend use of contrast to better visualize apex in the  future.  2. In normal-sized atria.  3. No significant valvular disease. Thick mitral valve leaflets.  No prior study available for comparison.  _____________________________________________________________________________  __        Left Ventricle  The left ventricle is normal in size. There is normal left ventricular wall  thickness. Left ventricular systolic function is normal. The visual ejection  fraction is estimated at 55-60%. Grade I or early diastolic dysfunction. No  regional wall motion abnormalities noted.     Right Ventricle  The right ventricle is normal in size and function  Stress Test: Date: Results:    ECG Reviewed: Date: Results:    Cath: Date: Results:      METS/Exercise Tolerance:     Hematologic:  - neg hematologic  ROS     Musculoskeletal:   (+) arthritis,     GI/Hepatic: Comment: H/O gastric ulcer     (+) GERD,     Renal/Genitourinary:  - neg Renal ROS     Endo:  - neg endo ROS     Psychiatric/Substance Use:     (+) psychiatric history anxiety     Infectious Disease:  - neg infectious disease ROS     Malignancy:  - neg malignancy ROS     Other:            Physical Exam    Airway  airway exam normal      Mallampati: II   TM distance: > 3 FB   Neck ROM: full   Mouth opening: > 3 cm    Respiratory Devices and Support         Dental           Cardiovascular   cardiovascular exam normal       Rhythm and rate: regular and normal     Pulmonary   pulmonary exam normal        breath sounds clear to auscultation           OUTSIDE LABS:  CBC:   Lab Results   Component Value Date    WBC 4.9 08/01/2022    WBC 5.8 09/01/2021    HGB 16.1 08/01/2022    HGB 15.4 09/01/2021    HCT 47.4 08/01/2022    HCT 44.8 09/01/2021     08/01/2022     09/01/2021     BMP:   Lab Results   Component Value Date     08/01/2022     09/01/2021     POTASSIUM 4.7 08/01/2022    POTASSIUM 4.0 09/01/2021    CHLORIDE 107 08/01/2022    CHLORIDE 106 09/01/2021    CO2 29 08/01/2022    CO2 28 09/01/2021    BUN 16 08/01/2022    BUN 20 09/01/2021    CR 1.01 08/01/2022    CR 1.00 09/01/2021     (H) 08/01/2022    GLC 93 09/01/2021     COAGS:   Lab Results   Component Value Date    PTT 25 09/29/2020    INR 1.01 09/29/2020     POC: No results found for: BGM, HCG, HCGS  HEPATIC:   Lab Results   Component Value Date    ALBUMIN 4.3 08/01/2022    PROTTOTAL 7.7 08/01/2022    ALT 78 (H) 08/01/2022    AST 65 (H) 08/01/2022    ALKPHOS 45 08/01/2022    BILITOTAL 0.6 08/01/2022     OTHER:   Lab Results   Component Value Date    LACT 0.9 09/29/2020    HAL 9.6 08/01/2022    MAG 2.2 07/23/2019    LIPASE 126 09/29/2020    TSH 1.08 01/06/2021    CRP <2.9 11/03/2020    SED 5 11/03/2020       Anesthesia Plan    ASA Status:  2   NPO Status:  NPO Appropriate    Anesthesia Type: MAC.     - Reason for MAC: straight local not clinically adequate   Induction: Intravenous, Propofol.   Maintenance: TIVA.        Consents    Anesthesia Plan(s) and associated risks, benefits, and realistic alternatives discussed. Questions answered and patient/representative(s) expressed understanding.    - Discussed:     - Discussed with:  Patient      - Extended Intubation/Ventilatory Support Discussed: No.      - Patient is DNR/DNI Status: No    Use of blood products discussed: No .     Postoperative Care    Pain management: Oral pain medications.   PONV prophylaxis: Background Propofol Infusion     Comments:    Other Comments: The risks and benefits of anesthesia, and the alternatives where applicable, have been discussed with the patient, and they wish to proceed.            NIEVES Martinez CRNA

## 2023-02-14 NOTE — LETTER
February 17, 2023      Rebel Garcia  [address]  [place] [address]        Dear ,    We are writing to inform you of your test results.    This polyp type is benign in every way.  A repeat exam in 5 yrs with the family history is suggested.    Resulted Orders   Surgical Pathology Exam   Result Value Ref Range    Case Report       Surgical Pathology Report                         Case: [id number]                                  Authorizing Provider:  Delroy Myers MD        Collected:           02/14/2023 10:08 AM          Ordering Location:     [facility]          Received:            02/14/2023 11:29 AM                                 [facility]                                                          Pathologist:           Jesus Rivera MD                                                           Specimen:    Large Intestine, Colon, Sigmoid                                                            Final Diagnosis       Colon, sigmoid: Polypectomy:  - Hyperplastic polyp        Clinical Information       Procedure:  COLONOSCOPY, WITH POLYPECTOMY AND BIOPSY  Pre-op Diagnosis: Screen for colon cancer [Z12.11]  Post-op Diagnosis: Z12.11 - Screen for colon cancer [ICD-10-CM]      Gross Description       A(1). Large Intestine, Colon, Sigmoid, :  The specimen is received in formalin, labeled with the patient's name, medical record number and other identifying information and designated  colon, sigmoid . It consists of a single tan soft tissue fragment measuring 0.5 cm. Entirely submitted in one cassette.   (Gaston West, Grossing Tech)      Microscopic Description       The microscopic findings substantiates the final diagnosis.        Performing Labs       The technical component of this testing was completed at [facility]      Case Images         If you have any questions or concerns, please call the clinic  at the number listed above.       Sincerely,      Delroy Myers MD

## 2023-02-14 NOTE — ANESTHESIA POSTPROCEDURE EVALUATION
Patient: Rebel Garcia    Procedure: Procedure(s):  COLONOSCOPY, WITH POLYPECTOMY AND BIOPSY       Anesthesia Type:  MAC    Note:  Disposition: Outpatient   Postop Pain Control: Uneventful            Sign Out: Well controlled pain   PONV: No   Neuro/Psych: Uneventful            Sign Out: Acceptable/Baseline neuro status   Airway/Respiratory: Uneventful            Sign Out: Acceptable/Baseline resp. status   CV/Hemodynamics: Uneventful            Sign Out: Acceptable CV status   Other NRE: NONE   DID A NON-ROUTINE EVENT OCCUR? No    Event details/Postop Comments:  Pt was happy with anesthesia care.  No complications.  I will follow up with the pt if needed.           Last vitals:  Vitals Value Taken Time   /95 02/14/23 1020   Temp     Pulse 72 02/14/23 1020   Resp     SpO2 94 % 02/14/23 1028   Vitals shown include unvalidated device data.    Electronically Signed By: NIEVES Martinez CRNA  February 14, 2023  10:30 AM

## 2023-02-14 NOTE — DISCHARGE INSTRUCTIONS
[facility]    Home Care Following Endoscopy          Activity:  You have just undergone an endoscopic procedure usually performed with conscious sedation.  Do not work or operate machinery (including a car) for at least 12 hours.    I encourage you to walk and attempt to pass this air as soon as possible.    Diet:  Return to the diet you were on before your procedure but eat lightly for the first 12-24 hours.  Drink plenty of water.  Resume any regular medications unless otherwise advised by your physician.  Please begin any new medication prescribed as a result of your procedure as directed by your physician.   If you had any biopsy or polyp removed please refrain from aspirin or aspirin products for 2 days.  If on Coumadin please restart as instructed by your physician.   Pain:  You may take Tylenol as needed for pain.  Expected Recovery:  You can expect some mild abdominal fullness and/or discomfort due to the air used to inflate your intestinal tract.     Call Your Physician if You Have:    After Colonoscopy:  Worsening persisting abdominal pain which is worse with activity.  Fevers (>101 degrees F), chills or shakes.  Passage of continued blood with bowel movements.     Any questions or concerns about your recovery, please call [phone] or after hours [phone] Nurse Advice Line.    Follow-up Care:  You did have a polyp removed.  The polyp will be sent to pathology.  You should receive letter in your My Chart from Dr Myers with your results within 1-2 weeks. If you do not participate in My Chart a physical letter will come in the mail in 2-3 weeks.  Please call if you have not received a notification of your results.                Call [phone].

## 2023-02-14 NOTE — ANESTHESIA CARE TRANSFER NOTE
Patient: Rebel Garcia    Procedure: Procedure(s):  COLONOSCOPY, WITH POLYPECTOMY AND BIOPSY       Diagnosis: Screen for colon cancer [Z12.11]  Diagnosis Additional Information: No value filed.    Anesthesia Type:   MAC     Note:    Oropharynx: oropharynx clear of all foreign objects and spontaneously breathing  Level of Consciousness: drowsy  Oxygen Supplementation: room air    Independent Airway: airway patency satisfactory and stable  Dentition: dentition unchanged  Vital Signs Stable: post-procedure vital signs reviewed and stable  Report to RN Given: handoff report given  Patient transferred to: Phase II    Handoff Report: Identifed the Patient, Identified the Reponsible Provider, Reviewed the pertinent medical history, Discussed the surgical course, Reviewed Intra-OP anesthesia mangement and issues during anesthesia, Set expectations for post-procedure period and Allowed opportunity for questions and acknowledgement of understanding      Vitals:  Vitals Value Taken Time   /85 02/14/23 1015   Temp     Pulse 71 02/14/23 1015   Resp     SpO2 96 % 02/14/23 1018   Vitals shown include unvalidated device data.    Electronically Signed By: NIEVES Martinez CRNA  February 14, 2023  10:19 AM

## 2023-02-16 LAB
PATH REPORT.COMMENTS IMP SPEC: NORMAL
PATH REPORT.COMMENTS IMP SPEC: NORMAL
PATH REPORT.FINAL DX SPEC: NORMAL
PATH REPORT.GROSS SPEC: NORMAL
PATH REPORT.MICROSCOPIC SPEC OTHER STN: NORMAL
PATH REPORT.RELEVANT HX SPEC: NORMAL
PHOTO IMAGE: NORMAL

## 2023-02-21 ENCOUNTER — OFFICE VISIT (OUTPATIENT)
Dept: FAMILY MEDICINE | Facility: CLINIC | Age: 58
End: 2023-02-21
Payer: COMMERCIAL

## 2023-02-21 VITALS
HEART RATE: 84 BPM | OXYGEN SATURATION: 99 % | HEIGHT: 72 IN | RESPIRATION RATE: 14 BRPM | BODY MASS INDEX: 28.85 KG/M2 | DIASTOLIC BLOOD PRESSURE: 80 MMHG | TEMPERATURE: 98.3 F | SYSTOLIC BLOOD PRESSURE: 124 MMHG | WEIGHT: 213 LBS

## 2023-02-21 DIAGNOSIS — I63.9 CEREBROVASCULAR ACCIDENT (CVA), UNSPECIFIED MECHANISM (H): Primary | ICD-10-CM

## 2023-02-21 DIAGNOSIS — E78.5 HYPERLIPIDEMIA LDL GOAL <130: ICD-10-CM

## 2023-02-21 DIAGNOSIS — F90.9 ATTENTION DEFICIT HYPERACTIVITY DISORDER (ADHD), UNSPECIFIED ADHD TYPE: ICD-10-CM

## 2023-02-21 PROBLEM — G81.94 ACUTE LEFT HEMIPARESIS (H): Status: RESOLVED | Noted: 2022-12-14 | Resolved: 2023-02-21

## 2023-02-21 PROCEDURE — 99214 OFFICE O/P EST MOD 30 MIN: CPT | Performed by: PHYSICIAN ASSISTANT

## 2023-02-21 RX ORDER — ROSUVASTATIN CALCIUM 10 MG/1
10 TABLET, COATED ORAL DAILY
Qty: 90 TABLET | Refills: 1 | Status: SHIPPED | OUTPATIENT
Start: 2023-02-21 | End: 2023-08-28

## 2023-02-21 ASSESSMENT — PAIN SCALES - GENERAL: PAINLEVEL: MILD PAIN (2)

## 2023-02-21 NOTE — PROGRESS NOTES
"  Assessment & Plan     Cerebrovascular accident (CVA), unspecified mechanism (H)  Patient's blood pressure is borderline initially but after recheck it came down to 124/80.  Had considered starting statin.  We will have a low threshold to start hypertensive reducing meds.  I have started rosuvastatin since he did not tolerate atorvastatin.  He will continue on his aspirin a day.  He will establish care with [facility] neurologist, I am not comfortable restarting his meds for ADHD without their approval per the recommendation of the neurologist at [facility].  Also needs follow-up on the incidental finding of the small ICA aneurysm.  I have referred him to our neurology department for this.  We will also rule a fib with an updated zio patch patient initially was not fond of this recommendation though I explained if he were to have paroxysmal A-fib he would need a different anticoagulant to prevent further stroke   - rosuvastatin (CRESTOR) 10 MG tablet; Take 1 tablet (10 mg) by mouth daily  - Lipid panel reflex to direct LDL Fasting; Future  - ALT; Future  - Adult Neurology  Referral; Future  - Adult Leadless EKG Monitor 8 to 14 Days; Future    Hyperlipidemia LDL goal <130  Starting rosuvastatin since he did not tolerate atorvastatin, we will likely have to increase to 20 mg but will start low as I want him to be successful in taking this med  - Lipid panel reflex to direct LDL Fasting; Future  - ALT; Future    Attention deficit hyperactivity disorder (ADHD), unspecified ADHD type  We will hold off on starting his meds until we have the approval of neurology, if they do approve us starting medication we will start with a much lower dose and very carefully monitor his blood pressure.       BMI:   Estimated body mass index is 29.01 kg/m  as calculated from the following:    Height as of this encounter: 1.825 m (5' 11.85\").    Weight as of this encounter: 96.6 kg (213 lb).   Weight management plan: Discussed " healthy diet and exercise guidelines        No follow-ups on file.    Marilu Gomez PA-C  [facility] ELODIA Luque is a 57 year old, presenting for the following health issues:  Follow Up      History of Present Illness       Reason for visit:  Follow up    He eats 4 or more servings of fruits and vegetables daily.He consumes 1 sweetened beverage(s) daily.He exercises with enough effort to increase his heart rate 30 to 60 minutes per day.  He exercises with enough effort to increase his heart rate 6 days per week.   He is taking medications regularly.     - follow up his stroke. documentation reviewed in epic today  -states things have been going okay. No lingering symptoms, is back to baseline entirely.  He did have a follow-up with [facility] who recommended atorvastatin, 81 mg of aspirin daily and a heart monitor to rule out A-fib.  He also had an incidental finding of a 1.5 mm right cavernous ICA aneurysm that requires follow-up.  He is feeling very well in total.  He has improved his diet and has avoided alcohol as recommended.  He discontinued his atorvastatin, he had terrible leg cramps and aches.  The leg cramping and aching went away when he went off the med.    -patient states that he fell last night on his driveway, slipping on some ice. Did hit his head- states that he saw a flash of light.  Thankfully, he did not have a scalp laceration and has not had any new or progressive neurologic symptoms.  States he has a minimal headache this morning.  No paresthesias, vomiting, nausea, vision changes, cognitive issues or weakness.    ADHD Follow Up:   -has been off his medications for a while, this was discontinued with his hospitalization for his stroke.  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  He finds it difficult to maintain focus.  He states he has been struggling more at work because of  "this.  -focusing is hard in meetings at work        Review of Systems   Constitutional, HEENT, cardiovascular, pulmonary, gi and gu systems are negative, except as otherwise noted.      Objective    /80 (BP Location: Left arm, Patient Position: Chair, Cuff Size: Adult Regular)   Pulse 84   Temp 98.3  F (36.8  C) (Temporal)   Resp 14   Ht 1.825 m (5' 11.85\")   Wt 96.6 kg (213 lb)   SpO2 99%   BMI 29.01 kg/m    Body mass index is 29.01 kg/m .  Physical Exam   GENERAL: healthy, alert and no distress  EYES: Eyes grossly normal to inspection, PERRL and conjunctivae and sclerae normal  HENT: No hemotympanum or schulte sign, he does not have any palpable swelling or visible swelling at the back of his head  NECK: no adenopathy, no asymmetry, masses, or scars and thyroid normal to palpation  RESP: lungs clear to auscultation - no rales, rhonchi or wheezes  CV: regular rate and rhythm, normal S1 S2, no S3 or S4, no murmur, click or rub, no peripheral edema and peripheral pulses strong  MS: no gross musculoskeletal defects noted, no edema  NEURO: Normal strength and tone, mentation intact and speech normal  NEURO: speech normal, cranial nerves 2-12 intact, gait normal and Romberg normal  PSYCH: mentation appears normal, affect normal/bright    Admission on 02/14/2023, Discharged on 02/14/2023   Component Date Value Ref Range Status     Case Report 02/14/2023    Final                    Value:Surgical Pathology Report                         Case: [id number]                                  Authorizing Provider:  Delroy Myers MD        Collected:           02/14/2023 10:08 AM          Ordering Location:     [facility]          Received:            02/14/2023 11:29 AM                                 [facility]                                                          Pathologist:           Jesus Rivera MD                                                           Specimen:    Large Intestine, " Colon, Sigmoid                                                             Final Diagnosis 02/14/2023    Final                    Value:This result contains rich text formatting which cannot be displayed here.     Clinical Information 02/14/2023    Final                    Value:This result contains rich text formatting which cannot be displayed here.     Gross Description 02/14/2023    Final                    Value:This result contains rich text formatting which cannot be displayed here.     Microscopic Description 02/14/2023    Final                    Value:This result contains rich text formatting which cannot be displayed here.     Performing Labs 02/14/2023    Final                    Value:This result contains rich text formatting which cannot be displayed here.     COLONOSCOPY 02/14/2023    Final                    Value:[address] [address]  _______________________________________________________________________________  Patient Name: Rebel Garcia           Procedure Date: 2/14/2023 9:52 AM  MRN: [id number]                       Account Number: [id number]  YOB: 1965              Admit Type: Outpatient  Age: 57                               Room: [place] [address]  Gender: Male                          Note Status: Finalized  Attending MD: SLIM BENSON ,        Total Sedation Time:   _______________________________________________________________________________     Procedure:             Colonoscopy  Indications:           Last colonoscopy: 2015, Family history of colon cancer                          in multiple first-degree relatives  Providers:             SLIM BENSON  Referring MD:          BRIANNA ARDON  Medicines:             Monitored Anesthesia Care  Complications:         No immediate complications.  __________________                          _____________________________________________________________  Procedure:              After obtaining informed consent, the colonoscope was                          passed under direct vision. Throughout the procedure,                          the patient's blood pressure, pulse, and oxygen                          saturations were monitored continuously. The                          Colonoscope was introduced through the anus and                          advanced to the cecum, identified by appendiceal                          orifice and ileocecal valve. The patient tolerated the                          procedure well. The quality of the bowel preparation                          was good.                                                                                   Findings:       A 3 mm polyp was found in the sigmoid colon. The polyp was sessile. The        polyp was removed with a cold biopsy forceps. Resection and retrieval        were complete.       The exam was otherwise                           without abnormality.       A single diverticulum was found in the sigmoid colon.                                                                                   Impression:            - One 3 mm polyp in the sigmoid colon, removed with a                          cold biopsy forceps. Resected and retrieved.                         - The examination was otherwise normal.                         Mother with CRC age 50s. Father with CRC age 80s.  Recommendation:        - Await pathology results.                         - Repeat colonoscopy in 5 years for surveillance.                                                                                     Dr. Slim Myers  ________________  SLIM MYERS,   2/14/2023 10:14:30 AM  I was physically present for the entire viewing portion of the exam.SLIM MYERS  Number of Addenda: 0    Note Initiated On: 2/14/2023 9:52 AM

## 2023-02-21 NOTE — TELEPHONE ENCOUNTER
RECORDS RECEIVED FROM: Internal   REASON FOR VISIT: Cerebrovascular accident (CVA), unspecified mechanism (H) [I63.9],   Date of Appt: 02/23/2023   NOTES (FOR ALL VISITS) STATUS DETAILS   OFFICE NOTE from referring provider Internal 02/21/2023 Marilu Gomez [facility]    OFFICE NOTE from other specialist Internal 01/11/2023 [facility]   12/20/2022 PT [facility]    DISCHARGE SUMMARY from hospital Care Everywhere 12/06/2022 [facility]   DISCHARGE REPORT from the ER N/A    OPERATIVE REPORT N/A    MEDICATION LIST N/A    IMAGING  (FOR ALL VISITS)     EMG N/A    EEG N/A    LUMBAR PUNCTURE N/A    YOLANDA SCAN N/A    ULTRASOUND (CAROTID BILAT) *VASCULAR* N/A    MRI (HEAD, NECK, SPINE) Received 12/06/2022 head neck MRA   CT (HEAD, NECK, SPINE) Received 12/06/2022 head stroke, CTA head neck      Images in PACS

## 2023-02-21 NOTE — LETTER
February 21, 2023      Rebel Garcia  [id number] [address] [facility] [address]        To Whom It May Concern:    Rebel Garcia  was seen on February 21, 2023 .  He may return to work after our appointment today.      Sincerely,        Marilu Gomez PA-C

## 2023-02-22 NOTE — PROGRESS NOTES
[facility]  Section of General Neurology  New Patient  Virtual Visit      Rebel Garcia MRN# [id number]   Age: 57 year old YOB: 1965     Requesting physician: Marilu Gagnon     Reason for Consultation: Post stroke cares           Assessment and Plan:   Assessment:  Rebel Garcia is a pleasant 57 year old man seen in post stroke cares.  To review he had what sounds to be most likely an MRI negative stroke with left sided weakness as most prominent symptom (TNKplase likely dissolved clot prior to DWI changes manifesting).  Work up to date has been unrevealing with zio patch yet to be obtained.  Discussed rationale for aspirin/statin as currently, why OK to stop plavix previously.  Overall he has recovered quite well though still has fatigue at times confounded by stopped ADD medicine post stroke.  Overall I am pleased with his recovery.  Discussed future stroke prevention and plan as below.  All questions answered.     Plan:  Magnesium oxide 400 mg Riboflavin (vitamin b2) 400 mg daily for migraine prevention  Aspirin: 162 mg or 325 mg daily but if bleeding issues/ go back to the 81 mg  Statin: To continue: Goal LDL <70  Zio patch: to look for atrial flutter/fibrillation, this could  potentially  Do see neuroendovascular as previously referred for serial monitoring of small incidental aneurysm  I agree with MRI negative stroke as the most likely explanation as above  Imitrex: theoretical risk post stroke discussed, prefer excedrin migraine as abortive options--other options safe post stroke: nurtec or ubrelvy.   To go to ER right away again as you did if stroke like symptoms manifest again  OK to slowly resume ADD meds if desired with close BP monitoring.               Cody Simon MD   of Neurology   [facility]      History of Presenting Symptoms:   Rebel Garcia is a 57 year old male who presents  today for evaluation of post stroke cares    He lives in [facility]  He works as maintenance  manager    He recalls the day of: face drooping on the left and left leg quite weak.   Migraine: had one for a few days prior to this, gets one once q 3 months or so.  Discussed day of presentation at length.    Discussed imitrex and stroke risk.    Discussed MRI negative stroke, that it is possible/probable that TNK-plase dissolved clot  Heart rhythm monitoring: is being coordinated by his PCP.      He feels very good.  He still is fatigued, he notes.    He feels this could be secondary to ADD meds, discussed would be OK to resume with close BP monitoring.    Has previously quit smoking    Migraines: discussed Magnesium oxide 400 mg Riboflavin (vitamin b2) 400 mg daily  He notes sunlight, dark chocolate are triggers.              Recent o/p stroke note reviewed from [facility]:  Brief HPI: Rebel Garcia is a 57 y.o. right handed male who was admitted to [facility] hospital from 12/6 to 12/7/2022 with MR negative presumed aborted stroke s/p IV Tenecteplase.  Mr. Garcia presented to [facility] on 12/6/2022 via EMS with two day history of migraine headache and new left facial droop that morning. BP was elevated to 180/112.   A stroke code was called.  NIHSS was 9 (1 best gaze, 1 visual fields, 2 facial palsy, 2 left arm, 1 left leg, 1 dysarthria, 1 neglect).  CTA of the head and neck revealed incidental 1.5 mm right cavernous ICA aneurysm but was otherwise unremarkable.  He received IV Tenecteplase.  He was transferred to [facility] for continuing care and admitted to the NeuroICU for close neurologic monitoring.  Neurologically he improved dramatically.  NIHSS was 2 (1 left arm, 1 left leg) on arrival to the NeuroICU.  Subsequent MRI without evidence of acute infarction.  KOSTA revealed normal LV size and function with EF 55-60%, borderline dilated aortic sinus, and negative bubble study. He suffered an MR negative presumed aborted  "stroke s/p IV Tenecteplase.  Neurologically he improved.  NIHSS was 0 on the day of discharge.   SLUMS 26/30.  12/7 Discharged home.  Secondary stroke prevention: started Aspirin, Plavix, and Atorvastatin 20 mg (), hgb A1c 5.5.  Dual antiplatelet therapy x 30 days followed by monotherapy with Aspirin was planned.     Comorbidities:  Dyslipidemia  Obstructive sleep apnea treated with CPAP  Migraines  Hx gastric ulcer  ADHD  BPH  Hx tobacco use (quit '06)     Social History: Lives in one story with roommate, employed (), drove prior to hospitalization.        1/3/2023: Case reviewed with Stroke Medical Director Dr. Lio Cotton. Symptoms thought to be due to aborted stroke vs stroke mimic. Will treat as aborted stroke and address modifiable stroke risk factors. Recommend 14 day zio patch to complete work up.       1/11/2023 Interval History: At the time of one month follow up via phone office visit, Rebel DUENAS Jose stated he was doing \"awesome\". \"I had been going to physical therapy and even he said this looks all good.\" He doesn't note any residual left sided weakness. He denies any facial droop. The headache resolved. He feels completely normal \"other than maybe memory issues, but that is part of getting old\". He describes fatigue since he stopped his Methylphenidate. He relayed that primary care discontinued his stimulant after the stroke and recommended that he follow up with psychiatry for ADHD management. Her indicated it will likely be many months until he can get an appointment. Discussed the discontinuing stimulants is not an absolute post stroke. If ADHD is disabling recommend follow up with neurology through [place] to discuss resuming Methylphenidate.     We discussed his modifiable stroke risk factors (dyslipidemia). He has no known history of hypertensin. BP was 120/80 at follow up with primary care on 12/28. Discussed that we suspect that his symptoms were caused by a " stroke aborted by IV Tenecteplase. We do recommend 14 day cardiac monitor to evaluate for paroxysmal atrial fibrillation. He recalls wearing a cardiac monitor a few years ago and that his insurance company didn't cover it. He prefer to check with insurance regarding coverage before I order it. He will contact us for order after he has spoken with his insurance company. Discussed the incidental small aneurysm and recommendations to follow up with either [facility] or through the [facility]. Provided the phone number to schedule with [facility] to follow aneurysm. He verbalized understanding. He continues to take Aspirin and Atorvastatin. He completed the course of Plavix. He has a history of polyps and is scheduled for coloscopy on 2/14. GI confirmed that he does not have to stop his Aspirin prior to the procedure.    Impression / Plan:   1. MR negative presumed aborted stroke s/p IV Tenecteplase: 1 month post stroke with resolution of deficits         Secondary Stroke Prevention Recommendations:  -- Antiplatelet agent: dual antiplatelet therapy with Aspirin 81 mg and Plavix 75 mg x 30 days based on POINT and CHANCE trial results (complete). Continue monotherapy with Aspirin 81 mg daily as life-long therapy  -- Continue Atorvastatin 20 mg, LDL goal of <70. Recommend repeat fasting lipid panel through primary care.  -- Goal normotension BP < 130/80       2. 1.5 mm incidental right cavernous ICA aneurysm: Recommend outpatient consult with neurointerventional radiology to discuss natural history and address annual and lifetime rupture risk. We work closely with Dr. Velasco, Dr. Feliciano, and Dr. Vivar. To make an appointment call [phone] and speak with  Alda.          Functional Status  Modified [place] Scale Questionnaire Yes No Score   1. Do you have any remaining symptoms from the stroke (physical or cognitive)? [x] [] If Yes, score ?1   2. Are you unable to do any previous activities you did before your stroke (whether  due physical limitation or chosen to give up because of stroke)? [] [x] If Yes, score ?2   3. Are you dependent on others for ADL? (some supervision necessary; dressing, toileting, feeding) [] [x] If Yes, score ?3   4. Do you require assistance of other people to walk? (not just a cane or similar aid) [] [x] If Yes, score ?4   5. Do you require constant supervision? (Cannot be left alone for some hours of the day) [] [x] If Yes, score ?5   0 = No symptoms at all  1 = No significant disability despite symptoms; able to carry out all usual duties and activities  2 = Slight Disability; unable to carry out all previous activities, but able to look after own affairs without assistance.  3 = Moderate Disability; requiring some help, but able to walk without assistance.  4 = Moderately Severe Disability; unable to walk without assistance and unable to attend to own bodily needs without assistance  5 = Severe Disability; bedridden, incontinent and requiring constant nursing care and attention. Patient MRS Score: 1      Post-Discharge Care:    Have you had any ER visits since you were discharged? no    Have you had any hospital readmissions since you were discharged? no    Have you had any falls since you were discharged? no    Medications:     Current Outpatient Medications:     aspirin (ECOTRIN) 81 mg enteric coated tablet, Take 1 Tablet (81 mg) by mouth once daily., Disp: 30 Tablet, Rfl: 0    atorvastatin (LIPITOR) 20 mg tablet, Take 1 Tablet (20 mg) by mouth once daily., Disp: , Rfl: 0    escitalopram oxalate (LEXAPRO) 20 mg tablet, Take 20 mg by mouth every morning., Disp: , Rfl:     tamsulosin (FLOMAX) 0.4 mg capsule, Take 0.4 mg by mouth once daily., Disp: , Rfl:   Medications have been reviewed by me and are current to the best of my knowledge and ability.    LABS:  Chol: 209 2022  T 2022  HDL: 60 2022  LDL: 114 2022    HEMOGLOBIN A1C SCREENING   Date Value Ref Range Status   2022 5.5  <=6.4 % Final       Past Medical History:     Patient Active Problem List   Diagnosis     Pain in joint, lower leg     Encounter for other general counseling or advice on contraception     Encounter for sterilization     Anxiety     Family history of colon cancer     Family history of prostate cancer     Attention deficit hyperactivity disorder (ADHD)     Hyperlipidemia LDL goal <130     Gastroesophageal reflux disease without esophagitis     Gastric ulcer without hemorrhage or perforation, unspecified chronicity     Past Medical History:   Diagnosis Date     Gastroesophageal reflux disease without esophagitis 2018     Hyperlipidemia LDL goal <130 2018     Migraine, unspecified, without mention of intractable migraine without mention of status migrainosus      Orthostatic syncope      Sleep apnea         Past Surgical History:     Past Surgical History:   Procedure Laterality Date     COLONOSCOPY  2000     COLONOSCOPY  02/16/10     COLONOSCOPY N/A 2015    Procedure: COMBINED COLONOSCOPY, SINGLE OR MULTIPLE BIOPSY/POLYPECTOMY BY BIOPSY;  Surgeon: Dima Sosa MD;  Location:  GI     COLONOSCOPY N/A 2023    Procedure: COLONOSCOPY, WITH POLYPECTOMY AND BIOPSY;  Surgeon: Delroy Myers MD;  Location:  GI     HC CORRECT KIM BORREGO/DARNELL/JILLIAN  2005    Modified Darnell bunionectomy, right foot.     HERNIA REPAIR, INCISIONAL  2006    Incarcerated recurrent ventral hernia.     HERNIORRHAPHY UMBILICAL  3/29/2011    HERNIORRHAPHY UMBILICAL performed by ALOK WESLEY at  OR     [facility] REPAIR UMBILICAL HARDY,5+Y/O, INCARCERATED OR STRANGULATED  6/15/2004        Social History:     Social History     Tobacco Use     Smoking status: Former     Packs/day: 0.75     Years: 18.00     Pack years: 13.50     Types: Cigarettes     Quit date: 2006     Years since quittin.0     Smokeless tobacco: Never   Vaping Use     Vaping Use: Never used   Substance Use Topics      Alcohol use: Yes     Comment: occ     Drug use: No        Family History:     Family History   Problem Relation Age of Onset     Allergies Father         iodine     Heart Disease Father         heart attack at 59 - CABG at 60     Cerebrovascular Disease Father              Cancer Mother         colon -  at age 56     Cancer Sister         cervix -  at age 38     Neurologic Disorder Sister         CNS bleed x 2 sisters     Prostate Cancer Brother 58     Other Cancer Brother 62             Colon Cancer Sister 68                Medications:     Current Outpatient Medications   Medication Sig     ASPIRIN LOW DOSE 81 MG EC tablet      escitalopram (LEXAPRO) 20 MG tablet Take 1 tablet (20 mg) by mouth daily     rosuvastatin (CRESTOR) 10 MG tablet Take 1 tablet (10 mg) by mouth daily     tamsulosin (FLOMAX) 0.4 MG capsule TAKE 1 CAPSULE(0.4 MG) BY MOUTH DAILY     No current facility-administered medications for this visit.        Allergies:     Allergies   Allergen Reactions     No Known Allergies         Review of Systems:   As noted above     Physical Exam:   General: Seated comfortably in no acute distress.  Neurologic:     Mental Status: Fully alert, attentive and oriented. Speech clear and fluent, no paraphasic errors.     Cranial Nerves: EOMI with normal smooth pursuit. Facial movements symmetric. Hearing not formally tested but intact to conversation.  No dysarthria.     Motor: No tremors or other abnormal movements observed. No pronator drift, appears to be likely 5/5 as best can tell via video      Sensory:Not able to be tested virtually     Coordination: Finger-nose-finger without dysmetria.           Data: Pertinent prior to visit   Imaging:  Impression:   MRI Head:   1. No acute/subacute infarct.   2. Mild chronic ischemic microvascular disease.       MRA Head:   1. No hemodynamically significant stenosis or occlusion.   2. Previously noted right cavernous ICA aneurysm is  better appreciated on prior CTA 12/06/2022.     MRA Neck:   1. No evidence for hemodynamically significant ICA stenosis by NASCET criteria      I personally reviewed the above imaging and agree with the findings in the report. Unrevealing MRI without DWI changes. FLAIR sequence shown above also unrevealing    Procedures:  Final Impressions:    1. Echo contrast was administrered to enhance visualization of all left ventricular segments.    2. The aortic sinus is borderline dilated with a maximal diameter of 3.8 cm.    3. Normal LV size, normal wall thickness, normal global systolic function with an estimated EF of 55 - 60%.    4. Right ventricular cavity size is normal, global systolic RV function is normal.    5. No pericardial effusion.    6. No significant valve disease detected.    7. Negative bubble study.       Laboratory:  LDL Cholesterol Calculated   Date Value Ref Range Status   08/01/2022 98 <=100 mg/dL Final   09/15/2020 93 <100 mg/dL Final     Comment:     Desirable:       <100 mg/dl             The total time of this encounter today amounted to 24 minutes of time on video visit (7:52-8:16 AM)and 46 minutes in total. This time included time spent with the patient, prep work, ordering tests, and performing post visit documentation.

## 2023-02-23 ENCOUNTER — VIRTUAL VISIT (OUTPATIENT)
Dept: NEUROLOGY | Facility: CLINIC | Age: 58
End: 2023-02-23
Attending: PHYSICIAN ASSISTANT
Payer: COMMERCIAL

## 2023-02-23 ENCOUNTER — PRE VISIT (OUTPATIENT)
Dept: NEUROLOGY | Facility: CLINIC | Age: 58
End: 2023-02-23

## 2023-02-23 DIAGNOSIS — G43.009 MIGRAINE WITHOUT AURA AND WITHOUT STATUS MIGRAINOSUS, NOT INTRACTABLE: Primary | ICD-10-CM

## 2023-02-23 DIAGNOSIS — I63.9 CEREBROVASCULAR ACCIDENT (CVA), UNSPECIFIED MECHANISM (H): ICD-10-CM

## 2023-02-23 PROCEDURE — 99204 OFFICE O/P NEW MOD 45 MIN: CPT | Mod: VID | Performed by: STUDENT IN AN ORGANIZED HEALTH CARE EDUCATION/TRAINING PROGRAM

## 2023-02-23 NOTE — LETTER
2/23/2023         RE: Rebel Garcia  [id number]  [facility] [address]        Dear Colleague,    Thank you for referring your patient, Rebel Garcia, to the [facility] [place]. Please see a copy of my visit note below.    [facility]  Section of General Neurology  New Patient  Virtual Visit      Rebel Garcia MRN# [id number]   Age: 57 year old YOB: 1965     Requesting physician: Marilu Gagnon     Reason for Consultation: Post stroke cares           Assessment and Plan:   Assessment:  Rebel Garcia is a pleasant 57 year old man seen in post stroke cares.  To review he had what sounds to be most likely an MRI negative stroke with left sided weakness as most prominent symptom (TNKplase likely dissolved clot prior to DWI changes manifesting).  Work up to date has been unrevealing with zio patch yet to be obtained.  Discussed rationale for aspirin/statin as currently, why OK to stop plavix previously.  Overall he has recovered quite well though still has fatigue at times confounded by stopped ADD medicine post stroke.  Overall I am pleased with his recovery.  Discussed future stroke prevention and plan as below.  All questions answered.     Plan:  Magnesium oxide 400 mg Riboflavin (vitamin b2) 400 mg daily for migraine prevention  Aspirin: 162 mg or 325 mg daily but if bleeding issues/ go back to the 81 mg  Statin: To continue: Goal LDL <70  Zio patch: to look for atrial flutter/fibrillation, this could  potentially  Do see neuroendovascular as previously referred for serial monitoring of small incidental aneurysm  I agree with MRI negative stroke as the most likely explanation as above  Imitrex: theoretical risk post stroke discussed, prefer excedrin migraine as abortive options--other options safe post stroke: nurtec or ubrelvy.   To go to ER right away again as you did if stroke like symptoms manifest  again  OK to slowly resume ADD meds if desired with close BP monitoring.               Cody Simon MD   of Neurology   [facility]      History of Presenting Symptoms:   Rebel Garcia is a 57 year old male who presents today for evaluation of post stroke cares    He lives in [facility]  He works as maintenance  manager    He recalls the day of: face drooping on the left and left leg quite weak.   Migraine: had one for a few days prior to this, gets one once q 3 months or so.  Discussed day of presentation at length.    Discussed imitrex and stroke risk.    Discussed MRI negative stroke, that it is possible/probable that TNK-plase dissolved clot  Heart rhythm monitoring: is being coordinated by his PCP.      He feels very good.  He still is fatigued, he notes.    He feels this could be secondary to ADD meds, discussed would be OK to resume with close BP monitoring.    Has previously quit smoking    Migraines: discussed Magnesium oxide 400 mg Riboflavin (vitamin b2) 400 mg daily  He notes sunlight, dark chocolate are triggers.              Recent o/p stroke note reviewed from [facility]:  Brief HPI: Rebel Garcia is a 57 y.o. 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  Mr. Garcia presented to [facility] ER on 12/6/2022 via EMS with two day history of migraine headache and new left facial droop that morning. BP was elevated to 180/112.   A stroke code was called.  NIHSS was 9 (1 best gaze, 1 visual fields, 2 facial palsy, 2 left arm, 1 left leg, 1 dysarthria, 1 neglect).  CTA of the head and neck revealed incidental 1.5 mm right cavernous ICA aneurysm but was otherwise unremarkable.  He received IV Tenecteplase.  He was transferred to [facility] for continuing care and admitted to the NeuroICU for close neurologic monitoring.  Neurologically he improved dramatically.  NIHSS was 2 (1  "left arm, 1 left leg) on arrival to the NeuroICU.  Subsequent MRI without evidence of acute infarction.  KOSTA revealed normal LV size and function with EF 55-60%, borderline dilated aortic sinus, and negative bubble study. He suffered an MR negative presumed aborted stroke s/p IV Tenecteplase.  Neurologically he improved.  NIHSS was 0 on the day of discharge.   SLUMS 26/30.  12/7 Discharged home.  Secondary stroke prevention: started Aspirin, Plavix, and Atorvastatin 20 mg (), hgb A1c 5.5.  Dual antiplatelet therapy x 30 days followed by monotherapy with Aspirin was planned.     Comorbidities:  Dyslipidemia  Obstructive sleep apnea treated with CPAP  Migraines  Hx gastric ulcer  ADHD  BPH  Hx tobacco use (quit '06)     Social History: Lives in one story with roommate, employed (), drove prior to hospitalization.        1/3/2023: Case reviewed with Stroke Medical Director Dr. Lio Cotton. Symptoms thought to be due to aborted stroke vs stroke mimic. Will treat as aborted stroke and address modifiable stroke risk factors. Recommend 14 day zio patch to complete work up.       1/11/2023 Interval History: At the time of one month follow up via phone office visit, Rebel Garcia stated he was doing \"awesome\". \"I had been going to physical therapy and even he said this looks all good.\" He doesn't note any residual left sided weakness. He denies any facial droop. The headache resolved. He feels completely normal \"other than maybe memory issues, but that is part of getting old\". He describes fatigue since he stopped his Methylphenidate. He relayed that primary care discontinued his stimulant after the stroke and recommended that he follow up with psychiatry for ADHD management. Her indicated it will likely be many months until he can get an appointment. Discussed the discontinuing stimulants is not an absolute post stroke. If ADHD is disabling recommend follow up with neurology through " [facility] to discuss resuming Methylphenidate.     We discussed his modifiable stroke risk factors (dyslipidemia). He has no known history of hypertensin. BP was 120/80 at follow up with primary care on 12/28. Discussed that we suspect that his symptoms were caused by a stroke aborted by IV Tenecteplase. We do recommend 14 day cardiac monitor to evaluate for paroxysmal atrial fibrillation. He recalls wearing a cardiac monitor a few years ago and that his insurance company didn't cover it. He prefer to check with insurance regarding coverage before I order it. He will contact us for order after he has spoken with his insurance company. Discussed the incidental small aneurysm and recommendations to follow up with either [facility] or through the [facility]. Provided the phone number to schedule with [facility] to follow aneurysm. He verbalized understanding. He continues to take Aspirin and Atorvastatin. He completed the course of Plavix. He has a history of polyps and is scheduled for coloscopy on 2/14. GI confirmed that he does not have to stop his Aspirin prior to the procedure.    Impression / Plan:   1. MR negative presumed aborted stroke s/p IV Tenecteplase: 1 month post stroke with resolution of deficits         Secondary Stroke Prevention Recommendations:  -- Antiplatelet agent: dual antiplatelet therapy with Aspirin 81 mg and Plavix 75 mg x 30 days based on POINT and CHANCE trial results (complete). Continue monotherapy with Aspirin 81 mg daily as life-long therapy  -- Continue Atorvastatin 20 mg, LDL goal of <70. Recommend repeat fasting lipid panel through primary care.  -- Goal normotension BP < 130/80       2. 1.5 mm incidental right cavernous ICA aneurysm: Recommend outpatient consult with neurointerventional radiology to discuss natural history and address annual and lifetime rupture risk. We work closely with Dr. Velasco, Dr. Feliciano, and Dr. Vivar. To make an appointment call [phone] and speak with   Alda.          Functional Status  Modified Rubio Scale Questionnaire Yes No Score   1. Do you have any remaining symptoms from the stroke (physical or cognitive)? [x] [] If Yes, score ?1   2. Are you unable to do any previous activities you did before your stroke (whether due physical limitation or chosen to give up because of stroke)? [] [x] If Yes, score ?2   3. Are you dependent on others for ADL? (some supervision necessary; dressing, toileting, feeding) [] [x] If Yes, score ?3   4. Do you require assistance of other people to walk? (not just a cane or similar aid) [] [x] If Yes, score ?4   5. Do you require constant supervision? (Cannot be left alone for some hours of the day) [] [x] If Yes, score ?5   0 = No symptoms at all  1 = No significant disability despite symptoms; able to carry out all usual duties and activities  2 = Slight Disability; unable to carry out all previous activities, but able to look after own affairs without assistance.  3 = Moderate Disability; requiring some help, but able to walk without assistance.  4 = Moderately Severe Disability; unable to walk without assistance and unable to attend to own bodily needs without assistance  5 = Severe Disability; bedridden, incontinent and requiring constant nursing care and attention. Patient MRS Score: 1      Post-Discharge Care:    Have you had any ER visits since you were discharged? no    Have you had any hospital readmissions since you were discharged? no    Have you had any falls since you were discharged? no    Medications:     Current Outpatient Medications:     aspirin (ECOTRIN) 81 mg enteric coated tablet, Take 1 Tablet (81 mg) by mouth once daily., Disp: 30 Tablet, Rfl: 0    atorvastatin (LIPITOR) 20 mg tablet, Take 1 Tablet (20 mg) by mouth once daily., Disp: , Rfl: 0    escitalopram oxalate (LEXAPRO) 20 mg tablet, Take 20 mg by mouth every morning., Disp: , Rfl:     tamsulosin (FLOMAX) 0.4 mg capsule, Take 0.4 mg by mouth once  daily., Disp: , Rfl:   Medications have been reviewed by me and are current to the best of my knowledge and ability.    LABS:  Chol: 209 2022  T 2022  HDL: 60 2022  LDL: 114 2022    HEMOGLOBIN A1C SCREENING   Date Value Ref Range Status   2022 5.5 <=6.4 % Final       Past Medical History:     Patient Active Problem List   Diagnosis     Pain in joint, lower leg     Encounter for other general counseling or advice on contraception     Encounter for sterilization     Anxiety     Family history of colon cancer     Family history of prostate cancer     Attention deficit hyperactivity disorder (ADHD)     Hyperlipidemia LDL goal <130     Gastroesophageal reflux disease without esophagitis     Gastric ulcer without hemorrhage or perforation, unspecified chronicity     Past Medical History:   Diagnosis Date     Gastroesophageal reflux disease without esophagitis 2018     Hyperlipidemia LDL goal <130 2018     Migraine, unspecified, without mention of intractable migraine without mention of status migrainosus      Orthostatic syncope      Sleep apnea         Past Surgical History:     Past Surgical History:   Procedure Laterality Date     COLONOSCOPY  2000     COLONOSCOPY  02/16/10     COLONOSCOPY N/A 2015    Procedure: COMBINED COLONOSCOPY, SINGLE OR MULTIPLE BIOPSY/POLYPECTOMY BY BIOPSY;  Surgeon: Dima Sosa MD;  Location:  GI     COLONOSCOPY N/A 2023    Procedure: COLONOSCOPY, WITH POLYPECTOMY AND BIOPSY;  Surgeon: Delroy Myers MD;  Location:  GI      CORRECT KIM BORREGO/DARNELL/JILLIAN  2005    Modified Darnell bunionectomy, right foot.     HERNIA REPAIR, INCISIONAL  2006    Incarcerated recurrent ventral hernia.     HERNIORRHAPHY UMBILICAL  3/29/2011    HERNIORRHAPHY UMBILICAL performed by ALOK WESLEY at  OR     [facility] REPAIR UMBILICAL HARDY,5+Y/O, INCARCERATED OR STRANGULATED  6/15/2004        Social History:     Social  History     Tobacco Use     Smoking status: Former     Packs/day: 0.75     Years: 18.00     Pack years: 13.50     Types: Cigarettes     Quit date: 2006     Years since quittin.0     Smokeless tobacco: Never   Vaping Use     Vaping Use: Never used   Substance Use Topics     Alcohol use: Yes     Comment: occ     Drug use: No        Family History:     Family History   Problem Relation Age of Onset     Allergies Father         iodine     Heart Disease Father         heart attack at 59 - CABG at 60     Cerebrovascular Disease Father              Cancer Mother         colon -  at age 56     Cancer Sister         cervix -  at age 38     Neurologic Disorder Sister         CNS bleed x 2 sisters     Prostate Cancer Brother 58     Other Cancer Brother 62             Colon Cancer Sister 68                Medications:     Current Outpatient Medications   Medication Sig     ASPIRIN LOW DOSE 81 MG EC tablet      escitalopram (LEXAPRO) 20 MG tablet Take 1 tablet (20 mg) by mouth daily     rosuvastatin (CRESTOR) 10 MG tablet Take 1 tablet (10 mg) by mouth daily     tamsulosin (FLOMAX) 0.4 MG capsule TAKE 1 CAPSULE(0.4 MG) BY MOUTH DAILY     No current facility-administered medications for this visit.        Allergies:     Allergies   Allergen Reactions     No Known Allergies         Review of Systems:   As noted above     Physical Exam:   General: Seated comfortably in no acute distress.  Neurologic:     Mental Status: Fully alert, attentive and oriented. Speech clear and fluent, no paraphasic errors.     Cranial Nerves: EOMI with normal smooth pursuit. Facial movements symmetric. Hearing not formally tested but intact to conversation.  No dysarthria.     Motor: No tremors or other abnormal movements observed. No pronator drift, appears to be likely 5/5 as best can tell via video      Sensory:Not able to be tested virtually     Coordination: Finger-nose-finger without  dysmetria.           Data: Pertinent prior to visit   Imaging:  Impression:   MRI Head:   1. No acute/subacute infarct.   2. Mild chronic ischemic microvascular disease.       MRA Head:   1. No hemodynamically significant stenosis or occlusion.   2. Previously noted right cavernous ICA aneurysm is better appreciated on prior CTA 12/06/2022.     MRA Neck:   1. No evidence for hemodynamically significant ICA stenosis by NASCET criteria      I personally reviewed the above imaging and agree with the findings in the report. Unrevealing MRI without DWI changes. FLAIR sequence shown above also unrevealing    Procedures:  Final Impressions:    1. Echo contrast was administrered to enhance visualization of all left ventricular segments.    2. The aortic sinus is borderline dilated with a maximal diameter of 3.8 cm.    3. Normal LV size, normal wall thickness, normal global systolic function with an estimated EF of 55 - 60%.    4. Right ventricular cavity size is normal, global systolic RV function is normal.    5. No pericardial effusion.    6. No significant valve disease detected.    7. Negative bubble study.       Laboratory:  LDL Cholesterol Calculated   Date Value Ref Range Status   08/01/2022 98 <=100 mg/dL Final   09/15/2020 93 <100 mg/dL Final     Comment:     Desirable:       <100 mg/dl             The total time of this encounter today amounted to 24 minutes of time on video visit (7:52-8:16 AM)and 46 minutes in total. This time included time spent with the patient, prep work, ordering tests, and performing post visit documentation.      Rebel is a 57 year old who is being evaluated via a billable video visit.      How would you like to obtain your AVS? MyChart  If the video visit is dropped, the invitation should be resent by: Text to cell phone: [phone]  Will anyone else be joining your video visit? No      Video-Visit Details    Type of service:  Video Visit     Originating Location (pt. Location):  Home  Distant Location (provider location):  Off-site  Platform used for Video Visit: POP Hurtado, KRYS ([facility])        Again, thank you for allowing me to participate in the care of your patient.        Sincerely,        Keith Simon MD

## 2023-02-23 NOTE — PATIENT INSTRUCTIONS
Magnesium oxide 400 mg Riboflavin (vitamin b2) 400 mg daily    Aspirin: 162 mg or 325 mg daily but if bleeding issues/ go back to the 81 mg  Statin: Continue this: Goal LDL <70  Zio patch: to look for atrial flutter/fibrillation, this could  potentially    I agree with MRI negative stroke as the most likely explanation    Imitrex: theoretical risk post stroke, prefer excedrin migraine--other options: nurtec or ubrelvy.      Keep an eye on BP, OK to slowly resume ADD meds if desired.

## 2023-02-23 NOTE — PROGRESS NOTES
Rebel is a 57 year old who is being evaluated via a billable video visit.      How would you like to obtain your AVS? CISSOIDhart  If the video visit is dropped, the invitation should be resent by: Text to cell phone: [phone]  Will anyone else be joining your video visit? No      Video-Visit Details    Type of service:  Video Visit     Originating Location (pt. Location): Home  Distant Location (provider location):  Off-site  Platform used for Video Visit: POP Hurtado, KRYS ([facility])

## 2023-04-11 DIAGNOSIS — F41.9 ANXIETY: ICD-10-CM

## 2023-04-11 RX ORDER — ESCITALOPRAM OXALATE 20 MG/1
20 TABLET ORAL DAILY
Qty: 90 TABLET | Refills: 1 | Status: SHIPPED | OUTPATIENT
Start: 2023-04-11 | End: 2023-08-28

## 2023-07-03 ENCOUNTER — PATIENT OUTREACH (OUTPATIENT)
Dept: CARE COORDINATION | Facility: CLINIC | Age: 58
End: 2023-07-03
Payer: COMMERCIAL

## 2023-07-17 ENCOUNTER — PATIENT OUTREACH (OUTPATIENT)
Dept: CARE COORDINATION | Facility: CLINIC | Age: 58
End: 2023-07-17
Payer: COMMERCIAL

## 2023-08-28 ENCOUNTER — OFFICE VISIT (OUTPATIENT)
Dept: FAMILY MEDICINE | Facility: CLINIC | Age: 58
End: 2023-08-28
Payer: COMMERCIAL

## 2023-08-28 VITALS
RESPIRATION RATE: 10 BRPM | OXYGEN SATURATION: 97 % | HEART RATE: 77 BPM | DIASTOLIC BLOOD PRESSURE: 88 MMHG | WEIGHT: 215.8 LBS | SYSTOLIC BLOOD PRESSURE: 126 MMHG | HEIGHT: 71 IN | TEMPERATURE: 98.7 F | BODY MASS INDEX: 30.21 KG/M2

## 2023-08-28 DIAGNOSIS — I63.9 CEREBROVASCULAR ACCIDENT (CVA), UNSPECIFIED MECHANISM (H): Primary | ICD-10-CM

## 2023-08-28 DIAGNOSIS — Z12.5 SCREENING FOR PROSTATE CANCER: ICD-10-CM

## 2023-08-28 DIAGNOSIS — G43.909 MIGRAINE WITHOUT STATUS MIGRAINOSUS, NOT INTRACTABLE, UNSPECIFIED MIGRAINE TYPE: ICD-10-CM

## 2023-08-28 DIAGNOSIS — N40.1 BENIGN PROSTATIC HYPERPLASIA WITH INCOMPLETE BLADDER EMPTYING: ICD-10-CM

## 2023-08-28 DIAGNOSIS — E78.5 HYPERLIPIDEMIA LDL GOAL <130: ICD-10-CM

## 2023-08-28 DIAGNOSIS — G47.33 OSA (OBSTRUCTIVE SLEEP APNEA): ICD-10-CM

## 2023-08-28 DIAGNOSIS — I69.311 MEMORY DEFICIT AFTER CEREBRAL INFARCTION: ICD-10-CM

## 2023-08-28 DIAGNOSIS — R39.14 BENIGN PROSTATIC HYPERPLASIA WITH INCOMPLETE BLADDER EMPTYING: ICD-10-CM

## 2023-08-28 DIAGNOSIS — F41.9 ANXIETY: ICD-10-CM

## 2023-08-28 PROCEDURE — 99214 OFFICE O/P EST MOD 30 MIN: CPT | Performed by: PHYSICIAN ASSISTANT

## 2023-08-28 RX ORDER — BUPROPION HYDROCHLORIDE 150 MG/1
150 TABLET ORAL EVERY MORNING
Qty: 30 TABLET | Refills: 1 | Status: SHIPPED | OUTPATIENT
Start: 2023-08-28 | End: 2023-11-03

## 2023-08-28 RX ORDER — ESCITALOPRAM OXALATE 20 MG/1
20 TABLET ORAL DAILY
Qty: 90 TABLET | Refills: 1 | Status: SHIPPED | OUTPATIENT
Start: 2023-08-28 | End: 2023-11-10

## 2023-08-28 RX ORDER — ROSUVASTATIN CALCIUM 10 MG/1
10 TABLET, COATED ORAL DAILY
Qty: 90 TABLET | Refills: 1 | Status: SHIPPED | OUTPATIENT
Start: 2023-08-28 | End: 2024-05-10

## 2023-08-28 ASSESSMENT — ENCOUNTER SYMPTOMS
MYALGIAS: 1
FREQUENCY: 1
NERVOUS/ANXIOUS: 1

## 2023-08-28 ASSESSMENT — PAIN SCALES - GENERAL: PAINLEVEL: NO PAIN (0)

## 2023-08-28 NOTE — PROGRESS NOTES
SUBJECTIVE:   CC: Rebel is an 58 year old who presents for preventative health visit.       2023     2:07 PM   Additional Questions   Roomed by Chiara CANTU   Accompanied by None         2023     2:07 PM   Patient Reported Additional Medications   Patient reports taking the following new medications NA       Healthy Habits:     Getting at least 3 servings of Calcium per day:  Yes    Bi-annual eye exam:  NO    Dental care twice a year:  Yes    Sleep apnea or symptoms of sleep apnea:  Daytime drowsiness    Diet:  Regular (no restrictions)    Frequency of exercise:  2-3 days/week    Duration of exercise:  15-30 minutes    Taking medications regularly:  Yes    Medication side effects:  Not applicable    Additional concerns today:  Yes        {Outside tests to abstract? :892888}    {additional problems to add (Optional):361307}      Social History     Tobacco Use    Smoking status: Some Days     Packs/day: 0.00     Years: 18.00     Pack years: 0.00     Types: Cigarettes, Dip, chew, snus or snuff     Last attempt to quit: 2006     Years since quittin.5    Smokeless tobacco: Current     Last attempt to quit: 2023   Substance Use Topics    Alcohol use: Yes     Comment: occ     {Rooming staff  Click this link to complete the Prescreen if response below is not for today's visit  Alcohol Use Prescreen >3 drinks/day or > 7 drinks/week.  If the prescreen question answer is YES, complete the full AUDIT  :874022}        2023     7:29 AM   Alcohol Use   Prescreen: >3 drinks/day or >7 drinks/week? No       Last PSA:   PSA   Date Value Ref Range Status   2020 0.87 0 - 4 ug/L Final     Comment:     Assay Method:  Chemiluminescence using Siemens Vista analyzer     Prostate Specific Antigen Screen   Date Value Ref Range Status   2022 2.83 0.00 - 4.00 ug/L Final       Reviewed orders with patient. Reviewed health maintenance and updated orders accordingly - { :461875}  {Chronicprobdata  "(optional):497904}    Reviewed and updated as needed this visit by clinical staff   Tobacco  Allergies  Meds              Reviewed and updated as needed this visit by Provider                 {HISTORY OPTIONS (Optional):153387}    Review of Systems   Genitourinary:  Positive for frequency and urgency. Negative for impotence and penile discharge.   Musculoskeletal:  Positive for myalgias.   Psychiatric/Behavioral:  The patient is nervous/anxious.      {MALE ROS (Optional):002858}    OBJECTIVE:   There were no vitals taken for this visit.    Physical Exam  {Exam Choices (Optional):105788}    {Diagnostic Test Results (Optional):917543}    ASSESSMENT/PLAN:   {Diag Picklist:482103}    Patient has been advised of split billing requirements and indicates understanding: Yes      COUNSELING:   {MALE COUNSELING MESSAGES:355576::\"Reviewed preventive health counseling, as reflected in patient instructions\"}      BMI:   Estimated body mass index is 29.01 kg/m  as calculated from the following:    Height as of 2/21/23: 1.825 m (5' 11.85\").    Weight as of 2/21/23: 96.6 kg (213 lb).   {Weight Management Plan needed for ACO:968779}      He reports that he has been smoking cigarettes and dip, chew, snus or snuff. He uses smokeless tobacco.  Nicotine/Tobacco Cessation Plan:   {Nicotine/Tobacco Cessation Plan:771189}      {Counseling Resources  [place] Preventive Services Task Force  Cholesterol Screening  Health diet/nutrition  Pooled Cohorts Equation Calculator  USDA's MyPlate  ASA Prophylaxis  Lung CA Screening  Osteoporosis prevention/bone health :961490}  {Prostate Cancer Screening  Consider for men 55-69 per guidance from USPSTF :146824}    Marilu Gomez PA-C  [facility]  "

## 2023-08-28 NOTE — PATIENT INSTRUCTIONS
The use of imitrex ( sumatriptan) is not advised after a stroke as I suspected so I am not able to prescribe this medication for you.

## 2023-08-28 NOTE — PROGRESS NOTES
Assessment & Plan     Cerebrovascular accident (CVA), unspecified mechanism (H)  We will refer him to neurology to discuss his lingering memory deficits, history of migraine headaches and to monitor him post stroke.  I have encouraged him to take his aspirin a day which he has not been taking and we will optimize his treatment of hyperlipidemia depending on his results.  He did not continue his neuro care at [facility] since it was not covered under his insurance.  He has not completed the Zio patch.  We discussed the importance of this I have ordered it again he will check into insurance coverage discussed that it is important to rule out A-fib.  - rosuvastatin (CRESTOR) 10 MG tablet; Take 1 tablet (10 mg) by mouth daily  - Lipid panel reflex to direct LDL Fasting; Future  - Comprehensive metabolic panel; Future  - Adult Leadless EKG Monitor 3 to 7 Days; Future  - Adult Neurology  Referral; Future    Obstructive sleep apnea-- and will continue to wear his CPAP machine    Hyperlipidemia-lipids pending, will likely increase rosuvastatin to 20 mg if necessary    Migraine without status migrainosus, not intractable, unspecified migraine type  He requests Imitrex prescription however this is contraindicated in somebody who has had a CVA.  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  His his significant other is very concerned regarding memory, will refer to neurology for further evaluation as well, he does have a history of ADHD and attention could certainly explain some of his memory issues  - Adult Neurology  Referral; Future    Anxiety  We will add back in bupropion per his request continue current dosage of Lexapro  - escitalopram (LEXAPRO) 20 MG tablet; Take 1 tablet (20 mg) by mouth daily  - buPROPion (WELLBUTRIN XL) 150 MG 24 hr tablet; Take 1 tablet (150 mg) by mouth every  "morning  We will follow-up in 1 month to ensure improvement this can be virtual    Screening for prostate cancer    - Prostate Specific Antigen Screen; Future    Benign prostatic hyperplasia with incomplete bladder emptying  Patient states he does not need a refill of the Flomax at this time  - Prostate Specific Antigen Screen; Future       BMI:   Estimated body mass index is 30.08 kg/m  as calculated from the following:    Height as of this encounter: 1.804 m (5' 11.02\").    Weight as of this encounter: 97.9 kg (215 lb 12.8 oz).   Weight management plan: Discussed healthy diet and exercise guidelines        Marilu Gomez PA-C  [facility] [place]    Subjective   he does not want to do a physical today only on med recheck  Rebel is a 58 year old, presenting for the following health issues:  Recheck Medication        8/28/2023     2:07 PM   Additional Questions   Roomed by Chiara CANTU   Accompanied by Suzette          8/28/2023     2:07 PM   Patient Reported Additional Medications   Patient reports taking the following new medications NA     Would like to discuss \"Budaprin\" -- bupropion is what he means.  He states he could do better with a little more energy and better control of his anxiety.  He believes his depression is pretty well controlled.  He is here today with his significant other.  He is still in the process of going through a divorce from Ruth.  He continues to be so tired.  He does have sleep apnea and is using his CPAP machine.  Sometimes the seal leaks and then he ends up taking it off in his sleep but he will replace it if he notices it but ultimately some nights he just takes it off because he gets tired of dealing with it.    Healthy Habits:     Getting at least 3 servings of Calcium per day:  Yes    Bi-annual eye exam:  NO    Dental care twice a year:  Yes    Sleep apnea or symptoms of sleep apnea:  Daytime drowsiness    Diet:  Regular (no restrictions)    Frequency of exercise:  2-3 " days/week    Duration of exercise:  15-30 minutes    Taking medications regularly:  Yes    Medication side effects:  Not applicable    Additional concerns today:  Yes     Hyperlipidemia Follow-Up    Are you regularly taking any medication or supplement to lower your cholesterol?   Yes- Crestor   Are you having muscle aches or other side effects that you think could be caused by your cholesterol lowering medication?  Yes- At night   How many servings of fruits and vegetables do you eat daily?  0-1  On average, how many sweetened beverages do you drink each day (Examples: soda, juice, sweet tea, etc.  Do NOT count diet or artificially sweetened beverages)?   1  How many days per week do you exercise enough to make your heart beat faster? 3 or less  How many minutes a day do you exercise enough to make your heart beat faster? 9 or less  How many days per week do you miss taking your medication? 0    Anxiety Follow-Up  How are you doing with your anxiety since your last visit? No change, see above  Are you having other symptoms that might be associated with anxiety? No  Have you had a significant life event? No   Are you feeling depressed? No  Do you have any concerns with your use of alcohol or other drugs? No    Social History     Tobacco Use    Smoking status: Some Days     Packs/day: 0.00     Years: 18.00     Pack years: 0.00     Types: Cigarettes, Dip, chew, snus or snuff     Last attempt to quit: 2006     Years since quittin.5    Smokeless tobacco: Current     Last attempt to quit: 2023   Vaping Use    Vaping Use: Every day    Substances: Nicotine    Devices: Disposable   Substance Use Topics    Alcohol use: Yes     Comment: occ    Drug use: No         2020     1:36 PM 2021     7:00 AM 2021     1:05 PM   LUIS-7 SCORE   Total Score 12 (moderate anxiety) 4 (minimal anxiety)    Total Score 12 4 1         2020     1:36 PM 2021     6:59 AM 2021     1:05 PM   PHQ   PHQ-9 Total  Score 13 4 1   Q9: Thoughts of better off dead/self-harm past 2 weeks Not at all Not at all Not at all         5/24/2021     1:05 PM   Last PHQ-9   1.  Little interest or pleasure in doing things 1   2.  Feeling down, depressed, or hopeless 0   3.  Trouble falling or staying asleep, or sleeping too much 0   4.  Feeling tired or having little energy 0   5.  Poor appetite or overeating 0   6.  Feeling bad about yourself 0   7.  Trouble concentrating 0   8.  Moving slowly or restless 0   Q9: Thoughts of better off dead/self-harm past 2 weeks 0   PHQ-9 Total Score 1         5/24/2021     1:05 PM   LUIS-7    1. Feeling nervous, anxious, or on edge 1   2. Not being able to stop or control worrying 0   3. Worrying too much about different things 0   4. Trouble relaxing 0   5. Being so restless that it is hard to sit still 0   6. Becoming easily annoyed or irritable 0   7. Feeling afraid, as if something awful might happen 0   LUIS-7 Total Score 1       Cerebrovascular Follow-up    Patient history: ischemic cerebrovascular incident  Residual symptoms: Memory concerns, his significant other is here today to ensure he does discuss that with me.  He will tell her the same thing multiple times a day or he will forget entire conversations they had the day before.  They both think he is doing well at work there is not been issues with difficulty with memory there.  But they do admit that their work is slow currently so it may not be a true test of how he does.  Worsened or new symptoms since last visit:  YES memory concerns as above  Daily aspirin use: No, when asked why he is not taking aspirin he does not think he needs it.  Hypertension controlled: Yes  His CVA occurred December 6, 2022.  He was inpatient at [facility].  He was to follow-up with their neurology department however his insurance was not covered there so he opted not to pursue follow-up.  He was supposed to do a Zio patch which he did not complete.  He is done in the  "past and it was not covered by his insurance so he did not want to do another one.  He is not aware of any palpitations.  He does have sleep apnea and is wearing his CPAP machine.  Other than his memory he feels that he has completely improved.  He has had no residual facial drooping or any recent weakness, or paresthesias of his limbs.  He does get migraine headaches which occur with weather changes mainly.  He did use his ex-wife's Imitrex and would like his own prescription.  He just wants a few pills to use if needed.      Review of Systems   Genitourinary:  Positive for frequency and urgency. Negative for impotence and penile discharge.   Musculoskeletal:  Positive for myalgias.   Psychiatric/Behavioral:  The patient is nervous/anxious.       He uses tamsulosin for urinary frequency this med is helpful but he does not believe he needs refills      Objective    /88 (BP Location: Left arm, Patient Position: Chair, Cuff Size: Adult Regular)   Pulse 77   Temp 98.7  F (37.1  C) (Temporal)   Resp 10   Ht 1.804 m (5' 11.02\")   Wt 97.9 kg (215 lb 12.8 oz)   SpO2 97%   BMI 30.08 kg/m    Body mass index is 30.08 kg/m .  Physical Exam   GENERAL: healthy, alert and no distress  NECK: no adenopathy, no asymmetry, masses, or scars and thyroid normal to palpation  RESP: lungs clear to auscultation - no rales, rhonchi or wheezes  CV: regular rate and rhythm, normal S1 S2, no S3 or S4, no murmur, click or rub, no peripheral edema and peripheral pulses strong  ABDOMEN: soft, nontender, no hepatosplenomegaly, no masses and bowel sounds normal  MS: no gross musculoskeletal defects noted, no edema  NEURO: Normal strength and tone, mentation intact and speech normal, I do not detect any memory issues during our exam today  PSYCH: mentation appears normal, affect normal/bright    Future labs ordered  Admission on 02/14/2023, Discharged on 02/14/2023   Component Date Value Ref Range Status    Case Report 02/14/2023    " Final                    Value:Surgical Pathology Report                         Case: [id number]                                  Authorizing Provider:  Delroy Myers MD        Collected:           02/14/2023 10:08 AM          Ordering Location:     [facility]          Received:            02/14/2023 11:29 AM                                 [facility]                                                          Pathologist:           Jesus Rivera MD                                                           Specimen:    Large Intestine, Colon, Sigmoid                                                            Final Diagnosis 02/14/2023    Final                    Value:This result contains rich text formatting which cannot be displayed here.    Clinical Information 02/14/2023    Final                    Value:This result contains rich text formatting which cannot be displayed here.    Gross Description 02/14/2023    Final                    Value:This result contains rich text formatting which cannot be displayed here.    Microscopic Description 02/14/2023    Final                    Value:This result contains rich text formatting which cannot be displayed here.    Performing Labs 02/14/2023    Final                    Value:This result contains rich text formatting which cannot be displayed here.    COLONOSCOPY 02/14/2023    Final                    Value:[address] [address]  _______________________________________________________________________________  Patient Name: Rebel Garcia           Procedure Date: 2/14/2023 9:52 AM  MRN: [id number]                       Account Number: [id number]  YOB: 1965              Admit Type: Outpatient  Age: 57                               Room: [place] [address]  Gender: Male                          Note Status: Finalized  Attending MD: DELROY MYERS ,        Total Sedation Time:    _______________________________________________________________________________     Procedure:             Colonoscopy  Indications:           Last colonoscopy: 2015, Family history of colon cancer                          in multiple first-degree relatives  Providers:             SLIM Ozuna MD:          BRIANNA ARDON  Medicines:             Monitored Anesthesia Care  Complications:         No immediate complications.  __________________                          _____________________________________________________________  Procedure:             After obtaining informed consent, the colonoscope was                          passed under direct vision. Throughout the procedure,                          the patient's blood pressure, pulse, and oxygen                          saturations were monitored continuously. The                          Colonoscope was introduced through the anus and                          advanced to the cecum, identified by appendiceal                          orifice and ileocecal valve. The patient tolerated the                          procedure well. The quality of the bowel preparation                          was good.                                                                                   Findings:       A 3 mm polyp was found in the sigmoid colon. The polyp was sessile. The        polyp was removed with a cold biopsy forceps. Resection and retrieval        were complete.       The exam was otherwise                           without abnormality.       A single diverticulum was found in the sigmoid colon.                                                                                   Impression:            - One 3 mm polyp in the sigmoid colon, removed with a                          cold biopsy forceps. Resected and retrieved.                         - The examination was otherwise normal.                         Mother with CRC age 50s. Father  with CRC age 80s.  Recommendation:        - Await pathology results.                         - Repeat colonoscopy in 5 years for surveillance.                                                                                     Dr. Slim Myers  ________________  SLIM MYERS,   2/14/2023 10:14:30 AM  I was physically present for the entire viewing portion of the exam.SLIM MYERS  Number of Addenda: 0    Note Initiated On: 2/14/2023 9:52 AM

## 2023-08-30 ENCOUNTER — LAB (OUTPATIENT)
Dept: LAB | Facility: CLINIC | Age: 58
End: 2023-08-30
Payer: COMMERCIAL

## 2023-08-30 DIAGNOSIS — E78.5 HYPERLIPIDEMIA LDL GOAL <130: ICD-10-CM

## 2023-08-30 DIAGNOSIS — Z12.5 SCREENING FOR PROSTATE CANCER: ICD-10-CM

## 2023-08-30 DIAGNOSIS — I63.9 CEREBROVASCULAR ACCIDENT (CVA), UNSPECIFIED MECHANISM (H): ICD-10-CM

## 2023-08-30 DIAGNOSIS — R39.14 BENIGN PROSTATIC HYPERPLASIA WITH INCOMPLETE BLADDER EMPTYING: ICD-10-CM

## 2023-08-30 DIAGNOSIS — N40.1 BENIGN PROSTATIC HYPERPLASIA WITH INCOMPLETE BLADDER EMPTYING: ICD-10-CM

## 2023-08-30 LAB
ALBUMIN SERPL BCG-MCNC: 4.5 G/DL (ref 3.5–5.2)
ALP SERPL-CCNC: 48 U/L (ref 40–129)
ALT SERPL W P-5'-P-CCNC: 47 U/L (ref 0–70)
ANION GAP SERPL CALCULATED.3IONS-SCNC: 15 MMOL/L (ref 7–15)
AST SERPL W P-5'-P-CCNC: 40 U/L (ref 0–45)
BILIRUB SERPL-MCNC: 0.5 MG/DL
BUN SERPL-MCNC: 19.7 MG/DL (ref 6–20)
CALCIUM SERPL-MCNC: 9.6 MG/DL (ref 8.6–10)
CHLORIDE SERPL-SCNC: 103 MMOL/L (ref 98–107)
CHOLEST SERPL-MCNC: 180 MG/DL
CREAT SERPL-MCNC: 0.87 MG/DL (ref 0.67–1.17)
DEPRECATED HCO3 PLAS-SCNC: 21 MMOL/L (ref 22–29)
GFR SERPL CREATININE-BSD FRML MDRD: >90 ML/MIN/1.73M2
GLUCOSE SERPL-MCNC: 110 MG/DL (ref 70–99)
HDLC SERPL-MCNC: 80 MG/DL
LDLC SERPL CALC-MCNC: 71 MG/DL
NONHDLC SERPL-MCNC: 100 MG/DL
POTASSIUM SERPL-SCNC: 4.3 MMOL/L (ref 3.4–5.3)
PROT SERPL-MCNC: 7.2 G/DL (ref 6.4–8.3)
PSA SERPL DL<=0.01 NG/ML-MCNC: 0.61 NG/ML (ref 0–3.5)
SODIUM SERPL-SCNC: 139 MMOL/L (ref 136–145)
TRIGL SERPL-MCNC: 147 MG/DL

## 2023-08-30 PROCEDURE — 36415 COLL VENOUS BLD VENIPUNCTURE: CPT

## 2023-08-30 PROCEDURE — G0103 PSA SCREENING: HCPCS

## 2023-08-30 PROCEDURE — 80053 COMPREHEN METABOLIC PANEL: CPT

## 2023-08-30 PROCEDURE — 80061 LIPID PANEL: CPT

## 2023-09-15 NOTE — TELEPHONE ENCOUNTER
RECORDS RECEIVED FROM: internal   REASON FOR VISIT: CVA   Date of Appt: 11/8/23   NOTES (FOR ALL VISITS) STATUS DETAILS   OFFICE NOTE from referring provider Internal Marilu REED @ [facility]:  8/28/23   OFFICE NOTE from other specialist Internal/CE Dr Keith Simon @ [facility]:  2/23/23    Mariia Daly NP @ [facility]:  1/11/23   DISCHARGE SUMMARY from hospital Care Everywhere [facility]:  12/6/22-12/7/22   MEDICATION LIST Internal    IMAGING  (FOR ALL VISITS)     MRI (HEAD, NECK, SPINE) PACS [facility]:  MRA Head Neck 12/6/22    [facility]:  MRA Brain COW 1/7/21  MRI Brain 1/7/21   CT (HEAD, NECK, SPINE) PACS [facility]:  CTA Head Neck 12/6/22  CT Head Stroke 12/6/22

## 2023-10-18 DIAGNOSIS — N40.1 BENIGN PROSTATIC HYPERPLASIA WITH INCOMPLETE BLADDER EMPTYING: ICD-10-CM

## 2023-10-18 DIAGNOSIS — R39.14 BENIGN PROSTATIC HYPERPLASIA WITH INCOMPLETE BLADDER EMPTYING: ICD-10-CM

## 2023-10-18 RX ORDER — TAMSULOSIN HYDROCHLORIDE 0.4 MG/1
CAPSULE ORAL
Qty: 90 CAPSULE | Refills: 1 | Status: SHIPPED | OUTPATIENT
Start: 2023-10-18 | End: 2024-05-10

## 2023-10-24 ENCOUNTER — TRANSFERRED RECORDS (OUTPATIENT)
Dept: HEALTH INFORMATION MANAGEMENT | Facility: CLINIC | Age: 58
End: 2023-10-24
Payer: COMMERCIAL

## 2023-10-28 ENCOUNTER — HEALTH MAINTENANCE LETTER (OUTPATIENT)
Age: 58
End: 2023-10-28

## 2023-11-03 DIAGNOSIS — F41.9 ANXIETY: ICD-10-CM

## 2023-11-03 RX ORDER — BUPROPION HYDROCHLORIDE 150 MG/1
150 TABLET ORAL EVERY MORNING
Qty: 30 TABLET | Refills: 4 | Status: SHIPPED | OUTPATIENT
Start: 2023-11-03 | End: 2023-11-14

## 2023-11-08 ENCOUNTER — PRE VISIT (OUTPATIENT)
Dept: NEUROLOGY | Facility: CLINIC | Age: 58
End: 2023-11-08

## 2023-11-08 ENCOUNTER — TELEPHONE (OUTPATIENT)
Dept: NEUROLOGY | Facility: CLINIC | Age: 58
End: 2023-11-08

## 2023-11-08 NOTE — TELEPHONE ENCOUNTER
Left Voicemail (1st Attempt) and Sent Mychart (1st Attempt) for the patient to call back and schedule the following:    Appointment type: New Stroke   Provider:    Return date: First Available   Specialty phone number: [phone]  Additional appointment(s) needed: n/a  Additonal Notes: schedule with Dr.Affan PLUNKETT, and sent MyC 11/8 np

## 2023-11-10 ENCOUNTER — TELEPHONE (OUTPATIENT)
Dept: NEUROLOGY | Facility: CLINIC | Age: 58
End: 2023-11-10
Payer: COMMERCIAL

## 2023-11-10 ENCOUNTER — MYC REFILL (OUTPATIENT)
Dept: FAMILY MEDICINE | Facility: CLINIC | Age: 58
End: 2023-11-10
Payer: COMMERCIAL

## 2023-11-10 DIAGNOSIS — F41.9 ANXIETY: ICD-10-CM

## 2023-11-10 RX ORDER — ESCITALOPRAM OXALATE 20 MG/1
20 TABLET ORAL DAILY
Qty: 90 TABLET | Refills: 0 | Status: SHIPPED | OUTPATIENT
Start: 2023-11-10 | End: 2023-11-14

## 2023-11-10 ASSESSMENT — ANXIETY QUESTIONNAIRES
GAD7 TOTAL SCORE: 0
7. FEELING AFRAID AS IF SOMETHING AWFUL MIGHT HAPPEN: NOT AT ALL
3. WORRYING TOO MUCH ABOUT DIFFERENT THINGS: NOT AT ALL
1. FEELING NERVOUS, ANXIOUS, OR ON EDGE: NOT AT ALL
2. NOT BEING ABLE TO STOP OR CONTROL WORRYING: NOT AT ALL
5. BEING SO RESTLESS THAT IT IS HARD TO SIT STILL: NOT AT ALL
4. TROUBLE RELAXING: NOT AT ALL
6. BECOMING EASILY ANNOYED OR IRRITABLE: NOT AT ALL
GAD7 TOTAL SCORE: 0

## 2023-11-10 NOTE — TELEPHONE ENCOUNTER
Left Voicemail (2nd Attempt) and Sent Mychart (2nd Attempt) for the patient to call back and schedule the following:    Appointment type: New Stroke   Provider:    Return date: First Available   Specialty phone number: [phone]  Additional appointment(s) needed: N/A  Additonal Notes: LVM x2 and sent MyC x2    Deepthi Ohara on 11/10/2023 at 8:12 AM

## 2023-11-14 ENCOUNTER — OFFICE VISIT (OUTPATIENT)
Dept: FAMILY MEDICINE | Facility: CLINIC | Age: 58
End: 2023-11-14
Payer: COMMERCIAL

## 2023-11-14 VITALS
BODY MASS INDEX: 29.81 KG/M2 | SYSTOLIC BLOOD PRESSURE: 130 MMHG | TEMPERATURE: 98.5 F | DIASTOLIC BLOOD PRESSURE: 82 MMHG | WEIGHT: 212.9 LBS | HEART RATE: 73 BPM | OXYGEN SATURATION: 98 % | HEIGHT: 71 IN | RESPIRATION RATE: 12 BRPM

## 2023-11-14 DIAGNOSIS — R39.14 BENIGN PROSTATIC HYPERPLASIA WITH INCOMPLETE BLADDER EMPTYING: ICD-10-CM

## 2023-11-14 DIAGNOSIS — S83.206D TEAR OF MENISCUS OF RIGHT KNEE AS CURRENT INJURY, UNSPECIFIED MENISCUS, UNSPECIFIED TEAR TYPE, SUBSEQUENT ENCOUNTER: ICD-10-CM

## 2023-11-14 DIAGNOSIS — N40.1 BENIGN PROSTATIC HYPERPLASIA WITH INCOMPLETE BLADDER EMPTYING: ICD-10-CM

## 2023-11-14 DIAGNOSIS — K21.9 GASTROESOPHAGEAL REFLUX DISEASE WITHOUT ESOPHAGITIS: ICD-10-CM

## 2023-11-14 DIAGNOSIS — F41.9 ANXIETY: ICD-10-CM

## 2023-11-14 DIAGNOSIS — I63.9 CEREBROVASCULAR ACCIDENT (CVA), UNSPECIFIED MECHANISM (H): ICD-10-CM

## 2023-11-14 DIAGNOSIS — Z01.818 PREOP GENERAL PHYSICAL EXAM: Primary | ICD-10-CM

## 2023-11-14 DIAGNOSIS — G47.33 OSA (OBSTRUCTIVE SLEEP APNEA): ICD-10-CM

## 2023-11-14 DIAGNOSIS — E78.5 HYPERLIPIDEMIA LDL GOAL <130: ICD-10-CM

## 2023-11-14 LAB — HGB BLD-MCNC: 16 G/DL (ref 13.3–17.7)

## 2023-11-14 PROCEDURE — 85018 HEMOGLOBIN: CPT | Performed by: PHYSICIAN ASSISTANT

## 2023-11-14 PROCEDURE — 93000 ELECTROCARDIOGRAM COMPLETE: CPT | Performed by: PHYSICIAN ASSISTANT

## 2023-11-14 PROCEDURE — 36415 COLL VENOUS BLD VENIPUNCTURE: CPT | Performed by: PHYSICIAN ASSISTANT

## 2023-11-14 PROCEDURE — 99214 OFFICE O/P EST MOD 30 MIN: CPT | Mod: 25 | Performed by: PHYSICIAN ASSISTANT

## 2023-11-14 RX ORDER — BUPROPION HYDROCHLORIDE 150 MG/1
150 TABLET ORAL EVERY MORNING
Qty: 90 TABLET | Refills: 1 | Status: SHIPPED | OUTPATIENT
Start: 2023-11-14 | End: 2024-05-15

## 2023-11-14 RX ORDER — ESCITALOPRAM OXALATE 20 MG/1
20 TABLET ORAL DAILY
Qty: 90 TABLET | Refills: 1 | Status: SHIPPED | OUTPATIENT
Start: 2023-11-14 | End: 2024-06-19

## 2023-11-14 ASSESSMENT — PAIN SCALES - GENERAL: PAINLEVEL: NO PAIN (0)

## 2023-11-14 NOTE — PATIENT INSTRUCTIONS
Preparing for Your Surgery  Getting started  A nurse will call you to review your health history and instructions. They will give you an arrival time based on your scheduled surgery time. Please be ready to share:  Your doctor's clinic name and phone number  Your medical, surgical, and anesthesia history  A list of allergies and sensitivities  A list of medicines, including herbal treatments and over-the-counter drugs  Whether the patient has a legal guardian (ask how to send us the papers in advance)  Please tell us if you're pregnant--or if there's any chance you might be pregnant. Some surgeries may injure a fetus (unborn baby), so they require a pregnancy test. Surgeries that are safe for a fetus don't always need a test, and you can choose whether to have one.   If you have a child who's having surgery, please ask for a copy of Preparing for Your Child's Surgery.    Preparing for surgery  Within 10 to 30 days of surgery: Have a pre-op exam (sometimes called an H&P, or History and Physical). This can be done at a clinic or pre-operative center.  If you're having a , you may not need this exam. Talk to your care team.  At your pre-op exam, talk to your care team about all medicines you take. If you need to stop any medicines before surgery, ask when to start taking them again.  We do this for your safety. Many medicines can make you bleed too much during surgery. Some change how well surgery (anesthesia) drugs work.  Call your insurance company to let them know you're having surgery. (If you don't have insurance, call [phone].)  Call your clinic if there's any change in your health. This includes signs of a cold or flu (sore throat, runny nose, cough, rash, fever). It also includes a scrape or scratch near the surgery site.  If you have questions on the day of surgery, call your hospital or surgery center.  Eating and drinking guidelines  For your safety: Unless your surgeon tells you otherwise,  follow the guidelines below.  Eat and drink as usual until 8 hours before you arrive for surgery. After that, no food or milk.  Drink clear liquids until 2 hours before you arrive. These are liquids you can see through, like water, Gatorade, and Propel Water. They also include plain black coffee and tea (no cream or milk), candy, and breath mints. You can spit out gum when you arrive.  If you drink alcohol: Stop drinking it the night before surgery.  If your care team tells you to take medicine on the morning of surgery, it's okay to take it with a sip of water.  Preventing infection  Shower or bathe the night before and morning of your surgery. Follow the instructions your clinic gave you. (If no instructions, use regular soap.)  Don't shave or clip hair near your surgery site. We'll remove the hair if needed.  Don't smoke or vape the morning of surgery. You may chew nicotine gum up to 2 hours before surgery. A nicotine patch is okay.  Note: Some surgeries require you to completely quit smoking and nicotine. Check with your surgeon.  Your care team will make every effort to keep you safe from infection. We will:  Clean our hands often with soap and water (or an alcohol-based hand rub).  Clean the skin at your surgery site with a special soap that kills germs.  Give you a special gown to keep you warm. (Cold raises the risk of infection.)  Wear special hair covers, masks, gowns and gloves during surgery.  Give antibiotic medicine, if prescribed. Not all surgeries need antibiotics.  What to bring on the day of surgery  Photo ID and insurance card  Copy of your health care directive, if you have one  Glasses and hearing aids (bring cases)  You can't wear contacts during surgery  Inhaler and eye drops, if you use them (tell us about these when you arrive)  CPAP machine or breathing device, if you use them  A few personal items, if spending the night  If you have . . .  A pacemaker, ICD (cardiac defibrillator) or other  implant: Bring the ID card.  An implanted stimulator: Bring the remote control.  A legal guardian: Bring a copy of the certified (court-stamped) guardianship papers.  Please remove any jewelry, including body piercings. Leave jewelry and other valuables at home.  If you're going home the day of surgery  You must have a responsible adult drive you home. They should stay with you overnight as well.  If you don't have someone to stay with you, and you aren't safe to go home alone, we may keep you overnight. Insurance often won't pay for this.  After surgery  If it's hard to control your pain or you need more pain medicine, please call your surgeon's office.  Questions?   If you have any questions for your care team, list them here: _________________________________________________________________________________________________________________________________________________________________________ ____________________________________ ____________________________________ ____________________________________  For informational purposes only. Not to replace the advice of your health care provider. Copyright   2003, 2019 [email or web address]. All rights reserved. Clinically reviewed by Haylie Ball MD. [employer] 968993 - REV 12/22.    How to Take Your Medication Before Surgery  - HOLD (do not take) Aspirin for 7 days  - STOP taking all vitamins and herbal supplements 14 days before surgery.  He has already stopped his ASA 4 days ago,

## 2023-11-14 NOTE — PROGRESS NOTES
[facility] [place]  [address], [address]  [place] [address]  Phone: [phone]  Fax: [phone]  Primary Provider: Ashley Gomez  Pre-op Performing Provider: ASHLEY GOMEZ      PREOPERATIVE EVALUATION:  Today's date: 11/14/2023    Rebel is a 58 year old male who presents for a preoperative evaluation.      11/14/2023     7:32 AM   Additional Questions   Roomed by Chiara CANTU   Accompanied by None         11/14/2023     7:32 AM   Patient Reported Additional Medications   Patient reports taking the following new medications NA       Surgical Information:  Surgery/Procedure: MENISCUS REPAIR AND LOOSE BODY REMOVAL   Surgery Location: [facility]   Surgeon: DR. PÉREZ   Surgery Date: 11/16/23  Time of Surgery: 1:00   Where patient plans to recover: At home with family  Fax number for surgical facility: [phone]    Assessment & Plan     The proposed surgical procedure is considered INTERMEDIATE risk.    Preop general physical exam  Patient is optimized for procedure as above  - Hemoglobin; Future  - Hemoglobin    Tear of meniscus of right knee as current injury, unspecified meniscus, unspecified tear type, subsequent encounter  He is scheduled for procedure as above    Cerebrovascular accident (CVA), unspecified mechanism (H)  Status post CVA December 6, 2022 he is in the process of reestablishing care with neurology he has had no neurological symptoms.  He is taking his rosuvastatin and aspirin as recommended although he has been off his aspirin for the last 4 days.  He is aware he is at increased risk for stroke during this time.  He also needs to quit smoking and using nicotine products  - EKG 12-lead complete w/read - Clinics    GLENROY (obstructive sleep apnea)  Well treated with his CPAP, he will bring his CPAP to his procedure in the event he needs to be admitted    Hyperlipidemia LDL goal <130  Labs are up-to-date, continue current dosage of rosuvastatin  - EKG 12-lead  complete w/read - Clinics    Anxiety  Well-controlled, continue current meds  - escitalopram (LEXAPRO) 20 MG tablet; Take 1 tablet (20 mg) by mouth daily  - buPROPion (WELLBUTRIN XL) 150 MG 24 hr tablet; Take 1 tablet (150 mg) by mouth every morning    Benign prostatic hyperplasia with incomplete bladder emptying  Following with urology, no signs or symptoms of urinary tract infections today history of reflux and    Gastroesophageal reflux disease without esophagitis  History of gastric ulcer, not currently doing any treatment for this, use NSAIDS sparingly           Risks and Recommendations:  The patient has the following additional risks and recommendations for perioperative complications:  Cardiovascular:   - History of TIA in 2007 and CVA on 12-6-2023  Pulmonary:    - Incentive spirometry post-op   - Active nicotine user, advised smoking cessation  Obstructive Sleep Apnea:   Continue to use CPAP as prescribed    Antiplatelet or Anticoagulation Medication Instructions:   - aspirin: Discontinue aspirin 7-10 days prior to procedure to reduce bleeding risk. It should be resumed postoperatively.     Additional Medication Instructions:  Patient is to take all scheduled medications on the day of surgery    RECOMMENDATION:  APPROVAL GIVEN to proceed with proposed procedure, without further diagnostic evaluation.      Subjective       HPI related to upcoming procedure: Possible had a fall at work in September.  He fractured his patella and tore his right meniscus.  His patella fracture has healed per patient and now he is presenting for a preop in order to repair meniscal injury.        11/7/2023     9:42 AM   Preop Questions   1. Have you ever had a heart attack or stroke? YES -he reports a TIA in 2007 and then a CVA December 6, 2022.  He has not had any new or worsening neurologic symptoms.  He does have some memory difficulty since his stroke.  He plans to reschedule with neurology he had to cancel his appointment  due to a conflicting appointment with his work comp injury.  He feels he is doing well.  He understands going off his aspirin puts him at increased risk for a repeated CVA.  He has never had a heart attack.   2. Have you ever had surgery on your heart or blood vessels, such as a stent placement, a coronary artery bypass, or surgery on an artery in your head, neck, heart, or legs? No   3. Do you have chest pain with activity? No   4. Do you have a history of  heart failure? No   5. Do you currently have a cold, bronchitis or symptoms of other infection? No   6. Do you have a cough, shortness of breath, or wheezing? No   7. Do you or anyone in your family have previous history of blood clots? YES - in 2007 had a TIA, but no PMH of blood clots and he is unaware of family history of blood clots   8. Do you or does anyone in your family have a serious bleeding problem such as prolonged bleeding following surgeries or cuts? No   9. Have you ever had problems with anemia or been told to take iron pills? No   10. Have you had any abnormal blood loss such as black, tarry or bloody stools? No   11. Have you ever had a blood transfusion? No   12. Are you willing to have a blood transfusion if it is medically needed before, during, or after your surgery? Yes   13. Have you or any of your relatives ever had problems with anesthesia? No   14. Do you have sleep apnea, excessive snoring or daytime drowsiness? YES - sleep apnea   14a. Do you have a CPAP machine? Yes- improves his sleep   15. Do you have any artifical heart valves or other implanted medical devices like a pacemaker, defibrillator, or continuous glucose monitor? No   16. Do you have artificial joints? No   17. Are you allergic to latex? No       Health Care Directive:  Patient does not have a Health Care Directive or Living Will: Discussed advance care planning with patient; however, patient declined at this time.    Preoperative Review of :   reviewed - no  record of controlled substances prescribed.      Status of Chronic Conditions:  See problem list for active medical problems.  Problems all longstanding and stable, except as noted/documented.  See ROS for pertinent symptoms related to these conditions.    HYPERLIPIDEMIA - Patient has a long history of significant Hyperlipidemia requiring medication for treatment with recent good control. Patient reports no problems or side effects with the medication.     SLEEP PROBLEM - Patient has a longstanding history of sleep apnea.  Which he treats with his CPAP machine    History of CVA as above, currently without progressive or new symptoms.  He is using his aspirin and hyperlipidemic medication but is not currently treating his blood pressure.  Thankfully his blood pressures have been normal    Review of Systems  CONSTITUTIONAL: NEGATIVE for fever, chills, change in weight  INTEGUMENTARY/SKIN: NEGATIVE for worrisome rashes, moles or lesions  EYES: NEGATIVE for vision changes or irritation  ENT/MOUTH: NEGATIVE for ear, mouth and throat problems  RESP: NEGATIVE for significant cough or SOB  CV: NEGATIVE for chest pain, palpitations or peripheral edema  GI: NEGATIVE for nausea, abdominal pain, heartburn, or change in bowel habits   male : Positive for intermittent urinary hesitancy, this is more progressive or worsening and he does follow with urology though is in need of a follow-up  MUSCULOSKELETAL:POSITIVE  for right knee pain  NEURO: NEGATIVE for weakness, dizziness or paresthesias  ENDOCRINE: NEGATIVE for temperature intolerance, skin/hair changes  HEME: NEGATIVE for bleeding problems  PSYCHIATRIC: NEGATIVE for changes in mood or affect    Patient Active Problem List    Diagnosis Date Noted    GLENROY (obstructive sleep apnea) 08/28/2023     Priority: Medium    Gastric ulcer without hemorrhage or perforation, unspecified chronicity 10/12/2020     Priority: Medium    Hyperlipidemia LDL goal <130 12/06/2018     Priority:  Medium    Gastroesophageal reflux disease without esophagitis 12/06/2018     Priority: Medium    Attention deficit hyperactivity disorder (ADHD) 05/01/2014     Priority: Medium     Problem list name updated by automated process. Provider to review      Family history of colon cancer 09/19/2011     Priority: Medium    Family history of prostate cancer 09/19/2011     Priority: Medium    Anxiety 07/09/2009     Priority: Medium    Encounter for sterilization 02/01/2008     Priority: Medium     Problem list name updated by automated process. Provider to review      Encounter for other general counseling or advice on contraception 11/19/2007     Priority: Medium     Diagnosis updated by automated process. Provider to review and confirm.      Pain in joint, lower leg 09/17/2001     Priority: Medium      Past Medical History:   Diagnosis Date    Cerebral infarction (H) dec 22    Gastroesophageal reflux disease without esophagitis 12/06/2018    Hyperlipidemia LDL goal <130 12/06/2018    Migraine, unspecified, without mention of intractable migraine without mention of status migrainosus     Orthostatic syncope     Sleep apnea      Past Surgical History:   Procedure Laterality Date    COLONOSCOPY  2/25/2000    COLONOSCOPY  02/16/10    COLONOSCOPY N/A 7/17/2015    Procedure: COMBINED COLONOSCOPY, SINGLE OR MULTIPLE BIOPSY/POLYPECTOMY BY BIOPSY;  Surgeon: Dima Sosa MD;  Location:  GI    COLONOSCOPY N/A 2/14/2023    Procedure: COLONOSCOPY, WITH POLYPECTOMY AND BIOPSY;  Surgeon: Delroy Myers MD;  Location:  GI    HC CORRECT KIM BORREGO/DARNELL/JILLIAN  05/04/2005    Modified Darnell bunionectomy, right foot.    HERNIA REPAIR, INCISIONAL  08/25/2006    Incarcerated recurrent ventral hernia.    HERNIORRHAPHY UMBILICAL  3/29/2011    HERNIORRHAPHY UMBILICAL performed by ALOK WESLEY at  OR    [facility] REPAIR UMBILICAL HARDY,5+Y/O, INCARCERATED OR STRANGULATED  6/15/2004     Current Outpatient Medications  "  Medication Sig Dispense Refill    buPROPion (WELLBUTRIN XL) 150 MG 24 hr tablet Take 1 tablet (150 mg) by mouth every morning 90 tablet 1    escitalopram (LEXAPRO) 20 MG tablet Take 1 tablet (20 mg) by mouth daily 90 tablet 1    rosuvastatin (CRESTOR) 10 MG tablet Take 1 tablet (10 mg) by mouth daily 90 tablet 1    tamsulosin (FLOMAX) 0.4 MG capsule TAKE 1 CAPSULE(0.4 MG) BY MOUTH DAILY 90 capsule 1    ASPIRIN LOW DOSE 81 MG EC tablet  (Patient not taking: Reported on 2023)         Allergies   Allergen Reactions    No Known Allergies         Social History     Tobacco Use    Smoking status: Some Days     Packs/day: 0.00     Years: 18.00     Additional pack years: 0.00     Total pack years: 0.00     Types: Cigarettes, Dip, chew, snus or snuff     Last attempt to quit: 2006     Years since quittin.7    Smokeless tobacco: Current     Last attempt to quit: 2023   Substance Use Topics    Alcohol use: Yes     Comment: occ       History   Drug Use No         Objective     /82 (BP Location: Left arm, Patient Position: Chair, Cuff Size: Adult Regular)   Pulse 73   Temp 98.5  F (36.9  C) (Temporal)   Resp 12   Ht 1.81 m (5' 11.26\")   Wt 96.6 kg (212 lb 14.4 oz)   SpO2 98%   BMI 29.48 kg/m      Physical Exam    GENERAL APPEARANCE: healthy, alert and no distress     EYES: EOMI,  PERRL     HENT: ear canals and TM's normal and nose and mouth without ulcers or lesions     NECK: no adenopathy, no asymmetry, masses, or scars and thyroid normal to palpation     RESP: lungs clear to auscultation - no rales, rhonchi or wheezes     CV: regular rates and rhythm, normal S1 S2, no S3 or S4 and no murmur, click or rub     ABDOMEN:  soft, nontender, no HSM or masses and bowel sounds normal     MS: extremities normal- no gross deformities noted, no evidence of inflammation in joints, FROM in all extremities.     MS: walks favoring his right leg, avoids bending knee     SKIN: no suspicious lesions or " rashes     NEURO: Normal strength and tone, sensory exam grossly normal, mentation intact and speech normal     PSYCH: mentation appears normal. and affect normal/bright     LYMPHATICS: No cervical adenopathy    Recent Labs   Lab Test 08/30/23  0947 08/01/22  0902   HGB  --  16.1   PLT  --  204    141   POTASSIUM 4.3 4.7   CR 0.87 1.01        Diagnostics:  Recent Results (from the past 24 hour(s))   Hemoglobin    Collection Time: 11/14/23  8:25 AM   Result Value Ref Range    Hemoglobin 16.0 13.3 - 17.7 g/dL      EKG: appears normal, NSR, normal axis, normal intervals, no acute ST/T changes c/w ischemia, no LVH by voltage criteria, unchanged from previous tracings    Revised Cardiac Risk Index (RCRI):  The patient has the following serious cardiovascular risks for perioperative complications:   - No serious cardiac risks = 1 points  due to history of CVA    RCRI Interpretation: 1 point: Class II (low risk - 0.9% complication rate)         Signed Electronically by: Marilu Gomez PA-C  Copy of this evaluation report is provided to requesting physician.

## 2023-11-16 ENCOUNTER — TRANSFERRED RECORDS (OUTPATIENT)
Dept: HEALTH INFORMATION MANAGEMENT | Facility: CLINIC | Age: 58
End: 2023-11-16
Payer: COMMERCIAL

## 2023-12-05 ENCOUNTER — TRANSFERRED RECORDS (OUTPATIENT)
Dept: HEALTH INFORMATION MANAGEMENT | Facility: CLINIC | Age: 58
End: 2023-12-05
Payer: COMMERCIAL

## 2023-12-06 ENCOUNTER — PATIENT OUTREACH (OUTPATIENT)
Dept: GASTROENTEROLOGY | Facility: CLINIC | Age: 58
End: 2023-12-06
Payer: COMMERCIAL

## 2024-01-02 ENCOUNTER — TRANSFERRED RECORDS (OUTPATIENT)
Dept: HEALTH INFORMATION MANAGEMENT | Facility: CLINIC | Age: 59
End: 2024-01-02
Payer: COMMERCIAL

## 2024-01-08 ENCOUNTER — TELEPHONE (OUTPATIENT)
Dept: NEUROLOGY | Facility: CLINIC | Age: 59
End: 2024-01-08
Payer: COMMERCIAL

## 2024-01-08 NOTE — TELEPHONE ENCOUNTER
Left Voicemail (1st Attempt) and Sent Mychart (1st Attempt) for the patient to call back and schedule the following:    Appointment type: New stroke   Provider:    Return date: First available   Specialty phone number: [phone]  Additional appointment(s) needed: N/A  Additonal Notes: LVM, and sent MyC

## 2024-01-10 ENCOUNTER — TELEPHONE (OUTPATIENT)
Dept: NEUROLOGY | Facility: CLINIC | Age: 59
End: 2024-01-10
Payer: COMMERCIAL

## 2024-01-10 NOTE — TELEPHONE ENCOUNTER
Left Voicemail (2nd Attempt) for the patient to call back and schedule the following:    Appointment type: New stroke   Provider:    Return date: First available   Specialty phone number: [phone]  Additional appointment(s) needed: N/A  Additonal Notes: 2nd attempt made : LVMx2 , and sent MyC     Deepthi Ohara on 1/10/2024 at 12:33 PM

## 2024-01-29 ENCOUNTER — MYC MEDICAL ADVICE (OUTPATIENT)
Dept: FAMILY MEDICINE | Facility: CLINIC | Age: 59
End: 2024-01-29
Payer: COMMERCIAL

## 2024-01-29 NOTE — TELEPHONE ENCOUNTER
Last seen 11/14/23 for preop    Would a virtual visit be ok to discuss and go through forms or is a in person visit needed

## 2024-01-29 NOTE — TELEPHONE ENCOUNTER
We can do a virtual for this, ok to use blocked spots. I have no idea what he is referring to though as far as what treatment he needs  Marilu Gomez PA-C

## 2024-01-31 ENCOUNTER — VIRTUAL VISIT (OUTPATIENT)
Dept: FAMILY MEDICINE | Facility: CLINIC | Age: 59
End: 2024-01-31
Payer: COMMERCIAL

## 2024-01-31 DIAGNOSIS — F10.10 ALCOHOL ABUSE: Primary | ICD-10-CM

## 2024-01-31 PROCEDURE — 99212 OFFICE O/P EST SF 10 MIN: CPT | Mod: 95 | Performed by: PHYSICIAN ASSISTANT

## 2024-01-31 NOTE — PROGRESS NOTES
"    Instructions Relayed to Patient by Virtual Roomer:     Patient is active on Seriously:   Relayed following to patient: \"It looks like you are active on [facility], are you able to join the visit this way? If not, do you need us to send you a link now or would you like your provider to send a link via text or email when they are ready to initiate the visit?\"    Reminded patient to ensure they were logged on to virtual visit by arrival time listed. Documented in appointment notes if patient had flexibility to initiate visit sooner than arrival time. If pediatric virtual visit, ensured pediatric patient along with parent/guardian will be present for video visit.     Patient offered the website [email or web address] and/or phone number to Seriously Help line: [phone]    Rebel is a 58 year old who is being evaluated via a billable video visit.      How would you like to obtain your AVS? Check [employer] [facility]  If the video visit is dropped, the invitation should be resent by: Send to e-mail at: [email or web address]  Will anyone else be joining your video visit? No      Assessment & Plan     Alcohol abuse  He is awaiting admission at [facility] for 30-45 day admission.  Letter completed per his request, I am anticipating further forms and can complete once I receive them    This chart documentation was completed in part with Dragon voice recognition software.  Documentation is reviewed after completion, however, some words and grammatical errors may remain.  Marilu Gomez PA-C      Subjective   Rebel is a 58 year old, presenting for the following health issues:  Letter Request and Alcohol Problem  - For insurance purposes and JANEY, he needs a letter for inpatient alcohol treatment center.  He reports increased alcohol use after his wife left him 3 years ago.  Recently he has been drinking approximately 4 mixed drinks every night after work.  On the weekends he may drink starting in the morning but he never " does this during the workweek.  He has been talking to his  for the last 3 months and has tried a support group for  alcoholics but states that was not enough.  He contacted the [place] [facility] at the recommendation of his  last week.  On January 26, 2024 he did his assessment they recommended a 30 to 45-day in patient treatment program for him due to the fact that the success rate is high and he has already tried and failed some outpatient support.  He has tried to quit on his own but has been unsuccessful.  He is worried that he will have further strokes if he continues to use alcohol.  His HR department is requesting a letter voicing my support for his inpatient admission for treatment.      1/31/2024     9:35 AM   Additional Questions   Roomed by Jun BARTON   Accompanied by Self     History of Present Illness       Reason for visit:  Letter for IP Treatment                Review of Systems  Constitutional, HEENT, cardiovascular, pulmonary, gi and gu systems are negative, except as otherwise noted.      Objective           Vitals:  No vitals were obtained today due to virtual visit.    Physical Exam   GENERAL: alert and no distress  EYES: Eyes grossly normal to inspection.  No discharge or erythema, or obvious scleral/conjunctival abnormalities.  RESP: No audible wheeze, cough, or visible cyanosis.    SKIN: Visible skin clear. No significant rash, abnormal pigmentation or lesions.  NEURO: Cranial nerves grossly intact.  Mentation and speech appropriate for age.  PSYCH: Appropriate affect, tone, and pace of words          Video-Visit Details    Type of service:  Video Visit     Originating Location (pt. Location): Home    Distant Location (provider location):  On-site  Platform used for Video Visit: Viola  Signed Electronically by: Marilu Gomez PA-C

## 2024-01-31 NOTE — LETTER
January 31, 2024      Rebel Garcia  [id number] [address]        To Whom It May Concern,   Rebel Garcia is currently under my medical care.  In my medical opinion, he needs to attend an inpatient alcohol treatment program for 30- 45 days to aid in alcohol cessation.  It is vital that he stop using alcohol to better manage his chronic health conditions and to prevent further occurences of these conditions.  The date of admission for the treatment program is to be determined but anticipate this treatment to start in the near future.            Sincerely,        Marilu Gomez PA-C

## 2024-02-13 ENCOUNTER — TRANSFERRED RECORDS (OUTPATIENT)
Dept: HEALTH INFORMATION MANAGEMENT | Facility: CLINIC | Age: 59
End: 2024-02-13
Payer: COMMERCIAL

## 2024-02-23 ENCOUNTER — TELEPHONE (OUTPATIENT)
Dept: FAMILY MEDICINE | Facility: CLINIC | Age: 59
End: 2024-02-23
Payer: COMMERCIAL

## 2024-02-23 NOTE — TELEPHONE ENCOUNTER
Forms/Letter Request    Type of form/letter: OTHER: Forms for insurance company regarding treatment plan        Do we have the form/letter: Yes    Who is the form from? Patient    Where did/will the form come from? Patient or family brought in       When is form/letter needed by: ASAP     How would you like the form/letter returned:     Patient Notified form requests are processed in 5-7 business days:Yes    Could we send this information to you in [employer] or would you prefer to receive a phone call?:   No preference   Okay to leave a detailed message?: Yes at Cell number on file:    Telephone Information:   Mobile [phone]

## 2024-02-26 ENCOUNTER — MYC MEDICAL ADVICE (OUTPATIENT)
Dept: FAMILY MEDICINE | Facility: CLINIC | Age: 59
End: 2024-02-26
Payer: COMMERCIAL

## 2024-03-18 ENCOUNTER — TELEPHONE (OUTPATIENT)
Dept: FAMILY MEDICINE | Facility: CLINIC | Age: 59
End: 2024-03-18
Payer: COMMERCIAL

## 2024-03-18 NOTE — TELEPHONE ENCOUNTER
Forms/Letter Request    Type of form/letter: FMLA - Unknown      Do we have the form/letter: Yes:     Who is the form from? Patient    Where did/will the form come from? Patient or family brought in       When is form/letter needed by: 3/21/2024    How would you like the form/letter returned:     Patient Notified form requests are processed in 5-7 business days:Yes    Could we send this information to you in [employer] or would you prefer to receive a phone call?:   Patient would like to be contacted via [employer]

## 2024-03-19 NOTE — TELEPHONE ENCOUNTER
Form completed.  Please fax and close encounter.  Marilu Gomez PA-C      Please copy and scan form

## 2024-03-25 ENCOUNTER — TELEPHONE (OUTPATIENT)
Dept: FAMILY MEDICINE | Facility: CLINIC | Age: 59
End: 2024-03-25
Payer: COMMERCIAL

## 2024-03-25 NOTE — TELEPHONE ENCOUNTER
Forms/Letter Request    Type of form/letter: Disability      Do we have the form/letter: Yes: placed in form bin for TC    Who is the form from? Insurance comp    Where did/will the form come from? form was faxed in    When is form/letter needed by:     How would you like the form/letter returned: Fax : [phone]    Patient Notified form requests are processed in 5-7 business days:No

## 2024-03-26 ENCOUNTER — MYC MEDICAL ADVICE (OUTPATIENT)
Dept: FAMILY MEDICINE | Facility: CLINIC | Age: 59
End: 2024-03-26
Payer: COMMERCIAL

## 2024-03-26 NOTE — TELEPHONE ENCOUNTER
The form wife sent was sent yesterday via fax from insurance company. Sent message back to wife stating we have this and will complete this in the next 5-7 days or sooner per policy.    Gabby Najera CMA ([facility])

## 2024-03-26 NOTE — TELEPHONE ENCOUNTER
Forms/Letter Request    Type of form/letter: Disability  {This patient has been seen within the past 3 months a visit should not be required to complete this form.  :[id number]}    Do we have the form/letter: Yes    Who is the form from? Patient    Where did/will the form come from? Patient or family brought in   sent by [employer]    When is form/letter needed by: by 4/9/2024    How would you like the form/letter returned: Fax : [phone]    Patient Notified form requests are processed in 5-7 business days:Yes    Could we send this information to you in [employer] or would you prefer to receive a phone call?:   Patient would like to be contacted via [employer]    Gabby Najera CMA ([facility])

## 2024-03-26 NOTE — TELEPHONE ENCOUNTER
Faxed, copy sent to scan and placed in fax file.     Gabby Najera CMA ([facility])

## 2024-03-28 ENCOUNTER — PATIENT OUTREACH (OUTPATIENT)
Dept: CARE COORDINATION | Facility: CLINIC | Age: 59
End: 2024-03-28
Payer: COMMERCIAL

## 2024-03-28 NOTE — PROGRESS NOTES
Clinical Product Navigator RN reviewed chart; patient on payer product coverage.  Review results:   CPN Initial Information Gathering  Referral Source: Health Plan    Not met any any referral criteria at this time.  Will monitor for future needs. Patient identified by Health Plan as a potential candidate for Primary Care Coordination. Per chart review, patient is currently admitted to [facility] inpatient program. Patient's girlfriend has been in contact with patient's PCP office. No outreach indicated at this time.     Irais Marquez RN  Clinical Product Navigator  Ph: [phone]

## 2024-03-30 NOTE — MR AVS SNAPSHOT
After Visit Summary   8/6/2018    Rebel Garcia    MRN: [id number]           Patient Information     Date Of Birth          1965        Visit Information        Provider Department      8/6/2018 5:20 PM Benedict Flynn PA-C [facility]        Today's Diagnoses     Folliculitis    -  1       Follow-ups after your visit        Follow-up notes from your care team     Return in about 2 weeks (around 8/20/2018) for recheck of current condition.      Who to contact     If you have questions or need follow up information about today's clinic visit or your schedule please contact [facility] directly at [phone].  Normal or non-critical lab and imaging results will be communicated to you by MyChart, letter or phone within 4 business days after the clinic has received the results. If you do not hear from us within 7 days, please contact the clinic through MyChart or phone. If you have a critical or abnormal lab result, we will notify you by phone as soon as possible.  Submit refill requests through [employer] or call your pharmacy and they will forward the refill request to us. Please allow 3 business days for your refill to be completed.          Additional Information About Your Visit        Care EveryWhere ID     This is your Care EveryWhere ID. This could be used by other organizations to access your [facility] medical records  [id number]        Your Vitals Were     Pulse Temperature Respirations BMI (Body Mass Index)          80 98.7  F (37.1  C) (Temporal) 16 27.31 kg/m2         Blood Pressure from Last 3 Encounters:   08/06/18 108/70   07/30/18 136/80   03/16/18 122/68    Weight from Last 3 Encounters:   08/06/18 198 lb 9.6 oz (90.1 kg)   07/30/18 198 lb 6.4 oz (90 kg)   03/16/18 195 lb 12.8 oz (88.8 kg)              Today, you had the following     No orders found for display         Today's Medication Changes          These changes are accurate as of 8/6/18  5:26  PM.  If you have any questions, ask your nurse or doctor.               Start taking these medicines.        Dose/Directions    sulfamethoxazole-trimethoprim 800-160 MG per tablet   Commonly known as:  BACTRIM DS/SEPTRA DS   Used for:  Folliculitis   Started by:  Benedict Flynn PA-C        Dose:  1 tablet   Take 1 tablet by mouth 2 times daily   Quantity:  20 tablet   Refills:  0         Stop taking these medicines if you haven't already. Please contact your care team if you have questions.     cephALEXin 500 MG capsule   Commonly known as:  KEFLEX   Stopped by:  Benedict Flynn PA-C                Where to get your medicines      These medications were sent to [facility] [place] [address]   [address] [place] [address]     Phone:  [phone]     sulfamethoxazole-trimethoprim 800-160 MG per tablet                Primary Care Provider Office Phone # Fax #    Marilu Gomez PA-C [phone] [address] [place] [address]        Equal Access to Services     [facility] AH: Hadii aad ku hadasho Soomaali, waaxda luqadaha, qaybta kaalmada adeegyada, waxay idiin hayjavedn jonelle cabrales . So [facility] [phone].    ATENCIÓN: Si habla español, tiene a pan disposición servicios gratuitos de asistencia lingüística. Llame al [phone].    We comply with applicable federal civil rights laws and [place] laws. We do not discriminate on the basis of race, color, national origin, age, disability, sex, sexual orientation, or gender identity.            Thank you!     Thank you for choosing [facility]  for your care. Our goal is always to provide you with excellent care. Hearing back from our patients is one way we can continue to improve our services. Please take a few minutes to complete the written survey that you may receive in the mail after your visit with us. Thank you!             Your Updated Medication List - Protect others around  you: Learn how to safely use, store and throw away your medicines at www.disposemymeds.org.          This list is accurate as of 8/6/18  5:26 PM.  Always use your most recent med list.                   Brand Name Dispense Instructions for use Diagnosis    sulfamethoxazole-trimethoprim 800-160 MG per tablet    BACTRIM DS/SEPTRA DS    20 tablet    Take 1 tablet by mouth 2 times daily    Folliculitis          <-- Click to add NO significant Past Surgical History

## 2024-04-30 ENCOUNTER — TRANSFERRED RECORDS (OUTPATIENT)
Dept: HEALTH INFORMATION MANAGEMENT | Facility: CLINIC | Age: 59
End: 2024-04-30
Payer: COMMERCIAL

## 2024-05-10 DIAGNOSIS — I63.9 CEREBROVASCULAR ACCIDENT (CVA), UNSPECIFIED MECHANISM (H): ICD-10-CM

## 2024-05-10 DIAGNOSIS — N40.1 BENIGN PROSTATIC HYPERPLASIA WITH INCOMPLETE BLADDER EMPTYING: ICD-10-CM

## 2024-05-10 DIAGNOSIS — R39.14 BENIGN PROSTATIC HYPERPLASIA WITH INCOMPLETE BLADDER EMPTYING: ICD-10-CM

## 2024-05-10 RX ORDER — TAMSULOSIN HYDROCHLORIDE 0.4 MG/1
CAPSULE ORAL
Qty: 90 CAPSULE | Refills: 0 | Status: SHIPPED | OUTPATIENT
Start: 2024-05-10 | End: 2024-06-25

## 2024-05-10 RX ORDER — ROSUVASTATIN CALCIUM 10 MG/1
10 TABLET, COATED ORAL DAILY
Qty: 90 TABLET | Refills: 0 | Status: SHIPPED | OUTPATIENT
Start: 2024-05-10 | End: 2024-06-25

## 2024-05-15 DIAGNOSIS — F41.9 ANXIETY: ICD-10-CM

## 2024-05-15 RX ORDER — BUPROPION HYDROCHLORIDE 150 MG/1
150 TABLET ORAL EVERY MORNING
Qty: 90 TABLET | Refills: 0 | Status: SHIPPED | OUTPATIENT
Start: 2024-05-15 | End: 2024-06-25

## 2024-06-19 ENCOUNTER — TELEPHONE (OUTPATIENT)
Dept: FAMILY MEDICINE | Facility: CLINIC | Age: 59
End: 2024-06-19
Payer: COMMERCIAL

## 2024-06-19 DIAGNOSIS — F41.9 ANXIETY: ICD-10-CM

## 2024-06-19 RX ORDER — ESCITALOPRAM OXALATE 20 MG/1
20 TABLET ORAL DAILY
Qty: 30 TABLET | Refills: 0 | Status: SHIPPED | OUTPATIENT
Start: 2024-06-19 | End: 2024-06-25

## 2024-06-19 NOTE — TELEPHONE ENCOUNTER
Scheduled patient for an in person appt as he is additionally due for his preventative visit. Closing encounter.     Jun Rhodes

## 2024-06-20 SDOH — HEALTH STABILITY: PHYSICAL HEALTH: ON AVERAGE, HOW MANY DAYS PER WEEK DO YOU ENGAGE IN MODERATE TO STRENUOUS EXERCISE (LIKE A BRISK WALK)?: 3 DAYS

## 2024-06-20 ASSESSMENT — SOCIAL DETERMINANTS OF HEALTH (SDOH): HOW OFTEN DO YOU GET TOGETHER WITH FRIENDS OR RELATIVES?: ONCE A WEEK

## 2024-06-25 ENCOUNTER — OFFICE VISIT (OUTPATIENT)
Dept: FAMILY MEDICINE | Facility: CLINIC | Age: 59
End: 2024-06-25
Payer: COMMERCIAL

## 2024-06-25 VITALS
DIASTOLIC BLOOD PRESSURE: 70 MMHG | WEIGHT: 210 LBS | HEIGHT: 71 IN | RESPIRATION RATE: 18 BRPM | BODY MASS INDEX: 29.4 KG/M2 | TEMPERATURE: 98.2 F | HEART RATE: 82 BPM | SYSTOLIC BLOOD PRESSURE: 130 MMHG | OXYGEN SATURATION: 95 %

## 2024-06-25 DIAGNOSIS — N40.1 BENIGN PROSTATIC HYPERPLASIA WITH INCOMPLETE BLADDER EMPTYING: ICD-10-CM

## 2024-06-25 DIAGNOSIS — F10.91 ALCOHOL USE DISORDER IN REMISSION: ICD-10-CM

## 2024-06-25 DIAGNOSIS — F41.9 ANXIETY: ICD-10-CM

## 2024-06-25 DIAGNOSIS — R39.14 BENIGN PROSTATIC HYPERPLASIA WITH INCOMPLETE BLADDER EMPTYING: ICD-10-CM

## 2024-06-25 DIAGNOSIS — I63.9 CEREBROVASCULAR ACCIDENT (CVA), UNSPECIFIED MECHANISM (H): ICD-10-CM

## 2024-06-25 DIAGNOSIS — R53.83 FATIGUE, UNSPECIFIED TYPE: Primary | ICD-10-CM

## 2024-06-25 DIAGNOSIS — G47.33 OSA (OBSTRUCTIVE SLEEP APNEA): ICD-10-CM

## 2024-06-25 DIAGNOSIS — Z12.5 SCREENING FOR PROSTATE CANCER: ICD-10-CM

## 2024-06-25 PROBLEM — I69.354 HEMIPLEGIA AND HEMIPARESIS FOLLOWING CEREBRAL INFARCTION AFFECTING LEFT NON-DOMINANT SIDE (H): Status: RESOLVED | Noted: 2024-06-25 | Resolved: 2024-06-25

## 2024-06-25 PROBLEM — I69.354 HEMIPLEGIA AND HEMIPARESIS FOLLOWING CEREBRAL INFARCTION AFFECTING LEFT NON-DOMINANT SIDE (H): Status: ACTIVE | Noted: 2024-06-25

## 2024-06-25 PROBLEM — F10.10 ALCOHOL ABUSE: Status: RESOLVED | Noted: 2024-01-31 | Resolved: 2024-06-25

## 2024-06-25 LAB
ERYTHROCYTE [DISTWIDTH] IN BLOOD BY AUTOMATED COUNT: 12.3 % (ref 10–15)
HCT VFR BLD AUTO: 43.3 % (ref 40–53)
HGB BLD-MCNC: 14.9 G/DL (ref 13.3–17.7)
MCH RBC QN AUTO: 29.9 PG (ref 26.5–33)
MCHC RBC AUTO-ENTMCNC: 34.4 G/DL (ref 31.5–36.5)
MCV RBC AUTO: 87 FL (ref 78–100)
PLATELET # BLD AUTO: 216 10E3/UL (ref 150–450)
RBC # BLD AUTO: 4.99 10E6/UL (ref 4.4–5.9)
WBC # BLD AUTO: 5.7 10E3/UL (ref 4–11)

## 2024-06-25 PROCEDURE — 82306 VITAMIN D 25 HYDROXY: CPT | Performed by: PHYSICIAN ASSISTANT

## 2024-06-25 PROCEDURE — 80053 COMPREHEN METABOLIC PANEL: CPT | Performed by: PHYSICIAN ASSISTANT

## 2024-06-25 PROCEDURE — 99214 OFFICE O/P EST MOD 30 MIN: CPT | Performed by: PHYSICIAN ASSISTANT

## 2024-06-25 PROCEDURE — 85027 COMPLETE CBC AUTOMATED: CPT | Performed by: PHYSICIAN ASSISTANT

## 2024-06-25 PROCEDURE — 86618 LYME DISEASE ANTIBODY: CPT | Performed by: PHYSICIAN ASSISTANT

## 2024-06-25 PROCEDURE — 36415 COLL VENOUS BLD VENIPUNCTURE: CPT | Performed by: PHYSICIAN ASSISTANT

## 2024-06-25 PROCEDURE — 84443 ASSAY THYROID STIM HORMONE: CPT | Performed by: PHYSICIAN ASSISTANT

## 2024-06-25 PROCEDURE — G2211 COMPLEX E/M VISIT ADD ON: HCPCS | Performed by: PHYSICIAN ASSISTANT

## 2024-06-25 RX ORDER — TAMSULOSIN HYDROCHLORIDE 0.4 MG/1
CAPSULE ORAL
Qty: 90 CAPSULE | Refills: 0 | Status: SHIPPED | OUTPATIENT
Start: 2024-06-25

## 2024-06-25 RX ORDER — BUPROPION HYDROCHLORIDE 150 MG/1
150 TABLET ORAL EVERY MORNING
Qty: 90 TABLET | Refills: 1 | Status: SHIPPED | OUTPATIENT
Start: 2024-06-25

## 2024-06-25 RX ORDER — ROSUVASTATIN CALCIUM 10 MG/1
10 TABLET, COATED ORAL DAILY
Qty: 90 TABLET | Refills: 3 | Status: SHIPPED | OUTPATIENT
Start: 2024-06-25

## 2024-06-25 RX ORDER — ESCITALOPRAM OXALATE 20 MG/1
20 TABLET ORAL DAILY
Qty: 90 TABLET | Refills: 1 | Status: SHIPPED | OUTPATIENT
Start: 2024-06-25

## 2024-06-25 ASSESSMENT — ANXIETY QUESTIONNAIRES
IF YOU CHECKED OFF ANY PROBLEMS ON THIS QUESTIONNAIRE, HOW DIFFICULT HAVE THESE PROBLEMS MADE IT FOR YOU TO DO YOUR WORK, TAKE CARE OF THINGS AT HOME, OR GET ALONG WITH OTHER PEOPLE: NOT DIFFICULT AT ALL
GAD7 TOTAL SCORE: 0
3. WORRYING TOO MUCH ABOUT DIFFERENT THINGS: NOT AT ALL
4. TROUBLE RELAXING: NOT AT ALL
GAD7 TOTAL SCORE: 0
5. BEING SO RESTLESS THAT IT IS HARD TO SIT STILL: NOT AT ALL
8. IF YOU CHECKED OFF ANY PROBLEMS, HOW DIFFICULT HAVE THESE MADE IT FOR YOU TO DO YOUR WORK, TAKE CARE OF THINGS AT HOME, OR GET ALONG WITH OTHER PEOPLE?: NOT DIFFICULT AT ALL
7. FEELING AFRAID AS IF SOMETHING AWFUL MIGHT HAPPEN: NOT AT ALL
GAD7 TOTAL SCORE: 0
7. FEELING AFRAID AS IF SOMETHING AWFUL MIGHT HAPPEN: NOT AT ALL
2. NOT BEING ABLE TO STOP OR CONTROL WORRYING: NOT AT ALL
1. FEELING NERVOUS, ANXIOUS, OR ON EDGE: NOT AT ALL
6. BECOMING EASILY ANNOYED OR IRRITABLE: NOT AT ALL

## 2024-06-25 ASSESSMENT — PATIENT HEALTH QUESTIONNAIRE - PHQ9
10. IF YOU CHECKED OFF ANY PROBLEMS, HOW DIFFICULT HAVE THESE PROBLEMS MADE IT FOR YOU TO DO YOUR WORK, TAKE CARE OF THINGS AT HOME, OR GET ALONG WITH OTHER PEOPLE: NOT DIFFICULT AT ALL
SUM OF ALL RESPONSES TO PHQ QUESTIONS 1-9: 1
SUM OF ALL RESPONSES TO PHQ QUESTIONS 1-9: 1

## 2024-06-25 NOTE — PROGRESS NOTES
"  Assessment & Plan     Fatigue, unspecified type  Unknown etiology, multiple factors possible--  - Vitamin D Deficiency; Future  - Lyme Disease Total Abs Bld with Reflex to Confirm CLIA; Future  - Comprehensive metabolic panel (BMP + Alb, Alk Phos, ALT, AST, Total. Bili, TP); Future  - CBC with platelets; Future  - TSH with free T4 reflex; Future  - Vitamin D Deficiency  - Lyme Disease Total Abs Bld with Reflex to Confirm CLIA  - Comprehensive metabolic panel (BMP + Alb, Alk Phos, ALT, AST, Total. Bili, TP)  - CBC with platelets  - TSH with free T4 reflex  Sleep apnea is also a consideration I will place order for sleep med recheck also    Anxiety  Stable, continue current meds, if fatigue persists could consider reducing dosage of lexapro as needed  - buPROPion (WELLBUTRIN XL) 150 MG 24 hr tablet; Take 1 tablet (150 mg) by mouth every morning  - escitalopram (LEXAPRO) 20 MG tablet; Take 1 tablet (20 mg) by mouth daily    Cerebrovascular accident (CVA), unspecified mechanism (H)  Will be due for lipids in August, no sequlae noted   - rosuvastatin (CRESTOR) 10 MG tablet; Take 1 tablet (10 mg) by mouth daily    Benign prostatic hyperplasia with incomplete bladder emptying  Well controlled, continue current meds, will do PSA in August also  - tamsulosin (FLOMAX) 0.4 MG capsule; TAKE 1 CAPSULE(0.4 MG) BY MOUTH DAILY            BMI  Estimated body mass index is 29.29 kg/m  as calculated from the following:    Height as of this encounter: 1.803 m (5' 11\").    Weight as of this encounter: 95.3 kg (210 lb).   Weight management plan: Discussed healthy diet and exercise guidelines    Counseling  Appropriate preventive services were discussed with this patient, including applicable screening as appropriate for fall prevention, nutrition, physical activity, Tobacco-use cessation, weight loss and cognition.  Checklist reviewing preventive services available has been given to the patient.  Reviewed patient's diet, addressing " "concerns and/or questions.   He is at risk for lack of exercise and has been provided with information to increase physical activity for the benefit of his well-being.     The longitudinal plan of care for the diagnosis(es)/condition(s) as documented were addressed during this visit. Due to the added complexity in care, I will continue to support Rebel in the subsequent management and with ongoing continuity of care.       Subjective   Rebel is a 58 year old, presenting for the following health issues:  Medication Follow-up        6/25/2024    11:18 AM   Additional Questions   Roomed by BT   Accompanied by self     History of Present Illness       Mental Health Follow-up:  Patient presents to follow-up on Anxiety.    Patient's anxiety since last visit has been:  Good  The patient is not having other symptoms associated with anxiety.  Any significant life events: relationship concerns  Patient is not feeling anxious or having panic attacks.  Patient has no concerns about alcohol or drug use.      He reports he has done well since his discharge from inpatient alcohol treatment.  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  Mentally he feels so much better, he would like to continue with current dosages at this time.    He is not having any issues with use of his rosuvastatin, he is taking this med after having a CVA.  He has done well and has no sequelae.  No new symptoms.    The flomax is effective for his enlarged prostate.  No side effects or issues with its use      Review of Systems  Constitutional, HEENT, cardiovascular, pulmonary, gi and gu systems are negative, except as otherwise noted.      Objective    /70   Pulse 82   Temp 98.2  F (36.8  C) (Temporal)   Resp 18   Ht 1.803 m (5' 11\")   Wt 95.3 kg (210 lb)   SpO2 95%   BMI 29.29 kg/m    Body mass index is 29.29 kg/m .  Physical Exam   GENERAL: alert and no distress  NECK: no adenopathy, no " asymmetry, masses, or scars  RESP: lungs clear to auscultation - no rales, rhonchi or wheezes  CV: regular rate and rhythm, normal S1 S2, no S3 or S4, no murmur, click or rub, no peripheral edema  ABDOMEN: soft, nontender, no hepatosplenomegaly, no masses and bowel sounds normal  MS: no gross musculoskeletal defects noted, no edema  PSYCH: mentation appears normal, affect normal/bright    Office Visit on 11/14/2023   Component Date Value Ref Range Status    Hemoglobin 11/14/2023 16.0  13.3 - 17.7 g/dL Final     Results for orders placed or performed in visit on 06/25/24   CBC with platelets     Status: Normal   Result Value Ref Range    WBC Count 5.7 4.0 - 11.0 10e3/uL    RBC Count 4.99 4.40 - 5.90 10e6/uL    Hemoglobin 14.9 13.3 - 17.7 g/dL    Hematocrit 43.3 40.0 - 53.0 %    MCV 87 78 - 100 fL    MCH 29.9 26.5 - 33.0 pg    MCHC 34.4 31.5 - 36.5 g/dL    RDW 12.3 10.0 - 15.0 %    Platelet Count 216 150 - 450 10e3/uL           Signed Electronically by: Marilu Gomez PA-C

## 2024-06-26 LAB
ALBUMIN SERPL BCG-MCNC: 4.5 G/DL (ref 3.5–5.2)
ALP SERPL-CCNC: 55 U/L (ref 40–150)
ALT SERPL W P-5'-P-CCNC: 33 U/L (ref 0–70)
ANION GAP SERPL CALCULATED.3IONS-SCNC: 11 MMOL/L (ref 7–15)
AST SERPL W P-5'-P-CCNC: 31 U/L (ref 0–45)
B BURGDOR IGG+IGM SER QL: 0.07
BILIRUB SERPL-MCNC: 0.6 MG/DL
BUN SERPL-MCNC: 19.3 MG/DL (ref 6–20)
CALCIUM SERPL-MCNC: 9.7 MG/DL (ref 8.6–10)
CHLORIDE SERPL-SCNC: 104 MMOL/L (ref 98–107)
CREAT SERPL-MCNC: 1.06 MG/DL (ref 0.67–1.17)
DEPRECATED HCO3 PLAS-SCNC: 25 MMOL/L (ref 22–29)
EGFRCR SERPLBLD CKD-EPI 2021: 81 ML/MIN/1.73M2
GLUCOSE SERPL-MCNC: 123 MG/DL (ref 70–99)
POTASSIUM SERPL-SCNC: 4.2 MMOL/L (ref 3.4–5.3)
PROT SERPL-MCNC: 7 G/DL (ref 6.4–8.3)
SODIUM SERPL-SCNC: 140 MMOL/L (ref 135–145)
TSH SERPL DL<=0.005 MIU/L-ACNC: 1.06 UIU/ML (ref 0.3–4.2)
VIT D+METAB SERPL-MCNC: 42 NG/ML (ref 20–50)

## 2024-07-29 ENCOUNTER — TELEPHONE (OUTPATIENT)
Dept: FAMILY MEDICINE | Facility: CLINIC | Age: 59
End: 2024-07-29
Payer: COMMERCIAL

## 2024-07-29 NOTE — TELEPHONE ENCOUNTER
S: Medication refills    B: Patient called stating that [facility] will not refill his prescriptions and is wondering why.     A: After chart review four refills we sent to [facility] in [place] on 06/25/2024, not [facility]. Patient verbalized understanding.    R: Informed patient that they may have restocked the medications because they were not picked up in a timely manor, therefore, there may be a wait for them to refill. Patient verbalized understanding and no further questions at this time.    Hansa Jeffers RN on 7/29/2024 at 3:08 PM

## 2024-07-30 ENCOUNTER — TRANSFERRED RECORDS (OUTPATIENT)
Dept: HEALTH INFORMATION MANAGEMENT | Facility: CLINIC | Age: 59
End: 2024-07-30
Payer: COMMERCIAL

## 2024-11-25 DIAGNOSIS — R39.14 BENIGN PROSTATIC HYPERPLASIA WITH INCOMPLETE BLADDER EMPTYING: ICD-10-CM

## 2024-11-25 DIAGNOSIS — N40.1 BENIGN PROSTATIC HYPERPLASIA WITH INCOMPLETE BLADDER EMPTYING: ICD-10-CM

## 2024-11-25 RX ORDER — TAMSULOSIN HYDROCHLORIDE 0.4 MG/1
CAPSULE ORAL
Qty: 90 CAPSULE | Refills: 0 | Status: SHIPPED | OUTPATIENT
Start: 2024-11-25

## 2024-11-25 NOTE — TELEPHONE ENCOUNTER
Prescription approved per [facility] Refill Protocol.  Angelika Quiñonez RN  [facility]

## 2024-12-21 NOTE — TELEPHONE ENCOUNTER
Ludmila Coto stated she could see him if he came in the next 10 minutes or so but he stated he could not as he was in [place] working. Pt stated he will continue taking ibuprofen and will call if symptoms are worsening. Will keep appt as scheduled.    Gabby Najera CMA ([facility])     Subjective   History of Present Illness  68-year-old female with is taking Macrodantin for urinary tract infection the patient reports that this afternoon she had the onset of itching all over and had the onset of swelling in her throat she states that she went to urgent care center where she was given Solu-Medrol 125 mg IM, Benadryl 25 mg IM, and epinephrine 0.3 cc subcutaneously.  Patient was also given p.o. Pepcid.  She was then transferred by EMS to this facility.  The patient reports no history of previous reaction other than reactions to penicillin and sulfa from childhood.  She states that she has had no difficulty swallowing comfortable until she got the epinephrine.  The patient had truncal urticaria.  No mucosal membrane involvement  Patient also has had azo today  Review of Systems   Constitutional:  Positive for fatigue. Negative for chills and fever.   HENT:  Positive for trouble swallowing. Negative for facial swelling.    Genitourinary:  Positive for difficulty urinating, dysuria and frequency.   Skin:  Positive for rash.   Neurological:  Positive for light-headedness.       No past medical history on file.    Allergies   Allergen Reactions    Nitrofurantoin Macrocrystal Anaphylaxis and Itching    Penicillins Hives    Sulfa Antibiotics Hives       Past Surgical History:   Procedure Laterality Date    CHOLECYSTECTOMY      EYE SURGERY      KNEE SURGERY      OVARIAN CYST REMOVAL         No family history on file.    Social History     Socioeconomic History    Marital status:    Tobacco Use    Smoking status: Never    Smokeless tobacco: Never   Vaping Use    Vaping status: Never Used   Substance and Sexual Activity    Alcohol use: Yes    Drug use: Never    Sexual activity: Defer       No other known changes in environment    Objective   Physical Exam  Alert Steff Coma Scale 15   HEENT: Pupils equal and reactive to light. Conjunctivae are not injected. Normal tympanic membranes. Oropharynx and  nares are normal.  No overt intraoral edema is identified   Neck: Supple. Midline trachea. No JVD. No goiter.   Chest: Clear and equal breath sounds bilaterally, regular rate and rhythm without murmur or rub.   Abdomen: Positive bowel sounds mild suprapubic discomfort is noted, nondistended. No rebound or peritoneal signs. No CVA tenderness.   Extremities no clubbing. cyanosis or edema. Motor sensory exam is normal. The full range of motion is intact   Skin: Warm and dry, no rashes or petechia.   Lymphatic: No regional lymphadenopathy. No calf pain, swelling or Homans sign    Procedures           ED Course      Labs Reviewed   COMPREHENSIVE METABOLIC PANEL - Abnormal; Notable for the following components:       Result Value    Glucose 158 (*)     BUN 24 (*)     Creatinine 1.41 (*)     Potassium 3.4 (*)     eGFR 40.7 (*)     All other components within normal limits    Narrative:     GFR Categories in Chronic Kidney Disease (CKD)      GFR Category          GFR (mL/min/1.73)    Interpretation  G1                     90 or greater         Normal or high (1)  G2                      60-89                Mild decrease (1)  G3a                   45-59                Mild to moderate decrease  G3b                   30-44                Moderate to severe decrease  G4                    15-29                Severe decrease  G5                    14 or less           Kidney failure          (1)In the absence of evidence of kidney disease, neither GFR category G1 or G2 fulfill the criteria for CKD.    eGFR calculation 2021 CKD-EPI creatinine equation, which does not include race as a factor   URINALYSIS W/ CULTURE IF INDICATED - Abnormal; Notable for the following components:    Color, UA Orange (*)     Appearance, UA Cloudy (*)     Protein, UA Trace (*)     Leuk Esterase, UA Moderate (2+) (*)     Nitrite, UA Positive (*)     All other components within normal limits    Narrative:     In absence of clinical symptoms, the  presence of pyuria, bacteria, and/or nitrites on the urinalysis result does not correlate with infection.   CBC WITH AUTO DIFFERENTIAL - Abnormal; Notable for the following components:    WBC 15.54 (*)     Hemoglobin 11.8 (*)     MCHC 30.6 (*)     Neutrophil % 81.7 (*)     Lymphocyte % 15.7 (*)     Monocyte % 1.2 (*)     Neutrophils, Absolute 12.70 (*)     All other components within normal limits   URINALYSIS, MICROSCOPIC ONLY - Abnormal; Notable for the following components:    RBC, UA 6-10 (*)     WBC, UA 21-50 (*)     Bacteria, UA 1+ (*)     Squamous Epithelial Cells, UA 3-6 (*)     All other components within normal limits   POCT CHEM 8 - Abnormal; Notable for the following components:    Glucose 143 (*)     Creatinine 1.50 (*)     POC Potassium 3.4 (*)     Hemoglobin 11.2 (*)     Hematocrit 33 (*)     eGFR 37.8 (*)     All other components within normal limits   URINE CULTURE   CBC AND DIFFERENTIAL    Narrative:     The following orders were created for panel order CBC & Differential.  Procedure                               Abnormality         Status                     ---------                               -----------         ------                     CBC Auto Differential[[id number]]        Abnormal            Final result                 Please view results for these tests on the individual orders.     .EDS  No radiology results for the last day                                                   Medical Decision Making  Stable ER course.  The patient given additional Benadryl IV.  The patient was also given ceftriaxone.  She advised avoid Macrodantin in the future and was placed on cefdinir instead.  The patient was stable at discharge and vocalized understanding of discharge instructions and warning urine culture is pending    Amount and/or Complexity of Data Reviewed  Independent Historian: EMS     Details: Limited data from urgent care center  Family  Labs: ordered. Decision-making details documented  in ED Course.    Risk  OTC drugs.  Prescription drug management.        Final diagnoses:   Adverse reaction to nitrofurantoin, initial encounter   Allergic reaction, initial encounter   Urinary tract infection, acute       ED Disposition  ED Disposition       ED Disposition   Discharge    Condition   Stable    Comment   --               Brittany Frazier MD  [address] [facility] [place] [address] [phone]               Medication List        New Prescriptions      cefdinir 300 MG capsule  Commonly known as: OMNICEF  Take 1 capsule by mouth 2 (Two) Times a Day.     EPINEPHrine 0.3 MG/0.3ML solution auto-injector injection  Commonly known as: EPIPEN  Inject 0.3 mL under the skin into the appropriate area as directed 1 (One) Time for 1 dose.               Where to Get Your Medications        These medications were sent to [facility] [id number] - [place], [place] - [address] - [phone]  - [phone] FX  [address] [place] [address]      Phone: [phone]   cefdinir 300 MG capsule  EPINEPHrine 0.3 MG/0.3ML solution auto-injector injection            Barrett Rankin MD  12/21/24 [id number]

## 2025-02-06 DIAGNOSIS — F41.9 ANXIETY: ICD-10-CM

## 2025-02-06 RX ORDER — ESCITALOPRAM OXALATE 20 MG/1
20 TABLET ORAL DAILY
Qty: 90 TABLET | Refills: 0 | Status: SHIPPED | OUTPATIENT
Start: 2025-02-06

## 2025-02-22 DIAGNOSIS — N40.1 BENIGN PROSTATIC HYPERPLASIA WITH INCOMPLETE BLADDER EMPTYING: ICD-10-CM

## 2025-02-22 DIAGNOSIS — R39.14 BENIGN PROSTATIC HYPERPLASIA WITH INCOMPLETE BLADDER EMPTYING: ICD-10-CM

## 2025-02-24 RX ORDER — TAMSULOSIN HYDROCHLORIDE 0.4 MG/1
CAPSULE ORAL
Qty: 90 CAPSULE | Refills: 0 | Status: SHIPPED | OUTPATIENT
Start: 2025-02-24

## 2025-02-25 DIAGNOSIS — F41.9 ANXIETY: ICD-10-CM

## 2025-02-25 RX ORDER — BUPROPION HYDROCHLORIDE 150 MG/1
150 TABLET ORAL EVERY MORNING
Qty: 90 TABLET | Refills: 0 | Status: SHIPPED | OUTPATIENT
Start: 2025-02-25

## 2025-03-05 ENCOUNTER — PATIENT OUTREACH (OUTPATIENT)
Dept: CARE COORDINATION | Facility: CLINIC | Age: 60
End: 2025-03-05
Payer: COMMERCIAL

## 2025-03-19 ENCOUNTER — TRANSFERRED RECORDS (OUTPATIENT)
Dept: HEALTH INFORMATION MANAGEMENT | Facility: CLINIC | Age: 60
End: 2025-03-19

## 2025-03-19 ENCOUNTER — PATIENT OUTREACH (OUTPATIENT)
Dept: CARE COORDINATION | Facility: CLINIC | Age: 60
End: 2025-03-19

## 2025-03-19 ENCOUNTER — E-VISIT (OUTPATIENT)
Dept: FAMILY MEDICINE | Facility: CLINIC | Age: 60
End: 2025-03-19
Payer: COMMERCIAL

## 2025-03-19 DIAGNOSIS — S89.91XD RIGHT KNEE INJURY, SUBSEQUENT ENCOUNTER: Primary | ICD-10-CM

## 2025-03-26 ENCOUNTER — OFFICE VISIT (OUTPATIENT)
Dept: FAMILY MEDICINE | Facility: CLINIC | Age: 60
End: 2025-03-26
Payer: COMMERCIAL

## 2025-03-26 VITALS
SYSTOLIC BLOOD PRESSURE: 126 MMHG | TEMPERATURE: 98.1 F | RESPIRATION RATE: 16 BRPM | BODY MASS INDEX: 29.65 KG/M2 | OXYGEN SATURATION: 98 % | HEIGHT: 71 IN | DIASTOLIC BLOOD PRESSURE: 78 MMHG | HEART RATE: 63 BPM | WEIGHT: 211.8 LBS

## 2025-03-26 DIAGNOSIS — Z13.1 SCREENING FOR DIABETES MELLITUS: ICD-10-CM

## 2025-03-26 DIAGNOSIS — Z00.00 ROUTINE GENERAL MEDICAL EXAMINATION AT A HEALTH CARE FACILITY: Primary | ICD-10-CM

## 2025-03-26 DIAGNOSIS — R39.14 BENIGN PROSTATIC HYPERPLASIA WITH INCOMPLETE BLADDER EMPTYING: ICD-10-CM

## 2025-03-26 DIAGNOSIS — M10.9 GOUT OF RIGHT KNEE, UNSPECIFIED CAUSE, UNSPECIFIED CHRONICITY: ICD-10-CM

## 2025-03-26 DIAGNOSIS — I63.9 CEREBROVASCULAR ACCIDENT (CVA), UNSPECIFIED MECHANISM (H): ICD-10-CM

## 2025-03-26 DIAGNOSIS — E78.5 HYPERLIPIDEMIA LDL GOAL <130: ICD-10-CM

## 2025-03-26 DIAGNOSIS — F41.9 ANXIETY: ICD-10-CM

## 2025-03-26 DIAGNOSIS — N40.1 BENIGN PROSTATIC HYPERPLASIA WITH INCOMPLETE BLADDER EMPTYING: ICD-10-CM

## 2025-03-26 PROCEDURE — 99214 OFFICE O/P EST MOD 30 MIN: CPT | Mod: 25 | Performed by: PHYSICIAN ASSISTANT

## 2025-03-26 PROCEDURE — 99396 PREV VISIT EST AGE 40-64: CPT | Mod: 25 | Performed by: PHYSICIAN ASSISTANT

## 2025-03-26 PROCEDURE — 1126F AMNT PAIN NOTED NONE PRSNT: CPT | Performed by: PHYSICIAN ASSISTANT

## 2025-03-26 PROCEDURE — 3078F DIAST BP <80 MM HG: CPT | Performed by: PHYSICIAN ASSISTANT

## 2025-03-26 PROCEDURE — 96127 BRIEF EMOTIONAL/BEHAV ASSMT: CPT | Performed by: PHYSICIAN ASSISTANT

## 2025-03-26 PROCEDURE — 3074F SYST BP LT 130 MM HG: CPT | Performed by: PHYSICIAN ASSISTANT

## 2025-03-26 RX ORDER — TAMSULOSIN HYDROCHLORIDE 0.4 MG/1
CAPSULE ORAL
Qty: 90 CAPSULE | Refills: 3 | Status: SHIPPED | OUTPATIENT
Start: 2025-03-26

## 2025-03-26 RX ORDER — ESCITALOPRAM OXALATE 20 MG/1
20 TABLET ORAL DAILY
Qty: 90 TABLET | Refills: 3 | Status: SHIPPED | OUTPATIENT
Start: 2025-03-26

## 2025-03-26 RX ORDER — BUPROPION HYDROCHLORIDE 150 MG/1
150 TABLET ORAL EVERY MORNING
Qty: 90 TABLET | Refills: 3 | Status: SHIPPED | OUTPATIENT
Start: 2025-03-26

## 2025-03-26 SDOH — HEALTH STABILITY: PHYSICAL HEALTH: ON AVERAGE, HOW MANY DAYS PER WEEK DO YOU ENGAGE IN MODERATE TO STRENUOUS EXERCISE (LIKE A BRISK WALK)?: 7 DAYS

## 2025-03-26 SDOH — HEALTH STABILITY: PHYSICAL HEALTH: ON AVERAGE, HOW MANY MINUTES DO YOU ENGAGE IN EXERCISE AT THIS LEVEL?: 10 MIN

## 2025-03-26 ASSESSMENT — ANXIETY QUESTIONNAIRES
2. NOT BEING ABLE TO STOP OR CONTROL WORRYING: NOT AT ALL
5. BEING SO RESTLESS THAT IT IS HARD TO SIT STILL: NOT AT ALL
7. FEELING AFRAID AS IF SOMETHING AWFUL MIGHT HAPPEN: NOT AT ALL
GAD7 TOTAL SCORE: 0
IF YOU CHECKED OFF ANY PROBLEMS ON THIS QUESTIONNAIRE, HOW DIFFICULT HAVE THESE PROBLEMS MADE IT FOR YOU TO DO YOUR WORK, TAKE CARE OF THINGS AT HOME, OR GET ALONG WITH OTHER PEOPLE: NOT DIFFICULT AT ALL
3. WORRYING TOO MUCH ABOUT DIFFERENT THINGS: NOT AT ALL
1. FEELING NERVOUS, ANXIOUS, OR ON EDGE: NOT AT ALL
6. BECOMING EASILY ANNOYED OR IRRITABLE: NOT AT ALL
GAD7 TOTAL SCORE: 0

## 2025-03-26 ASSESSMENT — PAIN SCALES - GENERAL: PAINLEVEL_OUTOF10: NO PAIN (0)

## 2025-03-26 ASSESSMENT — PATIENT HEALTH QUESTIONNAIRE - PHQ9: 5. POOR APPETITE OR OVEREATING: NOT AT ALL

## 2025-03-26 ASSESSMENT — SOCIAL DETERMINANTS OF HEALTH (SDOH): HOW OFTEN DO YOU GET TOGETHER WITH FRIENDS OR RELATIVES?: ONCE A WEEK

## 2025-03-26 NOTE — PROGRESS NOTES
"Preventive Care Visit  [facility] ELODIA Gomez PA-C, Family Medicine  Mar 26, 2025      Assessment & Plan     Routine general medical examination at a health care facility  Recommend routine annual visits.  Cancer screening will be updated as below. Colon due in 2028     Hyperlipidemia-  considering simvastatin or atorvastatin vs ct scan coronary arteries he is off rosuvastatin .  Treatment with a statin is highly recommended due to his history of CVA.  For now, he wants to see what his lipids are      Anxiety  Stable recheck in 1 year  - buPROPion (WELLBUTRIN XL) 150 MG 24 hr tablet; Take 1 tablet (150 mg) by mouth every morning.  - escitalopram (LEXAPRO) 20 MG tablet; Take 1 tablet (20 mg) by mouth daily.    Benign prostatic hyperplasia with incomplete bladder emptying  Doing well, recheck 1 year to see urology if psa is high  - tamsulosin (FLOMAX) 0.4 MG capsule; TAKE ONE CAPSULE BY MOUTH ONCE DAILY    Screening for diabetes mellitus    - Glucose; Future    Gout of right knee, unspecified cause, unspecified chronicity  Will check uric acid level when he is not in an acute flair, consider allopurinol   - Uric acid; Future    Patient has been advised of split billing requirements and indicates understanding: Yes        BMI  Estimated body mass index is 29.52 kg/m  as calculated from the following:    Height as of this encounter: 1.804 m (5' 11.02\").    Weight as of this encounter: 96.1 kg (211 lb 12.8 oz).   Weight management plan: Discussed healthy diet and exercise guidelines    Counseling  Appropriate preventive services were addressed with this patient via screening, questionnaire, or discussion as appropriate for fall prevention, nutrition, physical activity, Tobacco-use cessation, social engagement, weight loss and cognition.  Checklist reviewing preventive services available has been given to the patient.  Reviewed patient's diet, addressing concerns and/or questions.       This " chart documentation was completed in part with Dragon voice recognition software.  Documentation is reviewed after completion, however, some words and grammatical errors may remain.  KEAGAN Walker   Rebel is a 59 year old, presenting for the following:  Physical        3/26/2025     1:20 PM   Additional Questions   Roomed by YK   Accompanied by self          HPI     He did see ortho last week.  He had gout, they gave him a steroid injection, and indomethacin.  They are not sure why he had a gouty flair his knee feels so much better  Anxiety   How are you doing with your anxiety since your last visit? Improved   Are you having other symptoms that might be associated with anxiety? No  Have you had a significant life event? No   Are you feeling depressed? No  Do you have any concerns with your use of alcohol or other drugs? No  he has been sober for 1 year and is doing very well.  He is very pleased.    Social History     Tobacco Use    Smoking status: Former     Current packs/day: 0.00     Types: Cigarettes     Quit date: 1988     Years since quittin.1     Passive exposure: Current    Smokeless tobacco: Current     Last attempt to quit: 2023   Vaping Use    Vaping status: Some Days    Substances: Nicotine    Devices: Disposable   Substance Use Topics    Alcohol use: Not Currently     Comment: occ    Drug use: No         11/10/2023     8:24 AM 2024    11:11 AM 3/26/2025     1:25 PM   LUIS-7 SCORE   Total Score 0 (minimal anxiety) 0 (minimal anxiety)    Total Score 0 0 0         2021     6:59 AM 2021     1:05 PM 2024    11:11 AM   PHQ   PHQ-9 Total Score 4 1 1   Q9: Thoughts of better off dead/self-harm past 2 weeks Not at all  Not at all Not at all       Proxy-reported         3/26/2025     1:25 PM   LUIS-7    1. Feeling nervous, anxious, or on edge 0   2. Not being able to stop or control worrying 0   3. Worrying too much about different things 0   4. Trouble  relaxing 0   5. Being so restless that it is hard to sit still 0   6. Becoming easily annoyed or irritable 0   7. Feeling afraid, as if something awful might happen 0   LUIS-7 Total Score 0   If you checked any problems, how difficult have they made it for you to do your work, take care of things at home, or get along with other people? Not difficult at all     Hyperlipidemia Follow-Up    Are you regularly taking any medication or supplement to lower your cholesterol?   No  he stopped crestor about 3 months ago, he had so much body pain.  This has resolved since going off meds.   Are you having muscle aches or other side effects that you think could be caused by your cholesterol lowering medication?  Yes- now resolved off crestor  he does have a significant family history of CAD  The ASCVD Risk score (Altaf FAYE, et al., 2019) failed to calculate for the following reasons:    Risk score cannot be calculated because patient has a medical history suggesting prior/existing ASCVD     Advance Care Planning  Patient does not have a Health Care Directive:       3/26/2025   General Health   How would you rate your overall physical health? Excellent   Feel stress (tense, anxious, or unable to sleep) Not at all         3/26/2025   Nutrition   Three or more servings of calcium each day? Yes   Diet: Low fat/cholesterol   How many servings of fruit and vegetables per day? (!) 2-3, goal is 5    How many sweetened beverages each day? 0-1         3/26/2025   Exercise   Days per week of moderate/strenous exercise 7 days, goal is 150 min   Average minutes spent exercising at this level 10 min         3/26/2025   Social Factors   Frequency of gathering with friends or relatives Once a week   Worry food won't last until get money to buy more No   Food not last or not have enough money for food? No   Do you have housing? (Housing is defined as stable permanent housing and does not include staying ouside in a car, in a tent, in an  abandoned building, in an overnight shelter, or couch-surfing.) Yes   Are you worried about losing your housing? No   Lack of transportation? No   Unable to get utilities (heat,electricity)? No         3/26/2025   Fall Risk   Fallen 2 or more times in the past year? No   Trouble with walking or balance? No          3/26/2025   Dental   Dentist two times every year? Yes           Today's PHQ-2 Score:       3/26/2025     1:09 PM   PHQ-2 (  [employer])   Q1: Little interest or pleasure in doing things 0   Q2: Feeling down, depressed or hopeless 0   PHQ-2 Score 0    Q1: Little interest or pleasure in doing things Not at all   Q2: Feeling down, depressed or hopeless Not at all   PHQ-2 Score 0       Patient-reported           3/26/2025   Substance Use   Alcohol more than 3/day or more than 7/wk Not Applicable   Do you use any other substances recreationally? No     Social History     Tobacco Use    Smoking status: Former     Current packs/day: 0.00     Types: Cigarettes     Quit date: 1988     Years since quittin.1     Passive exposure: Current    Smokeless tobacco: Current     Last attempt to quit: 2023   Vaping Use    Vaping status: Some Days    Substances: Nicotine    Devices: Disposable   Substance Use Topics    Alcohol use: Not Currently     Comment: occ    Drug use: No           3/26/2025   STI Screening   New sexual partner(s) since last STI/HIV test? No   Last PSA:   PSA   Date Value Ref Range Status   2020 0.87 0 - 4 ug/L Final     Comment:     Assay Method:  Chemiluminescence using Siemens Vista analyzer     Prostate Specific Antigen Screen   Date Value Ref Range Status   2023 0.61 0.00 - 3.50 ng/mL Final   2022 2.83 0.00 - 4.00 ug/L Final     ASCVD Risk   The ASCVD Risk score (Altaf FAYE, et al., 2019) failed to calculate for the following reasons:    Risk score cannot be calculated because patient has a medical history suggesting prior/existing ASCVD           Reviewed  and updated as needed this visit by Provider                    Lab work is in process  Labs reviewed in EPIC  BP Readings from Last 3 Encounters:   25 126/78   24 134/74   24 130/70    Wt Readings from Last 3 Encounters:   25 96.1 kg (211 lb 12.8 oz)   24 97.5 kg (215 lb)   24 95.3 kg (210 lb)                  Patient Active Problem List   Diagnosis    Pain in joint, lower leg    Encounter for other general counseling or advice on contraception    Encounter for sterilization    Anxiety    Family history of colon cancer    Family history of prostate cancer    Attention deficit hyperactivity disorder (ADHD)    Hyperlipidemia LDL goal <130    Gastroesophageal reflux disease without esophagitis    Gastric ulcer without hemorrhage or perforation, unspecified chronicity    GLENROY (obstructive sleep apnea)    Alcohol use disorder in remission     Past Surgical History:   Procedure Laterality Date    COLONOSCOPY  2000    COLONOSCOPY  02/16/10    COLONOSCOPY N/A 2015    Procedure: COMBINED COLONOSCOPY, SINGLE OR MULTIPLE BIOPSY/POLYPECTOMY BY BIOPSY;  Surgeon: Dima Sosa MD;  Location:  GI    COLONOSCOPY N/A 2023    Procedure: COLONOSCOPY, WITH POLYPECTOMY AND BIOPSY;  Surgeon: Delroy Myers MD;  Location:  GI    HC CORRECT BUNION,KIM/DARNELL/WALSH  2005    Modified Darnell bunionectomy, right foot.    HERNIA REPAIR, INCISIONAL  2006    Incarcerated recurrent ventral hernia.    HERNIORRHAPHY UMBILICAL  3/29/2011    HERNIORRHAPHY UMBILICAL performed by ALOK WESLEY at  OR    [facility] REPAIR UMBILICAL HARDY,5+Y/O, INCARCERATED OR STRANGULATED  6/15/2004       Social History     Tobacco Use    Smoking status: Former     Current packs/day: 0.00     Types: Cigarettes     Quit date: 1988     Years since quittin.1     Passive exposure: Current    Smokeless tobacco: Current     Last attempt to quit: 2023   Substance Use Topics     "Alcohol use: Not Currently     Comment: occ     Family History   Problem Relation Age of Onset    Allergies Father         iodine    Heart Disease Father         heart attack at 59 - CABG at 60    Cerebrovascular Disease Father             Cancer Mother         colon -  at age 56    Cancer Sister         cervix -  at age 38    Neurologic Disorder Sister         CNS bleed x 2 sisters    Prostate Cancer Brother 58    Other Cancer Brother 62            Colon Cancer Sister 68                 Current Outpatient Medications   Medication Sig Dispense Refill    ASPIRIN LOW DOSE 81 MG EC tablet       buPROPion (WELLBUTRIN XL) 150 MG 24 hr tablet Take 1 tablet (150 mg) by mouth every morning. 90 tablet 3    escitalopram (LEXAPRO) 20 MG tablet Take 1 tablet (20 mg) by mouth daily. 90 tablet 3    tamsulosin (FLOMAX) 0.4 MG capsule TAKE ONE CAPSULE BY MOUTH ONCE DAILY 90 capsule 3     Allergies   Allergen Reactions    No Known Allergies          Review of Systems  Constitutional, HEENT, cardiovascular, pulmonary, gi and gu systems are negative, except as otherwise noted.     Objective    Exam  /78   Pulse 63   Temp 98.1  F (36.7  C) (Temporal)   Resp 16   Ht 1.804 m (5' 11.02\")   Wt 96.1 kg (211 lb 12.8 oz)   SpO2 98%   BMI 29.52 kg/m     Estimated body mass index is 29.52 kg/m  as calculated from the following:    Height as of this encounter: 1.804 m (5' 11.02\").    Weight as of this encounter: 96.1 kg (211 lb 12.8 oz).    Physical Exam  GENERAL: alert and no distress  EYES: Eyes grossly normal to inspection, PERRL and conjunctivae and sclerae normal  HENT: ear canals and TM's normal, nose and mouth without ulcers or lesions  NECK: no adenopathy, no asymmetry, masses, or scars  RESP: lungs clear to auscultation - no rales, rhonchi or wheezes  CV: regular rate and rhythm, normal S1 S2, no S3 or S4, no murmur, click or rub, no peripheral edema  ABDOMEN: soft, nontender, no " hepatosplenomegaly, no masses and bowel sounds normal  MS: no gross musculoskeletal defects noted, no edema  SKIN: no suspicious lesions or rashes  NEURO: Normal strength and tone, mentation intact and speech normal  PSYCH: mentation appears normal, affect normal/bright        Signed Electronically by: Marilu Gomez PA-C

## 2025-03-26 NOTE — PATIENT INSTRUCTIONS
Patient Education   Preventive Care Advice   This is general advice given by our system to help you stay healthy. However, your care team may have specific advice just for you. Please talk to your care team about your preventive care needs.  Nutrition  Eat 5 or more servings of fruits and vegetables each day.  Try wheat bread, brown rice and whole grain pasta (instead of white bread, rice, and pasta).  Get enough calcium and vitamin D. Check the label on foods and aim for 100% of the RDA (recommended daily allowance).  Lifestyle  Exercise at least 150 minutes each week  (30 minutes a day, 5 days a week).  Do muscle strengthening activities 2 days a week. These help control your weight and prevent disease.  No smoking.  Wear sunscreen to prevent skin cancer.  Have a dental exam and cleaning every 6 months.  Yearly exams  See your health care team every year to talk about:  Any changes in your health.  Any medicines your care team has prescribed.  Preventive care, family planning, and ways to prevent chronic diseases.  Shots (vaccines)   HPV shots (up to age 26), if you've never had them before.  Hepatitis B shots (up to age 59), if you've never had them before.  COVID-19 shot: Get this shot when it's due.  Flu shot: Get a flu shot every year.  Tetanus shot: Get a tetanus shot every 10 years.  Pneumococcal, hepatitis A, and RSV shots: Ask your care team if you need these based on your risk.  Shingles shot (for age 50 and up)  General health tests  Diabetes screening:  Starting at age 35, Get screened for diabetes at least every 3 years.  If you are younger than age 35, ask your care team if you should be screened for diabetes.  Cholesterol test: At age 39, start having a cholesterol test every 5 years, or more often if advised.  Bone density scan (DEXA): At age 50, ask your care team if you should have this scan for osteoporosis (brittle bones).  Hepatitis C: Get tested at least once in your life.  STIs (sexually  transmitted infections)  Before age 24: Ask your care team if you should be screened for STIs.  After age 24: Get screened for STIs if you're at risk. You are at risk for STIs (including HIV) if:  You are sexually active with more than one person.  You don't use condoms every time.  You or a partner was diagnosed with a sexually transmitted infection.  If you are at risk for HIV, ask about PrEP medicine to prevent HIV.  Get tested for HIV at least once in your life, whether you are at risk for HIV or not.  Cancer screening tests  Cervical cancer screening: If you have a cervix, begin getting regular cervical cancer screening tests starting at age 21.  Breast cancer scan (mammogram): If you've ever had breasts, begin having regular mammograms starting at age 40. This is a scan to check for breast cancer.  Colon cancer screening: It is important to start screening for colon cancer at age 45.  Have a colonoscopy test every 10 years (or more often if you're at risk) Or, ask your provider about stool tests like a FIT test every year or Cologuard test every 3 years.  To learn more about your testing options, visit:   .  For help making a decision, visit:   https://bit.ly/ur92400.  Prostate cancer screening test: If you have a prostate, ask your care team if a prostate cancer screening test (PSA) at age 55 is right for you.  Lung cancer screening: If you are a current or former smoker ages 50 to 80, ask your care team if ongoing lung cancer screenings are right for you.  For informational purposes only. Not to replace the advice of your health care provider. Copyright   2023 [employer]. All rights reserved. Clinically reviewed by the [facility] Transitions Program. [employer] [id number] - REV 01/24.

## 2025-07-03 ENCOUNTER — OFFICE VISIT (OUTPATIENT)
Dept: FAMILY MEDICINE | Facility: CLINIC | Age: 60
End: 2025-07-03
Payer: COMMERCIAL

## 2025-07-03 VITALS
BODY MASS INDEX: 29.67 KG/M2 | OXYGEN SATURATION: 95 % | HEART RATE: 70 BPM | WEIGHT: 212.9 LBS | SYSTOLIC BLOOD PRESSURE: 124 MMHG | DIASTOLIC BLOOD PRESSURE: 82 MMHG | RESPIRATION RATE: 15 BRPM | TEMPERATURE: 98.1 F

## 2025-07-03 DIAGNOSIS — M25.561 CHRONIC PAIN OF RIGHT KNEE: Primary | ICD-10-CM

## 2025-07-03 DIAGNOSIS — G89.29 CHRONIC PAIN OF RIGHT KNEE: Primary | ICD-10-CM

## 2025-07-03 ASSESSMENT — PAIN SCALES - GENERAL: PAINLEVEL_OUTOF10: MODERATE PAIN (6)

## 2025-07-03 ASSESSMENT — PATIENT HEALTH QUESTIONNAIRE - PHQ9: SUM OF ALL RESPONSES TO PHQ QUESTIONS 1-9: 4

## 2025-07-03 NOTE — PROGRESS NOTES
Assessment & Plan     Chronic pain of right knee  He has had pain following a fall on to the knee in 2023 which was managed through [employer] as work comp until recently.   Steroid injections have been helpful and benefit lasts a few months. He daniels snot have insurance coverage to cont through [employer].   Pain is of his typical pattern and no new injury. Ortho notes- due to progression of arthritis. Exam does not seem consistent with gout today- no redness, warmth, swelling. Start acetaminophen on schedule. Topical voltaren is  using once daily- can increase frequency. Ref to PT. If not seeing benefit then ref to sports med with [facility] for steroid injection.     - Physical Therapy  Referral; Future  - Orthopedic  Referral; Future        Follow Up: see above. Additionally patient was instructed to contact clinic for worsening symptoms, non-improvement in time frame discussed, and for questions regarding treatment plan.   For virtual visits, the patient was advised to be seen for in person evaluation if symptoms or condition are worsening or non-improvement as expected.   KEAGAN Valencia   Rebel is a 59 year old, presenting for the following health issues:  Musculoskeletal Problem        7/3/2025    12:38 PM   Additional Questions   Roomed by Chiara CANTU   Accompanied by None         7/3/2025    12:38 PM   Patient Reported Additional Medications   Patient reports taking the following new medications NA     Musculoskeletal Problem    History of Present Illness       Reason for visit:  Knee pain  Symptom onset:  More than a month  Symptoms include:  Swollen knee  Symptom intensity:  Severe  Symptom progression:  Worsening  Had these symptoms before:  Yes  Has tried/received treatment for these symptoms:  Yes  Previous treatment was successful:  Yes  Prior treatment description:  Cortisone   He is taking medications regularly.        RIGHT knee  Was a work comp injury Sept 2023  - fell  "onto knee cap. Care through [facility]. They did surgery to remove loose body and repaired a meniscus Nov 2023. Has had pain in the knee since. Had a steroid injection July 2024 and March 2025.  The injection \"absolutely\" helped the pain- I felt like a new person. The injection started to wear off in May. Pain is in the back of the knee and it gets swollen. His current sx are \"the same\" as what he has been experiencing. No new injury, fall or trauma. Pain is daily.     Work comp closed the case    Last visit to [facility] 03/26/25- \"mild OA and chondrocalcinosis\". Felt to be a progression of arthritis and maybe gout. Given indomethacin (didn't help) and a steroid injection. Work comp didn't pay for it because it was OA. Orthopedic doctor told him could be knee replacement down the line.     Did PT around time of his surgery.     Topical Voltaren at bedtime. Has not tried it in the morning.   Does ice.     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 2 years ago    Have you seen this provider for your pain in the past? No   Where in your body do your have pain? Knee right    Are you seeing anyone else for your pain? Yes - [facility]   What makes your pain better? Cortisone injection, ice   What makes your pain worse? Walking   How has pain affected your ability to work? 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 \"2 other people\"           11/10/2023     8:24 AM 6/25/2024    11:11 AM 3/26/2025     1:25 PM   LUIS-7 SCORE   Total Score 0 (minimal anxiety) 0 (minimal anxiety)    Total Score 0 0 0             Review of Systems  Constitutional, HEENT, cardiovascular, pulmonary, gi and gu systems are negative, except as otherwise noted.      Objective    /82   Pulse 70   Temp 98.1  F (36.7  C) (Temporal)   Resp 15   Wt 96.6 kg (212 lb " 14.4 oz)   SpO2 95%   BMI 29.67 kg/m    Body mass index is 29.67 kg/m .  Physical Exam   GENERAL: alert and no distress  MS: Knee: RIGHT: no redness, skin changes, bruising. Symmetric. No significant effusion or swelling. Tender  anterior and posterior more so than at the joint line. Patella nontender to exam. No popliteal fullness.  ROM full extension, flexion past 90 degrees. Pain with flexion. No laxity with ligament testing. Meniscal testing - mild uncomfortable , no locking/catching. No calf pain or tenderness. Posterior tibial pulses normal, no edema, sensation intact. Gait mildly antalgic.             Signed Electronically by: Radha Kendall PA-C     19-Apr-2019 09:14 19-Apr-2019 09:33

## 2025-07-03 NOTE — PATIENT INSTRUCTIONS
Instructions from Today's Visit:  Acetaminophen 1000mg every 8 hours.     Topical Voltaren- can use more often- can try twice daily vs just at bedtime    Ref to PT     Ref sports medicine -  comes to [place]      General Instructions After Your Visit  If you have been seen for a concern and are worsening or not improving as expected, please schedule a follow-up visit or reach out to a member of our care team or nurses if it is urgent.  We have Nurse advice available 24/7 by calling [phone].  For emergencies, please call [phone].    My Clinic Hours  I am in the office Mondays, Wednesdays, and Thursdays. Messages received outside of normal clinic hours and on my days out of the office will be reviewed by our [place] care team, but may not be addressed by me personally until I am back in the office. If you have concerns that cannot wait for my return please call the Nurse advise line [phone].    Test results  You may see your lab or test results before we can make recommendations. This is common, as sometimes we are awaiting on other labs to return or we are out of office on a particular day. Please be patient, and if you don't see a response from me or one of my colleagues within 2-3 business days, and you have a specific concern, please send us a message.    Refills  If you have run out of refills, please schedule a visit. This is generally an indication that you are due for a follow-up visit. All prescriptions are only valid for 1 year from the date written and need a visit annually to renew. Most mental health and chronic diseases we are treating (diabetes, high blood pressure, etc) require some type of visit every 6 months. We are now offering video visits! However, if physical exams are needed or it is a complex concern, we may ask you to be seen in person.    Physicals & Preventative Visits   These appointment slots fill up fast. Please consider scheduling these 2-3 months in advance to allow  for the appointment time that fits you best. If you have medications ordered or other issues addressed that are not preventive at these visits, please be aware there are extra costs associated with this.       No

## 2025-07-05 ENCOUNTER — PATIENT OUTREACH (OUTPATIENT)
Dept: CARE COORDINATION | Facility: CLINIC | Age: 60
End: 2025-07-05
Payer: COMMERCIAL

## 2025-07-07 ENCOUNTER — PATIENT OUTREACH (OUTPATIENT)
Dept: CARE COORDINATION | Facility: CLINIC | Age: 60
End: 2025-07-07
Payer: COMMERCIAL

## (undated) DEVICE — TUBING SUCTION 6"X3/16" N56A

## (undated) DEVICE — KIT ENDO TURNOVER/PROCEDURE CARRY-ON 101822

## (undated) DEVICE — SOL WATER IRRIG 1000ML BOTTLE 2F7114